# Patient Record
Sex: FEMALE | Race: WHITE | NOT HISPANIC OR LATINO
[De-identification: names, ages, dates, MRNs, and addresses within clinical notes are randomized per-mention and may not be internally consistent; named-entity substitution may affect disease eponyms.]

---

## 2017-02-15 ENCOUNTER — RESULT REVIEW (OUTPATIENT)
Age: 62
End: 2017-02-15

## 2017-02-28 ENCOUNTER — APPOINTMENT (OUTPATIENT)
Dept: ORTHOPEDIC SURGERY | Facility: CLINIC | Age: 62
End: 2017-02-28

## 2017-03-05 DIAGNOSIS — Z86.79 PERSONAL HISTORY OF OTHER DISEASES OF THE CIRCULATORY SYSTEM: ICD-10-CM

## 2017-03-05 DIAGNOSIS — I25.10 ATHEROSCLEROTIC HEART DISEASE OF NATIVE CORONARY ARTERY W/OUT ANGINA PECTORIS: ICD-10-CM

## 2017-03-08 ENCOUNTER — APPOINTMENT (OUTPATIENT)
Dept: PULMONOLOGY | Facility: CLINIC | Age: 62
End: 2017-03-08

## 2017-03-08 VITALS — WEIGHT: 236 LBS | BODY MASS INDEX: 43.17 KG/M2

## 2017-03-08 VITALS
DIASTOLIC BLOOD PRESSURE: 78 MMHG | RESPIRATION RATE: 16 BRPM | HEART RATE: 86 BPM | OXYGEN SATURATION: 97 % | SYSTOLIC BLOOD PRESSURE: 112 MMHG

## 2017-03-09 ENCOUNTER — APPOINTMENT (OUTPATIENT)
Dept: BARIATRICS/WEIGHT MGMT | Facility: CLINIC | Age: 62
End: 2017-03-09

## 2017-03-09 VITALS — WEIGHT: 236.8 LBS | BODY MASS INDEX: 43.31 KG/M2

## 2017-03-21 ENCOUNTER — CLINICAL ADVICE (OUTPATIENT)
Age: 62
End: 2017-03-21

## 2017-03-21 ENCOUNTER — APPOINTMENT (OUTPATIENT)
Dept: BARIATRICS/WEIGHT MGMT | Facility: CLINIC | Age: 62
End: 2017-03-21

## 2017-04-03 ENCOUNTER — APPOINTMENT (OUTPATIENT)
Dept: ORTHOPEDIC SURGERY | Facility: CLINIC | Age: 62
End: 2017-04-03

## 2017-04-04 ENCOUNTER — OUTPATIENT (OUTPATIENT)
Dept: OUTPATIENT SERVICES | Facility: HOSPITAL | Age: 62
LOS: 1 days | End: 2017-04-04
Payer: MEDICARE

## 2017-04-04 DIAGNOSIS — K43.9 VENTRAL HERNIA WITHOUT OBSTRUCTION OR GANGRENE: Chronic | ICD-10-CM

## 2017-04-04 DIAGNOSIS — G47.33 OBSTRUCTIVE SLEEP APNEA (ADULT) (PEDIATRIC): ICD-10-CM

## 2017-04-04 PROCEDURE — 95810 POLYSOM 6/> YRS 4/> PARAM: CPT | Mod: 26

## 2017-04-04 PROCEDURE — 95810 POLYSOM 6/> YRS 4/> PARAM: CPT

## 2017-04-10 ENCOUNTER — OUTPATIENT (OUTPATIENT)
Dept: OUTPATIENT SERVICES | Facility: HOSPITAL | Age: 62
LOS: 1 days | End: 2017-04-10
Payer: MEDICARE

## 2017-04-10 VITALS
HEART RATE: 80 BPM | TEMPERATURE: 99 F | DIASTOLIC BLOOD PRESSURE: 66 MMHG | RESPIRATION RATE: 16 BRPM | SYSTOLIC BLOOD PRESSURE: 128 MMHG | HEIGHT: 63 IN | WEIGHT: 240.08 LBS

## 2017-04-10 DIAGNOSIS — K43.9 VENTRAL HERNIA WITHOUT OBSTRUCTION OR GANGRENE: Chronic | ICD-10-CM

## 2017-04-10 DIAGNOSIS — Z01.818 ENCOUNTER FOR OTHER PREPROCEDURAL EXAMINATION: ICD-10-CM

## 2017-04-10 DIAGNOSIS — Z90.81 ACQUIRED ABSENCE OF SPLEEN: Chronic | ICD-10-CM

## 2017-04-10 DIAGNOSIS — I10 ESSENTIAL (PRIMARY) HYPERTENSION: ICD-10-CM

## 2017-04-10 DIAGNOSIS — Z87.09 PERSONAL HISTORY OF OTHER DISEASES OF THE RESPIRATORY SYSTEM: Chronic | ICD-10-CM

## 2017-04-10 DIAGNOSIS — N84.0 POLYP OF CORPUS UTERI: ICD-10-CM

## 2017-04-10 DIAGNOSIS — Z96.659 PRESENCE OF UNSPECIFIED ARTIFICIAL KNEE JOINT: Chronic | ICD-10-CM

## 2017-04-10 DIAGNOSIS — I48.91 UNSPECIFIED ATRIAL FIBRILLATION: ICD-10-CM

## 2017-04-10 LAB
ANION GAP SERPL CALC-SCNC: 13 MMOL/L — SIGNIFICANT CHANGE UP (ref 5–17)
ANISOCYTOSIS BLD QL: SLIGHT — SIGNIFICANT CHANGE UP
APTT BLD: 33.3 SEC — SIGNIFICANT CHANGE UP (ref 27.5–37.4)
BASOPHILS # BLD AUTO: 0 K/UL — SIGNIFICANT CHANGE UP (ref 0–0.2)
BASOPHILS NFR BLD AUTO: 1 % — SIGNIFICANT CHANGE UP (ref 0–2)
BUN SERPL-MCNC: 16 MG/DL — SIGNIFICANT CHANGE UP (ref 8–20)
BURR CELLS BLD QL SMEAR: PRESENT — SIGNIFICANT CHANGE UP
CALCIUM SERPL-MCNC: 10.1 MG/DL — SIGNIFICANT CHANGE UP (ref 8.6–10.2)
CHLORIDE SERPL-SCNC: 102 MMOL/L — SIGNIFICANT CHANGE UP (ref 98–107)
CO2 SERPL-SCNC: 26 MMOL/L — SIGNIFICANT CHANGE UP (ref 22–29)
CREAT SERPL-MCNC: 0.51 MG/DL — SIGNIFICANT CHANGE UP (ref 0.5–1.3)
EOSINOPHIL # BLD AUTO: 0.2 K/UL — SIGNIFICANT CHANGE UP (ref 0–0.5)
EOSINOPHIL NFR BLD AUTO: 0 % — SIGNIFICANT CHANGE UP (ref 0–6)
GLUCOSE SERPL-MCNC: 117 MG/DL — HIGH (ref 70–115)
HCT VFR BLD CALC: 41.4 % — SIGNIFICANT CHANGE UP (ref 37–47)
HGB BLD-MCNC: 13.5 G/DL — SIGNIFICANT CHANGE UP (ref 12–16)
HYPERCHROMIA BLD QL AUTO: SLIGHT — SIGNIFICANT CHANGE UP
INR BLD: 1.21 RATIO — HIGH (ref 0.88–1.16)
LYMPHOCYTES # BLD AUTO: 25 % — SIGNIFICANT CHANGE UP (ref 20–55)
LYMPHOCYTES # BLD AUTO: 3.2 K/UL — SIGNIFICANT CHANGE UP (ref 1–4.8)
MACROCYTES BLD QL: SLIGHT — SIGNIFICANT CHANGE UP
MCHC RBC-ENTMCNC: 28 PG — SIGNIFICANT CHANGE UP (ref 27–31)
MCHC RBC-ENTMCNC: 32.6 G/DL — SIGNIFICANT CHANGE UP (ref 32–36)
MCV RBC AUTO: 85.7 FL — SIGNIFICANT CHANGE UP (ref 81–99)
MICROCYTES BLD QL: SLIGHT — SIGNIFICANT CHANGE UP
MONOCYTES # BLD AUTO: 1.4 K/UL — HIGH (ref 0–0.8)
MONOCYTES NFR BLD AUTO: 10 % — SIGNIFICANT CHANGE UP (ref 3–10)
NEUTROPHILS # BLD AUTO: 8.6 K/UL — HIGH (ref 1.8–8)
NEUTROPHILS NFR BLD AUTO: 63 % — SIGNIFICANT CHANGE UP (ref 37–73)
PLAT MORPH BLD: NORMAL — SIGNIFICANT CHANGE UP
PLATELET # BLD AUTO: 441 K/UL — HIGH (ref 150–400)
POIKILOCYTOSIS BLD QL AUTO: SLIGHT — SIGNIFICANT CHANGE UP
POTASSIUM SERPL-MCNC: 4 MMOL/L — SIGNIFICANT CHANGE UP (ref 3.5–5.3)
POTASSIUM SERPL-SCNC: 4 MMOL/L — SIGNIFICANT CHANGE UP (ref 3.5–5.3)
PROTHROM AB SERPL-ACNC: 13.4 SEC — HIGH (ref 9.8–12.7)
RBC # BLD: 4.83 M/UL — SIGNIFICANT CHANGE UP (ref 4.4–5.2)
RBC # FLD: 15.4 % — SIGNIFICANT CHANGE UP (ref 11–15.6)
RBC BLD AUTO: ABNORMAL
SODIUM SERPL-SCNC: 141 MMOL/L — SIGNIFICANT CHANGE UP (ref 135–145)
SPHEROCYTES BLD QL SMEAR: SLIGHT — SIGNIFICANT CHANGE UP
TARGETS BLD QL SMEAR: SLIGHT — SIGNIFICANT CHANGE UP
VARIANT LYMPHS # BLD: 1 % — SIGNIFICANT CHANGE UP (ref 0–6)
WBC # BLD: 13.8 K/UL — HIGH (ref 4.8–10.8)
WBC # FLD AUTO: 13.8 K/UL — HIGH (ref 4.8–10.8)

## 2017-04-10 PROCEDURE — 86803 HEPATITIS C AB TEST: CPT

## 2017-04-10 PROCEDURE — 85730 THROMBOPLASTIN TIME PARTIAL: CPT

## 2017-04-10 PROCEDURE — 85610 PROTHROMBIN TIME: CPT

## 2017-04-10 PROCEDURE — G0463: CPT

## 2017-04-10 PROCEDURE — 80048 BASIC METABOLIC PNL TOTAL CA: CPT

## 2017-04-10 PROCEDURE — 85027 COMPLETE CBC AUTOMATED: CPT

## 2017-04-10 NOTE — H&P PST ADULT - ENDOCRINE
"2/20/17 Patient calls complaining of \"wet cough and runny nose\" for 2-3 days. Denies fever/body aches/headache/dyspnea/wheezing. No chronic lung disease.   He is concerned with possibility for passing this on to his wife. She is ill with leukemia and he is primary caregiver.   States whenever gets a cold, it always goes into his chest and needs antibiotics. Requesting antibiotics now to prevent spreading to his wife.   Discussed if viral, which is most likely at this point in his illness, antibiotics will not prevent contagiousness. Discussed general hygiene precautions to take to prevent spread of his illness. Patient agrees, but is very concerned about his wife's health and would like antibiotics and asks rn to check with Dr. Cornelius. Did talk to Dr. Cornelius and he reiterated above but did agree to give zpack. Patient informed and rx sent to pharmacy.  Lisa Condon RN    "
negative

## 2017-04-10 NOTE — H&P PST ADULT - PMH
Atrial fibrillation    Chronic bronchitis    COPD (chronic obstructive pulmonary disease)    GERD (gastroesophageal reflux disease)    Hypertension    Mitral valve prolapse    Osteoarthritis    Sleep apnea

## 2017-04-11 ENCOUNTER — APPOINTMENT (OUTPATIENT)
Dept: BARIATRICS | Facility: CLINIC | Age: 62
End: 2017-04-11

## 2017-04-11 VITALS
SYSTOLIC BLOOD PRESSURE: 110 MMHG | DIASTOLIC BLOOD PRESSURE: 90 MMHG | HEIGHT: 62 IN | BODY MASS INDEX: 43.82 KG/M2 | WEIGHT: 238.1 LBS

## 2017-04-11 DIAGNOSIS — N84.0 POLYP OF CORPUS UTERI: ICD-10-CM

## 2017-04-11 DIAGNOSIS — Z01.818 ENCOUNTER FOR OTHER PREPROCEDURAL EXAMINATION: ICD-10-CM

## 2017-04-12 LAB
HCV AB S/CO SERPL IA: 0.1 S/CO — SIGNIFICANT CHANGE UP
HCV AB SERPL-IMP: SIGNIFICANT CHANGE UP

## 2017-04-23 ENCOUNTER — RESULT REVIEW (OUTPATIENT)
Age: 62
End: 2017-04-23

## 2017-04-24 ENCOUNTER — APPOINTMENT (OUTPATIENT)
Dept: BARIATRICS/WEIGHT MGMT | Facility: CLINIC | Age: 62
End: 2017-04-24

## 2017-04-24 ENCOUNTER — OUTPATIENT (OUTPATIENT)
Dept: OUTPATIENT SERVICES | Facility: HOSPITAL | Age: 62
LOS: 1 days | End: 2017-04-24
Payer: MEDICARE

## 2017-04-24 VITALS
DIASTOLIC BLOOD PRESSURE: 81 MMHG | HEART RATE: 84 BPM | WEIGHT: 240.08 LBS | OXYGEN SATURATION: 99 % | RESPIRATION RATE: 16 BRPM | TEMPERATURE: 97 F | HEIGHT: 63 IN | SYSTOLIC BLOOD PRESSURE: 148 MMHG

## 2017-04-24 VITALS
HEART RATE: 94 BPM | SYSTOLIC BLOOD PRESSURE: 136 MMHG | RESPIRATION RATE: 12 BRPM | DIASTOLIC BLOOD PRESSURE: 84 MMHG | TEMPERATURE: 97 F | OXYGEN SATURATION: 97 %

## 2017-04-24 DIAGNOSIS — Z96.659 PRESENCE OF UNSPECIFIED ARTIFICIAL KNEE JOINT: Chronic | ICD-10-CM

## 2017-04-24 DIAGNOSIS — K43.9 VENTRAL HERNIA WITHOUT OBSTRUCTION OR GANGRENE: Chronic | ICD-10-CM

## 2017-04-24 DIAGNOSIS — Z87.09 PERSONAL HISTORY OF OTHER DISEASES OF THE RESPIRATORY SYSTEM: Chronic | ICD-10-CM

## 2017-04-24 DIAGNOSIS — N84.0 POLYP OF CORPUS UTERI: ICD-10-CM

## 2017-04-24 DIAGNOSIS — Z90.81 ACQUIRED ABSENCE OF SPLEEN: Chronic | ICD-10-CM

## 2017-04-24 LAB
INR BLD: 1.07 RATIO — SIGNIFICANT CHANGE UP (ref 0.88–1.16)
PROTHROM AB SERPL-ACNC: 11.8 SEC — SIGNIFICANT CHANGE UP (ref 9.8–12.7)

## 2017-04-24 PROCEDURE — 58120 DILATION AND CURETTAGE: CPT

## 2017-04-24 PROCEDURE — 85610 PROTHROMBIN TIME: CPT

## 2017-04-24 PROCEDURE — 88305 TISSUE EXAM BY PATHOLOGIST: CPT

## 2017-04-24 PROCEDURE — 88305 TISSUE EXAM BY PATHOLOGIST: CPT | Mod: 26

## 2017-04-24 RX ORDER — ONDANSETRON 8 MG/1
4 TABLET, FILM COATED ORAL ONCE
Qty: 0 | Refills: 0 | Status: DISCONTINUED | OUTPATIENT
Start: 2017-04-24 | End: 2017-04-24

## 2017-04-24 RX ORDER — SODIUM CHLORIDE 9 MG/ML
3 INJECTION INTRAMUSCULAR; INTRAVENOUS; SUBCUTANEOUS ONCE
Qty: 0 | Refills: 0 | Status: COMPLETED | OUTPATIENT
Start: 2017-04-24 | End: 2017-04-24

## 2017-04-24 RX ORDER — SODIUM CHLORIDE 9 MG/ML
1000 INJECTION, SOLUTION INTRAVENOUS
Qty: 0 | Refills: 0 | Status: DISCONTINUED | OUTPATIENT
Start: 2017-04-24 | End: 2017-04-24

## 2017-04-24 RX ORDER — FENTANYL CITRATE 50 UG/ML
50 INJECTION INTRAVENOUS
Qty: 0 | Refills: 0 | Status: DISCONTINUED | OUTPATIENT
Start: 2017-04-24 | End: 2017-04-24

## 2017-04-24 RX ADMIN — FENTANYL CITRATE 50 MICROGRAM(S): 50 INJECTION INTRAVENOUS at 13:55

## 2017-04-24 RX ADMIN — FENTANYL CITRATE 50 MICROGRAM(S): 50 INJECTION INTRAVENOUS at 13:20

## 2017-04-24 RX ADMIN — FENTANYL CITRATE 50 MICROGRAM(S): 50 INJECTION INTRAVENOUS at 14:50

## 2017-04-24 RX ADMIN — FENTANYL CITRATE 50 MICROGRAM(S): 50 INJECTION INTRAVENOUS at 14:10

## 2017-04-24 RX ADMIN — SODIUM CHLORIDE 3 MILLILITER(S): 9 INJECTION INTRAMUSCULAR; INTRAVENOUS; SUBCUTANEOUS at 13:19

## 2017-04-24 RX ADMIN — FENTANYL CITRATE 50 MICROGRAM(S): 50 INJECTION INTRAVENOUS at 13:35

## 2017-04-24 NOTE — BRIEF OPERATIVE NOTE - PRE-OP DX
Obesity, unspecified obesity severity, unspecified obesity type  04/24/2017    Active  Any Collado  PMB (postmenopausal bleeding)  04/24/2017    Active  Any Collado

## 2017-04-24 NOTE — ASU DISCHARGE PLAN (ADULT/PEDIATRIC). - MEDICATION SUMMARY - MEDICATIONS TO TAKE
I will START or STAY ON the medications listed below when I get home from the hospital:    lisinopril 20 mg oral tablet  -- 1 tab(s) by mouth once a day  -- Indication: For as per MD    warfarin 5 mg oral tablet  -- 1 tab(s) by mouth once a day  -- Indication: For as per MD    Neurontin  --  by mouth once a day, As Needed  -- Indication: For as per MD    atorvastatin  --  by mouth once a day (at bedtime)  -- Indication: For as per MD    metoprolol succinate 200 mg oral tablet, extended release  -- 1 tab(s) by mouth once a day  -- Indication: For as per MD    Protonix 40 mg oral delayed release tablet  -- 1 tab(s) by mouth once a day  -- Indication: For as per MD

## 2017-04-26 LAB — SURGICAL PATHOLOGY FINAL REPORT - CH: SIGNIFICANT CHANGE UP

## 2017-04-27 ENCOUNTER — APPOINTMENT (OUTPATIENT)
Dept: PULMONOLOGY | Facility: CLINIC | Age: 62
End: 2017-04-27

## 2017-04-27 VITALS
SYSTOLIC BLOOD PRESSURE: 120 MMHG | DIASTOLIC BLOOD PRESSURE: 82 MMHG | WEIGHT: 242 LBS | HEART RATE: 80 BPM | BODY MASS INDEX: 44.26 KG/M2 | OXYGEN SATURATION: 95 %

## 2017-04-27 RX ORDER — ATORVASTATIN CALCIUM 80 MG/1
TABLET, FILM COATED ORAL
Refills: 0 | Status: DISCONTINUED | COMMUNITY
End: 2017-04-27

## 2017-04-27 RX ORDER — PANTOPRAZOLE 40 MG/1
40 TABLET, DELAYED RELEASE ORAL
Refills: 0 | Status: ACTIVE | COMMUNITY

## 2017-05-01 ENCOUNTER — TRANSCRIPTION ENCOUNTER (OUTPATIENT)
Age: 62
End: 2017-05-01

## 2017-05-04 ENCOUNTER — APPOINTMENT (OUTPATIENT)
Dept: BARIATRICS/WEIGHT MGMT | Facility: CLINIC | Age: 62
End: 2017-05-04

## 2017-05-04 ENCOUNTER — APPOINTMENT (OUTPATIENT)
Dept: BARIATRICS | Facility: CLINIC | Age: 62
End: 2017-05-04

## 2017-05-04 VITALS
BODY MASS INDEX: 44.53 KG/M2 | DIASTOLIC BLOOD PRESSURE: 76 MMHG | WEIGHT: 242 LBS | SYSTOLIC BLOOD PRESSURE: 112 MMHG | HEIGHT: 62 IN

## 2017-05-17 ENCOUNTER — OTHER (OUTPATIENT)
Age: 62
End: 2017-05-17

## 2017-05-22 ENCOUNTER — APPOINTMENT (OUTPATIENT)
Dept: BARIATRICS/WEIGHT MGMT | Facility: CLINIC | Age: 62
End: 2017-05-22

## 2017-05-22 VITALS — BODY MASS INDEX: 45.28 KG/M2 | WEIGHT: 247.58 LBS

## 2017-05-22 DIAGNOSIS — Z87.891 PERSONAL HISTORY OF NICOTINE DEPENDENCE: ICD-10-CM

## 2017-05-24 ENCOUNTER — APPOINTMENT (OUTPATIENT)
Dept: PULMONOLOGY | Facility: CLINIC | Age: 62
End: 2017-05-24

## 2017-06-30 ENCOUNTER — APPOINTMENT (OUTPATIENT)
Dept: BARIATRICS/WEIGHT MGMT | Facility: CLINIC | Age: 62
End: 2017-06-30

## 2017-06-30 VITALS — HEIGHT: 62 IN | WEIGHT: 245 LBS | BODY MASS INDEX: 45.08 KG/M2

## 2017-07-05 ENCOUNTER — APPOINTMENT (OUTPATIENT)
Dept: PULMONOLOGY | Facility: CLINIC | Age: 62
End: 2017-07-05

## 2017-07-05 VITALS — HEART RATE: 63 BPM | DIASTOLIC BLOOD PRESSURE: 70 MMHG | OXYGEN SATURATION: 96 % | SYSTOLIC BLOOD PRESSURE: 120 MMHG

## 2017-07-05 DIAGNOSIS — J44.9 CHRONIC OBSTRUCTIVE PULMONARY DISEASE, UNSPECIFIED: ICD-10-CM

## 2017-07-05 DIAGNOSIS — M79.7 FIBROMYALGIA: ICD-10-CM

## 2017-07-05 DIAGNOSIS — Z01.818 ENCOUNTER FOR OTHER PREPROCEDURAL EXAMINATION: ICD-10-CM

## 2017-07-05 RX ORDER — AMOXICILLIN 250 MG/1
250 CAPSULE ORAL
Qty: 30 | Refills: 0 | Status: DISCONTINUED | COMMUNITY
Start: 2017-04-05 | End: 2017-07-05

## 2017-07-05 RX ORDER — TRAMADOL HYDROCHLORIDE 50 MG/1
50 TABLET, COATED ORAL
Qty: 30 | Refills: 0 | Status: DISCONTINUED | COMMUNITY
Start: 2017-06-05 | End: 2017-07-05

## 2017-07-05 RX ORDER — DICLOFENAC SODIUM 10 MG/G
1 GEL TOPICAL
Qty: 100 | Refills: 0 | Status: DISCONTINUED | COMMUNITY
Start: 2017-02-02 | End: 2017-07-05

## 2017-07-05 RX ORDER — LORAZEPAM 0.5 MG/1
0.5 TABLET ORAL
Qty: 60 | Refills: 0 | Status: DISCONTINUED | COMMUNITY
Start: 2017-06-26 | End: 2017-07-05

## 2017-07-05 RX ORDER — AMOXICILLIN 500 MG/1
500 CAPSULE ORAL
Qty: 21 | Refills: 0 | Status: DISCONTINUED | COMMUNITY
Start: 2017-06-06 | End: 2017-07-05

## 2017-07-05 RX ORDER — GABAPENTIN 300 MG/1
300 CAPSULE ORAL
Qty: 270 | Refills: 0 | Status: DISCONTINUED | COMMUNITY
Start: 2017-06-26 | End: 2017-07-05

## 2017-07-05 RX ORDER — NYSTATIN AND TRIAMCINOLONE ACETONIDE 100000; 1 MG/G; MG/G
100000-0.1 CREAM TOPICAL
Qty: 30 | Refills: 0 | Status: DISCONTINUED | COMMUNITY
Start: 2017-03-24 | End: 2017-07-05

## 2017-07-05 RX ORDER — METOPROLOL SUCCINATE 200 MG/1
200 TABLET, EXTENDED RELEASE ORAL
Qty: 90 | Refills: 0 | Status: ACTIVE | COMMUNITY
Start: 2017-04-06

## 2017-07-05 RX ORDER — ACETAMINOPHEN AND CODEINE 300; 30 MG/1; MG/1
300-30 TABLET ORAL
Qty: 16 | Refills: 0 | Status: DISCONTINUED | COMMUNITY
Start: 2017-06-06 | End: 2017-07-05

## 2017-07-21 ENCOUNTER — APPOINTMENT (OUTPATIENT)
Dept: ORTHOPEDIC SURGERY | Facility: CLINIC | Age: 62
End: 2017-07-21

## 2017-07-21 VITALS — HEIGHT: 62 IN | BODY MASS INDEX: 45.08 KG/M2 | WEIGHT: 245 LBS

## 2017-07-21 DIAGNOSIS — M17.11 UNILATERAL PRIMARY OSTEOARTHRITIS, RIGHT KNEE: ICD-10-CM

## 2017-07-21 DIAGNOSIS — M17.12 UNILATERAL PRIMARY OSTEOARTHRITIS, LEFT KNEE: ICD-10-CM

## 2017-08-08 ENCOUNTER — APPOINTMENT (OUTPATIENT)
Dept: BARIATRICS/WEIGHT MGMT | Facility: CLINIC | Age: 62
End: 2017-08-08
Payer: MEDICARE

## 2017-08-08 ENCOUNTER — APPOINTMENT (OUTPATIENT)
Dept: BARIATRICS | Facility: CLINIC | Age: 62
End: 2017-08-08
Payer: MEDICARE

## 2017-08-08 VITALS
SYSTOLIC BLOOD PRESSURE: 110 MMHG | HEIGHT: 62 IN | WEIGHT: 236.89 LBS | DIASTOLIC BLOOD PRESSURE: 70 MMHG | BODY MASS INDEX: 43.59 KG/M2

## 2017-08-08 VITALS — HEIGHT: 62 IN | WEIGHT: 236 LBS | BODY MASS INDEX: 43.43 KG/M2

## 2017-08-08 DIAGNOSIS — E66.01 MORBID (SEVERE) OBESITY DUE TO EXCESS CALORIES: ICD-10-CM

## 2017-08-08 DIAGNOSIS — Z98.84 BARIATRIC SURGERY STATUS: ICD-10-CM

## 2017-08-08 PROCEDURE — 97803 MED NUTRITION INDIV SUBSEQ: CPT

## 2017-08-08 PROCEDURE — 99214 OFFICE O/P EST MOD 30 MIN: CPT

## 2017-08-14 ENCOUNTER — APPOINTMENT (OUTPATIENT)
Dept: PHYSICAL MEDICINE AND REHAB | Facility: CLINIC | Age: 62
End: 2017-08-14

## 2017-09-27 ENCOUNTER — APPOINTMENT (OUTPATIENT)
Dept: BARIATRICS/WEIGHT MGMT | Facility: CLINIC | Age: 62
End: 2017-09-27

## 2017-10-05 ENCOUNTER — APPOINTMENT (OUTPATIENT)
Dept: PHYSICAL MEDICINE AND REHAB | Facility: CLINIC | Age: 62
End: 2017-10-05
Payer: MEDICARE

## 2017-10-05 VITALS
DIASTOLIC BLOOD PRESSURE: 79 MMHG | HEART RATE: 76 BPM | WEIGHT: 225 LBS | BODY MASS INDEX: 41.41 KG/M2 | HEIGHT: 62 IN | SYSTOLIC BLOOD PRESSURE: 107 MMHG

## 2017-10-05 DIAGNOSIS — M25.569 PAIN IN UNSPECIFIED KNEE: ICD-10-CM

## 2017-10-05 PROCEDURE — 99204 OFFICE O/P NEW MOD 45 MIN: CPT

## 2017-10-10 ENCOUNTER — APPOINTMENT (OUTPATIENT)
Dept: PULMONOLOGY | Facility: CLINIC | Age: 62
End: 2017-10-10

## 2017-11-01 ENCOUNTER — OUTPATIENT (OUTPATIENT)
Dept: OUTPATIENT SERVICES | Facility: HOSPITAL | Age: 62
LOS: 1 days | End: 2017-11-01
Payer: MEDICARE

## 2017-11-01 DIAGNOSIS — Z51.89 ENCOUNTER FOR OTHER SPECIFIED AFTERCARE: ICD-10-CM

## 2017-11-01 DIAGNOSIS — M25.569 PAIN IN UNSPECIFIED KNEE: ICD-10-CM

## 2017-11-01 DIAGNOSIS — Z90.81 ACQUIRED ABSENCE OF SPLEEN: Chronic | ICD-10-CM

## 2017-11-01 DIAGNOSIS — Z96.659 PRESENCE OF UNSPECIFIED ARTIFICIAL KNEE JOINT: Chronic | ICD-10-CM

## 2017-11-01 DIAGNOSIS — Z87.09 PERSONAL HISTORY OF OTHER DISEASES OF THE RESPIRATORY SYSTEM: Chronic | ICD-10-CM

## 2017-11-01 DIAGNOSIS — K43.9 VENTRAL HERNIA WITHOUT OBSTRUCTION OR GANGRENE: Chronic | ICD-10-CM

## 2017-11-13 DIAGNOSIS — M25.561 PAIN IN RIGHT KNEE: ICD-10-CM

## 2017-11-13 DIAGNOSIS — M25.562 PAIN IN LEFT KNEE: ICD-10-CM

## 2017-12-08 PROCEDURE — G8979: CPT | Mod: CL

## 2017-12-08 PROCEDURE — 97010 HOT OR COLD PACKS THERAPY: CPT

## 2017-12-08 PROCEDURE — 97116 GAIT TRAINING THERAPY: CPT

## 2017-12-08 PROCEDURE — 97110 THERAPEUTIC EXERCISES: CPT

## 2017-12-08 PROCEDURE — G8978: CPT | Mod: CL

## 2017-12-08 PROCEDURE — 97163 PT EVAL HIGH COMPLEX 45 MIN: CPT

## 2018-01-01 ENCOUNTER — OUTPATIENT (OUTPATIENT)
Dept: OUTPATIENT SERVICES | Facility: HOSPITAL | Age: 63
LOS: 1 days | End: 2018-01-01
Payer: MEDICAID

## 2018-01-01 DIAGNOSIS — K43.9 VENTRAL HERNIA WITHOUT OBSTRUCTION OR GANGRENE: Chronic | ICD-10-CM

## 2018-01-01 DIAGNOSIS — Z90.81 ACQUIRED ABSENCE OF SPLEEN: Chronic | ICD-10-CM

## 2018-01-01 DIAGNOSIS — Z96.659 PRESENCE OF UNSPECIFIED ARTIFICIAL KNEE JOINT: Chronic | ICD-10-CM

## 2018-01-01 DIAGNOSIS — Z87.09 PERSONAL HISTORY OF OTHER DISEASES OF THE RESPIRATORY SYSTEM: Chronic | ICD-10-CM

## 2018-01-01 PROCEDURE — G9001: CPT

## 2018-01-19 ENCOUNTER — EMERGENCY (EMERGENCY)
Facility: HOSPITAL | Age: 63
LOS: 1 days | Discharge: HOME | End: 2018-01-19
Attending: EMERGENCY MEDICINE | Admitting: EMERGENCY MEDICINE
Payer: MEDICARE

## 2018-01-19 VITALS
DIASTOLIC BLOOD PRESSURE: 80 MMHG | HEART RATE: 88 BPM | RESPIRATION RATE: 20 BRPM | SYSTOLIC BLOOD PRESSURE: 122 MMHG | OXYGEN SATURATION: 96 % | TEMPERATURE: 98 F

## 2018-01-19 VITALS
WEIGHT: 220.02 LBS | OXYGEN SATURATION: 94 % | RESPIRATION RATE: 18 BRPM | SYSTOLIC BLOOD PRESSURE: 120 MMHG | TEMPERATURE: 100 F | HEIGHT: 63 IN | HEART RATE: 99 BPM | DIASTOLIC BLOOD PRESSURE: 86 MMHG

## 2018-01-19 DIAGNOSIS — Z96.659 PRESENCE OF UNSPECIFIED ARTIFICIAL KNEE JOINT: Chronic | ICD-10-CM

## 2018-01-19 DIAGNOSIS — K43.9 VENTRAL HERNIA WITHOUT OBSTRUCTION OR GANGRENE: Chronic | ICD-10-CM

## 2018-01-19 DIAGNOSIS — Z90.81 ACQUIRED ABSENCE OF SPLEEN: Chronic | ICD-10-CM

## 2018-01-19 DIAGNOSIS — Z87.09 PERSONAL HISTORY OF OTHER DISEASES OF THE RESPIRATORY SYSTEM: Chronic | ICD-10-CM

## 2018-01-19 LAB
ALBUMIN SERPL ELPH-MCNC: 3.9 G/DL — SIGNIFICANT CHANGE UP (ref 3.3–5.2)
ALP SERPL-CCNC: 122 U/L — HIGH (ref 40–120)
ALT FLD-CCNC: 26 U/L — SIGNIFICANT CHANGE UP
ANION GAP SERPL CALC-SCNC: 17 MMOL/L — SIGNIFICANT CHANGE UP (ref 5–17)
AST SERPL-CCNC: 24 U/L — SIGNIFICANT CHANGE UP
BASOPHILS # BLD AUTO: 0 K/UL — SIGNIFICANT CHANGE UP (ref 0–0.2)
BASOPHILS NFR BLD AUTO: 0.3 % — SIGNIFICANT CHANGE UP (ref 0–2)
BILIRUB SERPL-MCNC: 0.2 MG/DL — LOW (ref 0.4–2)
BUN SERPL-MCNC: 10 MG/DL — SIGNIFICANT CHANGE UP (ref 8–20)
CALCIUM SERPL-MCNC: 9.2 MG/DL — SIGNIFICANT CHANGE UP (ref 8.6–10.2)
CHLORIDE SERPL-SCNC: 99 MMOL/L — SIGNIFICANT CHANGE UP (ref 98–107)
CO2 SERPL-SCNC: 25 MMOL/L — SIGNIFICANT CHANGE UP (ref 22–29)
CREAT SERPL-MCNC: 0.59 MG/DL — SIGNIFICANT CHANGE UP (ref 0.5–1.3)
EOSINOPHIL # BLD AUTO: 0.1 K/UL — SIGNIFICANT CHANGE UP (ref 0–0.5)
EOSINOPHIL NFR BLD AUTO: 1.2 % — SIGNIFICANT CHANGE UP (ref 0–6)
GLUCOSE SERPL-MCNC: 98 MG/DL — SIGNIFICANT CHANGE UP (ref 70–115)
HCT VFR BLD CALC: 39.4 % — SIGNIFICANT CHANGE UP (ref 37–47)
HGB BLD-MCNC: 12.9 G/DL — SIGNIFICANT CHANGE UP (ref 12–16)
INR BLD: 3.18 RATIO — HIGH (ref 0.88–1.16)
LYMPHOCYTES # BLD AUTO: 1.5 K/UL — SIGNIFICANT CHANGE UP (ref 1–4.8)
LYMPHOCYTES # BLD AUTO: 17 % — LOW (ref 20–55)
MCHC RBC-ENTMCNC: 28.3 PG — SIGNIFICANT CHANGE UP (ref 27–31)
MCHC RBC-ENTMCNC: 32.7 G/DL — SIGNIFICANT CHANGE UP (ref 32–36)
MCV RBC AUTO: 86.4 FL — SIGNIFICANT CHANGE UP (ref 81–99)
MONOCYTES # BLD AUTO: 1.4 K/UL — HIGH (ref 0–0.8)
MONOCYTES NFR BLD AUTO: 16.6 % — HIGH (ref 3–10)
NEUTROPHILS # BLD AUTO: 5.6 K/UL — SIGNIFICANT CHANGE UP (ref 1.8–8)
NEUTROPHILS NFR BLD AUTO: 64.7 % — SIGNIFICANT CHANGE UP (ref 37–73)
PLATELET # BLD AUTO: 388 K/UL — SIGNIFICANT CHANGE UP (ref 150–400)
POTASSIUM SERPL-MCNC: 4.3 MMOL/L — SIGNIFICANT CHANGE UP (ref 3.5–5.3)
POTASSIUM SERPL-SCNC: 4.3 MMOL/L — SIGNIFICANT CHANGE UP (ref 3.5–5.3)
PROT SERPL-MCNC: 7.4 G/DL — SIGNIFICANT CHANGE UP (ref 6.6–8.7)
PROTHROM AB SERPL-ACNC: 35.8 SEC — HIGH (ref 9.8–12.7)
RBC # BLD: 4.56 M/UL — SIGNIFICANT CHANGE UP (ref 4.4–5.2)
RBC # FLD: 15 % — SIGNIFICANT CHANGE UP (ref 11–15.6)
SODIUM SERPL-SCNC: 141 MMOL/L — SIGNIFICANT CHANGE UP (ref 135–145)
TROPONIN T SERPL-MCNC: <0.01 NG/ML — SIGNIFICANT CHANGE UP (ref 0–0.06)
WBC # BLD: 8.6 K/UL — SIGNIFICANT CHANGE UP (ref 4.8–10.8)
WBC # FLD AUTO: 8.6 K/UL — SIGNIFICANT CHANGE UP (ref 4.8–10.8)

## 2018-01-19 PROCEDURE — 83605 ASSAY OF LACTIC ACID: CPT

## 2018-01-19 PROCEDURE — 36415 COLL VENOUS BLD VENIPUNCTURE: CPT

## 2018-01-19 PROCEDURE — 84484 ASSAY OF TROPONIN QUANT: CPT

## 2018-01-19 PROCEDURE — 96375 TX/PRO/DX INJ NEW DRUG ADDON: CPT

## 2018-01-19 PROCEDURE — 71046 X-RAY EXAM CHEST 2 VIEWS: CPT

## 2018-01-19 PROCEDURE — 99284 EMERGENCY DEPT VISIT MOD MDM: CPT

## 2018-01-19 PROCEDURE — 99285 EMERGENCY DEPT VISIT HI MDM: CPT

## 2018-01-19 PROCEDURE — 96374 THER/PROPH/DIAG INJ IV PUSH: CPT

## 2018-01-19 PROCEDURE — 71046 X-RAY EXAM CHEST 2 VIEWS: CPT | Mod: 26

## 2018-01-19 PROCEDURE — 85027 COMPLETE CBC AUTOMATED: CPT

## 2018-01-19 PROCEDURE — 99284 EMERGENCY DEPT VISIT MOD MDM: CPT | Mod: 25

## 2018-01-19 PROCEDURE — 94640 AIRWAY INHALATION TREATMENT: CPT

## 2018-01-19 PROCEDURE — 85610 PROTHROMBIN TIME: CPT

## 2018-01-19 PROCEDURE — 80053 COMPREHEN METABOLIC PANEL: CPT

## 2018-01-19 RX ORDER — IPRATROPIUM/ALBUTEROL SULFATE 18-103MCG
3 AEROSOL WITH ADAPTER (GRAM) INHALATION ONCE
Qty: 0 | Refills: 0 | Status: COMPLETED | OUTPATIENT
Start: 2018-01-19 | End: 2018-01-19

## 2018-01-19 RX ORDER — AZITHROMYCIN 500 MG/1
500 TABLET, FILM COATED ORAL ONCE
Qty: 0 | Refills: 0 | Status: COMPLETED | OUTPATIENT
Start: 2018-01-19 | End: 2018-01-19

## 2018-01-19 RX ORDER — ALBUTEROL 90 UG/1
3 AEROSOL, METERED ORAL
Qty: 360 | Refills: 0 | OUTPATIENT
Start: 2018-01-19 | End: 2018-02-17

## 2018-01-19 RX ORDER — AZITHROMYCIN 500 MG/1
1 TABLET, FILM COATED ORAL
Qty: 1 | Refills: 0 | OUTPATIENT
Start: 2018-01-19

## 2018-01-19 RX ORDER — IPRATROPIUM/ALBUTEROL SULFATE 18-103MCG
3 AEROSOL WITH ADAPTER (GRAM) INHALATION ONCE
Qty: 0 | Refills: 0 | Status: DISCONTINUED | OUTPATIENT
Start: 2018-01-19 | End: 2018-01-23

## 2018-01-19 RX ADMIN — Medication 3 MILLILITER(S): at 08:15

## 2018-01-19 RX ADMIN — Medication 125 MILLIGRAM(S): at 08:15

## 2018-01-19 RX ADMIN — Medication 3 MILLILITER(S): at 09:18

## 2018-01-19 RX ADMIN — AZITHROMYCIN 255 MILLIGRAM(S): 500 TABLET, FILM COATED ORAL at 08:15

## 2018-01-19 NOTE — ED PROVIDER NOTE - PROGRESS NOTE DETAILS
Benefits and adverse effects of tamiflu discussed with patient.  Patient expressed understanding and declined  Tamiflu citing adverse effects. Pt feeling much better and asking to go home. Seek immediate medical care for any new/worsening signs or symptoms. Use nebulizer every 4- 6 hours for cough/ trouble breathing. Take steroids daily for the next 5 days and azithromycin dose pack as directed. if you notice any worsening trouble breathing/ fevers/chills, or other concerning symptoms, return to ER immediately.  I reviewed patient's results with patient and allowed the opportunity for questions.  I provided the patient with anticipatory guidance, advised followup with PCP, and gave return precautions. Patient reported that they were feeling well for discharge and expressed understanding of instructions.  Patient is well for discharge.

## 2018-01-19 NOTE — ED PROVIDER NOTE - OBJECTIVE STATEMENT
62F with h/o HTN, CAD, Afib on coumadin, COPD (no home O2), presents with 1 day of cough (slightly productive of clear sputum) and SOB.  PT also c/o feverishness, rhinitis, body aches. Pt denies any recent hospitalizations, travel.  Reports she was around her sister several days ago who had the flu. Pt states she had inhalers but does not take them.

## 2018-01-19 NOTE — ED PROVIDER NOTE - MEDICAL DECISION MAKING DETAILS
Pt with COPD presents with 1 day of nonproductive cough/ SOB.  IN NAD>  Plan to treat symptomatically, check CXR to eval for pneumonia, reeval.

## 2018-01-19 NOTE — ED ADULT TRIAGE NOTE - CHIEF COMPLAINT QUOTE
patient arrives to ED awake and oriented times 3, complains of a cough and SOB today, patient denies any fevers or chills, arrives from home for eval

## 2018-01-22 DIAGNOSIS — R69 ILLNESS, UNSPECIFIED: ICD-10-CM

## 2018-05-01 ENCOUNTER — OUTPATIENT (OUTPATIENT)
Dept: OUTPATIENT SERVICES | Facility: HOSPITAL | Age: 63
LOS: 1 days | End: 2018-05-01
Payer: MEDICAID

## 2018-05-01 DIAGNOSIS — K43.9 VENTRAL HERNIA WITHOUT OBSTRUCTION OR GANGRENE: Chronic | ICD-10-CM

## 2018-05-01 DIAGNOSIS — Z90.81 ACQUIRED ABSENCE OF SPLEEN: Chronic | ICD-10-CM

## 2018-05-01 DIAGNOSIS — Z96.659 PRESENCE OF UNSPECIFIED ARTIFICIAL KNEE JOINT: Chronic | ICD-10-CM

## 2018-05-01 DIAGNOSIS — Z87.09 PERSONAL HISTORY OF OTHER DISEASES OF THE RESPIRATORY SYSTEM: Chronic | ICD-10-CM

## 2018-05-01 PROCEDURE — G9001: CPT

## 2018-05-03 DIAGNOSIS — R69 ILLNESS, UNSPECIFIED: ICD-10-CM

## 2018-05-17 ENCOUNTER — INPATIENT (INPATIENT)
Facility: HOSPITAL | Age: 63
LOS: 2 days | Discharge: HOME | DRG: 64 | End: 2018-05-20
Attending: HOSPITALIST | Admitting: HOSPITALIST
Payer: MEDICARE

## 2018-05-17 VITALS
SYSTOLIC BLOOD PRESSURE: 137 MMHG | OXYGEN SATURATION: 99 % | DIASTOLIC BLOOD PRESSURE: 100 MMHG | TEMPERATURE: 98 F | HEART RATE: 67 BPM | RESPIRATION RATE: 16 BRPM

## 2018-05-17 DIAGNOSIS — Z87.09 PERSONAL HISTORY OF OTHER DISEASES OF THE RESPIRATORY SYSTEM: Chronic | ICD-10-CM

## 2018-05-17 DIAGNOSIS — I63.9 CEREBRAL INFARCTION, UNSPECIFIED: ICD-10-CM

## 2018-05-17 DIAGNOSIS — Z90.81 ACQUIRED ABSENCE OF SPLEEN: Chronic | ICD-10-CM

## 2018-05-17 DIAGNOSIS — Z96.659 PRESENCE OF UNSPECIFIED ARTIFICIAL KNEE JOINT: Chronic | ICD-10-CM

## 2018-05-17 DIAGNOSIS — K43.9 VENTRAL HERNIA WITHOUT OBSTRUCTION OR GANGRENE: Chronic | ICD-10-CM

## 2018-05-17 LAB
ALBUMIN SERPL ELPH-MCNC: 3.9 G/DL — SIGNIFICANT CHANGE UP (ref 3.3–5.2)
ALP SERPL-CCNC: 109 U/L — SIGNIFICANT CHANGE UP (ref 40–120)
ALT FLD-CCNC: 15 U/L — SIGNIFICANT CHANGE UP
ANION GAP SERPL CALC-SCNC: 16 MMOL/L — SIGNIFICANT CHANGE UP (ref 5–17)
APTT BLD: 31.5 SEC — SIGNIFICANT CHANGE UP (ref 27.5–37.4)
AST SERPL-CCNC: 22 U/L — SIGNIFICANT CHANGE UP
BASOPHILS # BLD AUTO: 0 K/UL — SIGNIFICANT CHANGE UP (ref 0–0.2)
BASOPHILS NFR BLD AUTO: 0.2 % — SIGNIFICANT CHANGE UP (ref 0–2)
BILIRUB SERPL-MCNC: 0.3 MG/DL — LOW (ref 0.4–2)
BUN SERPL-MCNC: 11 MG/DL — SIGNIFICANT CHANGE UP (ref 8–20)
CALCIUM SERPL-MCNC: 10.5 MG/DL — HIGH (ref 8.6–10.2)
CHLORIDE SERPL-SCNC: 97 MMOL/L — LOW (ref 98–107)
CO2 SERPL-SCNC: 26 MMOL/L — SIGNIFICANT CHANGE UP (ref 22–29)
CREAT SERPL-MCNC: 0.59 MG/DL — SIGNIFICANT CHANGE UP (ref 0.5–1.3)
EOSINOPHIL # BLD AUTO: 0.1 K/UL — SIGNIFICANT CHANGE UP (ref 0–0.5)
EOSINOPHIL NFR BLD AUTO: 1.1 % — SIGNIFICANT CHANGE UP (ref 0–6)
GLUCOSE SERPL-MCNC: 102 MG/DL — SIGNIFICANT CHANGE UP (ref 70–115)
HCT VFR BLD CALC: 41.6 % — SIGNIFICANT CHANGE UP (ref 37–47)
HGB BLD-MCNC: 13.3 G/DL — SIGNIFICANT CHANGE UP (ref 12–16)
INR BLD: 1.06 RATIO — SIGNIFICANT CHANGE UP (ref 0.88–1.16)
LYMPHOCYTES # BLD AUTO: 26 % — SIGNIFICANT CHANGE UP (ref 20–55)
LYMPHOCYTES # BLD AUTO: 3.4 K/UL — SIGNIFICANT CHANGE UP (ref 1–4.8)
MCHC RBC-ENTMCNC: 29.1 PG — SIGNIFICANT CHANGE UP (ref 27–31)
MCHC RBC-ENTMCNC: 32 G/DL — SIGNIFICANT CHANGE UP (ref 32–36)
MCV RBC AUTO: 91 FL — SIGNIFICANT CHANGE UP (ref 81–99)
MONOCYTES # BLD AUTO: 1.4 K/UL — HIGH (ref 0–0.8)
MONOCYTES NFR BLD AUTO: 10.5 % — HIGH (ref 3–10)
NEUTROPHILS # BLD AUTO: 8 K/UL — SIGNIFICANT CHANGE UP (ref 1.8–8)
NEUTROPHILS NFR BLD AUTO: 61.9 % — SIGNIFICANT CHANGE UP (ref 37–73)
PLATELET # BLD AUTO: 540 K/UL — HIGH (ref 150–400)
POTASSIUM SERPL-MCNC: 4.1 MMOL/L — SIGNIFICANT CHANGE UP (ref 3.5–5.3)
POTASSIUM SERPL-SCNC: 4.1 MMOL/L — SIGNIFICANT CHANGE UP (ref 3.5–5.3)
PROT SERPL-MCNC: 7.8 G/DL — SIGNIFICANT CHANGE UP (ref 6.6–8.7)
PROTHROM AB SERPL-ACNC: 11.7 SEC — SIGNIFICANT CHANGE UP (ref 9.8–12.7)
RBC # BLD: 4.57 M/UL — SIGNIFICANT CHANGE UP (ref 4.4–5.2)
RBC # FLD: 15.3 % — SIGNIFICANT CHANGE UP (ref 11–15.6)
SODIUM SERPL-SCNC: 139 MMOL/L — SIGNIFICANT CHANGE UP (ref 135–145)
WBC # BLD: 13 K/UL — HIGH (ref 4.8–10.8)
WBC # FLD AUTO: 13 K/UL — HIGH (ref 4.8–10.8)

## 2018-05-17 PROCEDURE — 70547 MR ANGIOGRAPHY NECK W/O DYE: CPT | Mod: 26

## 2018-05-17 PROCEDURE — 70450 CT HEAD/BRAIN W/O DYE: CPT | Mod: 26

## 2018-05-17 PROCEDURE — 99223 1ST HOSP IP/OBS HIGH 75: CPT | Mod: AI

## 2018-05-17 PROCEDURE — 70544 MR ANGIOGRAPHY HEAD W/O DYE: CPT | Mod: 26,59

## 2018-05-17 PROCEDURE — 99285 EMERGENCY DEPT VISIT HI MDM: CPT

## 2018-05-17 PROCEDURE — 93010 ELECTROCARDIOGRAM REPORT: CPT

## 2018-05-17 PROCEDURE — 70551 MRI BRAIN STEM W/O DYE: CPT | Mod: 26

## 2018-05-17 RX ORDER — ACETAMINOPHEN 500 MG
650 TABLET ORAL EVERY 6 HOURS
Qty: 0 | Refills: 0 | Status: DISCONTINUED | OUTPATIENT
Start: 2018-05-17 | End: 2018-05-20

## 2018-05-17 RX ORDER — METOPROLOL TARTRATE 50 MG
1 TABLET ORAL
Qty: 0 | Refills: 0 | COMMUNITY

## 2018-05-17 RX ORDER — PANTOPRAZOLE SODIUM 20 MG/1
40 TABLET, DELAYED RELEASE ORAL
Qty: 0 | Refills: 0 | Status: DISCONTINUED | OUTPATIENT
Start: 2018-05-17 | End: 2018-05-20

## 2018-05-17 RX ORDER — PANTOPRAZOLE SODIUM 20 MG/1
1 TABLET, DELAYED RELEASE ORAL
Qty: 0 | Refills: 0 | COMMUNITY

## 2018-05-17 RX ORDER — ASPIRIN/CALCIUM CARB/MAGNESIUM 324 MG
325 TABLET ORAL DAILY
Qty: 0 | Refills: 0 | Status: DISCONTINUED | OUTPATIENT
Start: 2018-05-17 | End: 2018-05-17

## 2018-05-17 RX ORDER — ATORVASTATIN CALCIUM 80 MG/1
40 TABLET, FILM COATED ORAL AT BEDTIME
Qty: 0 | Refills: 0 | Status: DISCONTINUED | OUTPATIENT
Start: 2018-05-17 | End: 2018-05-20

## 2018-05-17 RX ORDER — GABAPENTIN 400 MG/1
0 CAPSULE ORAL
Qty: 0 | Refills: 0 | COMMUNITY

## 2018-05-17 RX ORDER — ATORVASTATIN CALCIUM 80 MG/1
0 TABLET, FILM COATED ORAL
Qty: 0 | Refills: 0 | COMMUNITY

## 2018-05-17 RX ORDER — SODIUM CHLORIDE 9 MG/ML
1000 INJECTION INTRAMUSCULAR; INTRAVENOUS; SUBCUTANEOUS
Qty: 0 | Refills: 0 | Status: DISCONTINUED | OUTPATIENT
Start: 2018-05-17 | End: 2018-05-18

## 2018-05-17 RX ADMIN — ATORVASTATIN CALCIUM 40 MILLIGRAM(S): 80 TABLET, FILM COATED ORAL at 23:48

## 2018-05-17 RX ADMIN — SODIUM CHLORIDE 50 MILLILITER(S): 9 INJECTION INTRAMUSCULAR; INTRAVENOUS; SUBCUTANEOUS at 23:48

## 2018-05-17 RX ADMIN — Medication 325 MILLIGRAM(S): at 18:08

## 2018-05-17 NOTE — ED ADULT NURSE NOTE - CHPI ED SYMPTOMS NEG
no vomiting/no change in level of consciousness/no loss of consciousness/no nausea/no numbness/no blurred vision/no dizziness/no confusion/no fever/no weakness

## 2018-05-17 NOTE — ED PROVIDER NOTE - CONDUCTED A DETAILED DISCUSSION WITH PATIENT AND/OR GUARDIAN REGARDING, MDM
radiology results/return to ED if symptoms worsen, persist or questions arise/lab results/need to admit/need for outpatient follow-up

## 2018-05-17 NOTE — H&P ADULT - NSHPPHYSICALEXAM_GEN_ALL_CORE
PHYSICAL EXAM:    Vital Signs Last 24 Hrs  T(C): 36.7 (17 May 2018 15:56), Max: 36.7 (17 May 2018 15:56)  T(F): 98.1 (17 May 2018 15:56), Max: 98.1 (17 May 2018 15:56)  HR: 67 (17 May 2018 15:56) (67 - 67)  BP: 137/100 (17 May 2018 15:56) (137/100 - 137/100)  BP(mean): --  RR: 16 (17 May 2018 15:56) (16 - 16)  SpO2: 99% (17 May 2018 15:56) (99% - 99%)    GENERAL: Pt lying comfortably, NAD.  ENMT: PERRL, +EOMI.  NECK: soft, Supple, No JVD,   CHEST/LUNG: Clear to auscultation bilaterally; No wheezing.  HEART: S1S2+, Regular rate and rhythm; No murmurs.  ABDOMEN: Soft, Nontender, Nondistended; Bowel sounds present.  MUSCULOSKELETAL: Normal range of motion.  SKIN: No rashes or lesions.  NEURO: AAOX3, power 5/5, sensation intact, + dysarthria, speech diff  PSYCH: normal mood. PHYSICAL EXAM:    Vital Signs Last 24 Hrs  T(C): 36.7 (17 May 2018 15:56), Max: 36.7 (17 May 2018 15:56)  T(F): 98.1 (17 May 2018 15:56), Max: 98.1 (17 May 2018 15:56)  HR: 67 (17 May 2018 15:56) (67 - 67)  BP: 137/100 (17 May 2018 15:56) (137/100 - 137/100)  BP(mean): --  RR: 16 (17 May 2018 15:56) (16 - 16)  SpO2: 99% (17 May 2018 15:56) (99% - 99%)    GENERAL: Pt lying comfortably, NAD.  ENMT: PERRL, +EOMI.  NECK: soft, Supple, No JVD,   CHEST/LUNG: Clear to auscultation bilaterally; No wheezing.  HEART: Irregular, No murmurs.  ABDOMEN: Soft, Nontender, Nondistended; Bowel sounds present.  MUSCULOSKELETAL: Normal range of motion.  SKIN: No rashes or lesions.  NEURO: AAOX3, power 5/5, sensation intact, + dysarthria, speech diff  PSYCH: normal mood.

## 2018-05-17 NOTE — ED ADULT NURSE NOTE - OBJECTIVE STATEMENT
pt AOX4 c/o expressive aphasia, pt stated symptoms started" long time now" pt denies nay pain, dizziness, headache. pt AOX4 c/o expressive aphasia, pt stated symptoms started" long time now" pt denies any pain, dizziness, headache, disorientation, pt feel a couple of days ago states she hit her head but denies any LOC, or disorientation, before or after the fall, pt was having a CT scan today at [facility] and was brought to ED for symptoms.

## 2018-05-17 NOTE — ED PROVIDER NOTE - CARE PLAN
Principal Discharge DX:	Cerebrovascular accident (CVA), unspecified mechanism  Secondary Diagnosis:	Hypertension, unspecified type  Secondary Diagnosis:	Atrial fibrillation, unspecified type

## 2018-05-17 NOTE — H&P ADULT - NSHPLABSRESULTS_GEN_ALL_CORE
LABS:                        13.3   13.0  )-----------( 540      ( 17 May 2018 16:44 )             41.6     05-17    139  |  97<L>  |  11.0  ----------------------------<  102  4.1   |  26.0  |  0.59    Ca    10.5<H>      17 May 2018 16:44    TPro  7.8  /  Alb  3.9  /  TBili  0.3<L>  /  DBili  x   /  AST  22  /  ALT  15  /  AlkPhos  109  05-17    PT/INR - ( 17 May 2018 16:44 )   PT: 11.7 sec;   INR: 1.06 ratio         PTT - ( 17 May 2018 16:44 )  PTT:31.5 sec    LIVER FUNCTIONS - ( 17 May 2018 16:44 )  Alb: 3.9 g/dL / Pro: 7.8 g/dL / ALK PHOS: 109 U/L / ALT: 15 U/L / AST: 22 U/L / GGT: x

## 2018-05-17 NOTE — H&P ADULT - HISTORY OF PRESENT ILLNESS
Pt is 64 y/o female with PMHx of HTN, HLD, Afib on coumadin (Ran out coumadin 2 months ago), GERD, MVP,  Osteoarthritis suspected CHAYA, was sent to ED by PMD after having outpatient MRI which showed subacute CVA. Pt states she had fall 4 weeks ago since then started feeling dizziness and later developed some difficulty in finding words /speech difficulty. She was seen by PMD which ordered MRI brain which was done today and showed subacute CVA. During my evaluation Pt with difficulty finding words but speech clear, she denied any other complaints, no focal weakness, sensory disturbance or LOC. No fever, chills, nausea, vomiting, headache, chest pain, SOB, palpitation.

## 2018-05-17 NOTE — ED ADULT TRIAGE NOTE - CHIEF COMPLAINT QUOTE
patient comes to ed from doctors office for eval of expressive aphasia. patient reportedly had fall 4 weeks ago and has been having dizziness/difficulty finding words. so sent for mri and found to have possible subacute infarct and blood. dr galvan to bedside 1552; priority ct initiated. unsure if patient is taking warfarin.

## 2018-05-17 NOTE — H&P ADULT - ASSESSMENT
Pt had outside MRI which showed     Acute /Subacute CVA:  - Admit to stroke unit.  - Follow stroke protocol with neuro check, Aspiration /fall precaution  - ASA /Lipitor  - Obtain MRI head, MRA head /neck, TTE.  - Neurology consulted. Pt is 62 y/o female with PMHx of HTN, HLD, Afib on coumadin (Ran out coumadin 2 months ago), GERD, MVP,  Osteoarthritis suspected CHAYA, was having some speech difficulty for last 2-3 weeks, had outpatient MRI which showed subacute CVA and she was sent to ED. In ED /100-->179/85, CTH shows acute /subacute frontal infarcts.        Acute /Subacute CVA:  - Admit to stroke unit.  - Follow stroke protocol with neuro check, Aspiration /fall precaution  - ASA /Lipitor  - Obtain MRI head, MRA head /neck, TTE.  - Neurology consulted. Pt is 64 y/o female with PMHx of HTN, HLD, Afib on coumadin (Ran out coumadin 2 months ago), GERD, MVP,  Osteoarthritis suspected CHAYA, was having some speech difficulty for last 2-3 weeks, had outpatient MRI which showed subacute CVA and she was sent to ED. In ED /100-->179/85, CTH shows acute /subacute frontal infarcts, was code stroke in ED. Admitted to stroke unit for Subacute /Acute CVA      Acute /Subacute CVA:  - Admit to stroke unit.  - Follow stroke protocol with neuro check, Aspiration /fall precaution.  - Lipitor  - Obtain MRI head, MRA head /neck, TTE.  - Neurology consulted from ED.   - OT /PT /PM&R / Speech /swallow consult.    >H/o AFib- Ran out coumadin 2 months ago. HR controlled. On metoprolol at home, resume from tomorrow, permissive BP control for 24 hours.  >H/o HTN- Permissive BP control for 24 hours, resume from tomorrow.   >H/o HLD- Resume statins Pt is 62 y/o female with PMHx of HTN, HLD, Afib on coumadin (Ran out coumadin 2 months ago), GERD, MVP,  OA, suspected CHAYA, was having some speech difficulty for last 2-3 weeks, had outpatient MRI which showed subacute CVA and minimal subcortical blood and she was sent to ED. In ED /100-->179/85, CTH shows acute /subacute frontal infarcts, was code stroke in ED. Admitted to stroke unit for Subacute /Acute CVA      Acute /Subacute CVA:  - outside MRI reports minimal amount of subcortical hemorrhage and reports say compatible with subacute infarcts??.  CT head done here shows no bleeding. I spoke to on call radiologist here and CT images reviewed with him- per radiologist there is no bleed on CT head- recommended repeat MRI here. I also called outpatient MRI at [facility] and spoke with Dr Goodwin who read o/p MRI- he states it was mostly infarct but there was additional very small area of high signal intensity which may be bleed but less likely per him- he further states if CT head here shows no bleed then its less likely bleed and it was definitely infarcts.    - Admit to stroke unit. Follow stroke protocol with neuro check, Aspiration /fall precaution.  - Lipitor for now, s/p ASA dose in ED, will hold for now until PT get MRI head and neurology eval.    - Obtain MRI head, MRA head /neck, TTE.  - Neurology consulted from ED.   - OT /PT /PM&R / Speech /swallow consult.    >H/o AFib- Ran out coumadin 2 months ago. HR controlled. On metoprolol at home, resume from tomorrow, permissive BP control for 24 hours. AC resumption to be decided pending MRI to look for bleed given above discrepancies in reports.   >H/o HTN- Permissive BP control for 24 hours, resume from tomorrow.   >H/o HLD- Resume statins Pt is 62 y/o female with PMHx of HTN, HLD, Afib on coumadin (Ran out coumadin 2 months ago), GERD, MVP,  OA, suspected CHAYA, was having some speech difficulty for last 2-3 weeks, had outpatient MRI which showed subacute CVA and minimal subcortical blood and she was sent to ED. In ED /100-->179/85, CTH shows acute /subacute frontal infarcts, was code stroke in ED. Admitted to stroke unit for Subacute /Acute CVA      Acute /Subacute CVA:  - outside MRI reports minimal amount of subcortical hemorrhage and reports say compatible with subacute infarcts??.  CT head done here shows no bleeding. I spoke to on call radiologist here and CT images reviewed with him- per radiologist there is no bleed on CT head- recommended repeat MRI here. I also called outpatient MRI at [facility] and spoke with Dr Goodwin who read o/p MRI- he states it was mostly infarct but there was additional very small area of high signal intensity which may be bleed but less likely per him- he further states if CT head here shows no bleed then its less likely bleed and it was definitely infarcts.    - Admit to stroke unit. Follow stroke protocol with neuro check, Aspiration /fall precaution.  - Lipitor for now, s/p ASA dose in ED, will hold for now until PT get MRI head and neurology eval.    - Obtain MRI head, MRA head /neck, TTE.  - Neurology consulted from ED.   - OT /PT /PM&R / Speech /swallow consult.    >H/o AFib- Ran out coumadin 2 months ago. HR controlled. On metoprolol at home, resume from tomorrow, permissive BP control for 24 hours. AC resumption to be decided pending above.    >H/o HTN- Permissive BP control for 24 hours, resume from tomorrow.   >H/o HLD- Resume statins

## 2018-05-17 NOTE — ED PROVIDER NOTE - OBJECTIVE STATEMENT
THIS IS A 64 Y/O F PT WITH A PMHX OF DIFCULTY SPEAKING. FELL 4 WEEKS AGO.   MRI DIAGNOSED WITH A FRONTAL L FRONTAL PARIETAL REGION RESTRICTED SUB-CORTICAL ARACHNOID HEMORRHAGE. NON COMPLIANT WITH ANTIHYPERTENSIVE AND BLOOD THINNING MEDICATION. THIS IS A 64 Y/O F PT WITH A PMHX OF AFIB, BRONCHITIS, COPD, GERD, MITRAL VALVE PROLAPSE, OSTEOARTHRITIS, SLEEP APNEA, PNX AND SPLENECTOMY BIBA TO THE ED C/O DIFFICULTY SPEAKING. PT FELL 4 WEEKS AGO. WENT TO PMD WHO ORDERED AND MRI. MRI DIAGNOSED WITH A FRONTAL L FRONTAL PARIETAL REGION RESTRICTED SUB-CORTICAL ARACHNOID HEMORRHAGE. NON COMPLIANT WITH ANTIHYPERTENSIVE AND BLOOD THINNING MEDICATION. UNABLE TO OBTAIN FULL HX AND Ros DUE TO DIFFICULTY SPEAKING. DENIES FEVER, CHILLS, CHEST PAIN, N/V/C OR ANY OTHER COMPLAINTS.

## 2018-05-18 ENCOUNTER — TRANSCRIPTION ENCOUNTER (OUTPATIENT)
Age: 63
End: 2018-05-18

## 2018-05-18 LAB
ANION GAP SERPL CALC-SCNC: 11 MMOL/L — SIGNIFICANT CHANGE UP (ref 5–17)
APPEARANCE UR: CLEAR — SIGNIFICANT CHANGE UP
BACTERIA # UR AUTO: ABNORMAL
BILIRUB UR-MCNC: NEGATIVE — SIGNIFICANT CHANGE UP
BUN SERPL-MCNC: 7 MG/DL — LOW (ref 8–20)
CALCIUM SERPL-MCNC: 9.6 MG/DL — SIGNIFICANT CHANGE UP (ref 8.6–10.2)
CHLORIDE SERPL-SCNC: 99 MMOL/L — SIGNIFICANT CHANGE UP (ref 98–107)
CHOLEST SERPL-MCNC: 204 MG/DL — HIGH (ref 110–199)
CO2 SERPL-SCNC: 30 MMOL/L — HIGH (ref 22–29)
COLOR SPEC: YELLOW — SIGNIFICANT CHANGE UP
CREAT SERPL-MCNC: 0.58 MG/DL — SIGNIFICANT CHANGE UP (ref 0.5–1.3)
DIFF PNL FLD: ABNORMAL
EPI CELLS # UR: ABNORMAL
GLUCOSE SERPL-MCNC: 102 MG/DL — SIGNIFICANT CHANGE UP (ref 70–115)
GLUCOSE UR QL: NEGATIVE MG/DL — SIGNIFICANT CHANGE UP
HBA1C BLD-MCNC: 5.1 % — SIGNIFICANT CHANGE UP (ref 4–5.6)
HCT VFR BLD CALC: 39.6 % — SIGNIFICANT CHANGE UP (ref 37–47)
HDLC SERPL-MCNC: 41 MG/DL — LOW
HGB BLD-MCNC: 12.3 G/DL — SIGNIFICANT CHANGE UP (ref 12–16)
KETONES UR-MCNC: NEGATIVE — SIGNIFICANT CHANGE UP
LEUKOCYTE ESTERASE UR-ACNC: ABNORMAL
LIPID PNL WITH DIRECT LDL SERPL: 141 MG/DL — SIGNIFICANT CHANGE UP
MCHC RBC-ENTMCNC: 28.4 PG — SIGNIFICANT CHANGE UP (ref 27–31)
MCHC RBC-ENTMCNC: 31.1 G/DL — LOW (ref 32–36)
MCV RBC AUTO: 91.5 FL — SIGNIFICANT CHANGE UP (ref 81–99)
NITRITE UR-MCNC: NEGATIVE — SIGNIFICANT CHANGE UP
PH UR: 7 — SIGNIFICANT CHANGE UP (ref 5–8)
PLATELET # BLD AUTO: 520 K/UL — HIGH (ref 150–400)
POTASSIUM SERPL-MCNC: 4.4 MMOL/L — SIGNIFICANT CHANGE UP (ref 3.5–5.3)
POTASSIUM SERPL-SCNC: 4.4 MMOL/L — SIGNIFICANT CHANGE UP (ref 3.5–5.3)
PROT UR-MCNC: NEGATIVE MG/DL — SIGNIFICANT CHANGE UP
RBC # BLD: 4.33 M/UL — LOW (ref 4.4–5.2)
RBC # FLD: 15.4 % — SIGNIFICANT CHANGE UP (ref 11–15.6)
RBC CASTS # UR COMP ASSIST: ABNORMAL /HPF (ref 0–4)
SODIUM SERPL-SCNC: 140 MMOL/L — SIGNIFICANT CHANGE UP (ref 135–145)
SP GR SPEC: 1 — LOW (ref 1.01–1.02)
TOTAL CHOLESTEROL/HDL RATIO MEASUREMENT: 5 RATIO — SIGNIFICANT CHANGE UP (ref 3.3–7.1)
TRIGL SERPL-MCNC: 112 MG/DL — SIGNIFICANT CHANGE UP (ref 10–200)
UROBILINOGEN FLD QL: NEGATIVE MG/DL — SIGNIFICANT CHANGE UP
WBC # BLD: 9.2 K/UL — SIGNIFICANT CHANGE UP (ref 4.8–10.8)
WBC # FLD AUTO: 9.2 K/UL — SIGNIFICANT CHANGE UP (ref 4.8–10.8)
WBC UR QL: SIGNIFICANT CHANGE UP

## 2018-05-18 PROCEDURE — 99233 SBSQ HOSP IP/OBS HIGH 50: CPT

## 2018-05-18 PROCEDURE — 99223 1ST HOSP IP/OBS HIGH 75: CPT

## 2018-05-18 PROCEDURE — 99222 1ST HOSP IP/OBS MODERATE 55: CPT

## 2018-05-18 RX ORDER — RIVAROXABAN 15 MG-20MG
1 KIT ORAL
Qty: 30 | Refills: 0 | OUTPATIENT
Start: 2018-05-18 | End: 2018-06-16

## 2018-05-18 RX ORDER — METOPROLOL TARTRATE 50 MG
25 TABLET ORAL EVERY 6 HOURS
Qty: 0 | Refills: 0 | Status: DISCONTINUED | OUTPATIENT
Start: 2018-05-18 | End: 2018-05-19

## 2018-05-18 RX ORDER — ASPIRIN/CALCIUM CARB/MAGNESIUM 324 MG
81 TABLET ORAL DAILY
Qty: 0 | Refills: 0 | Status: DISCONTINUED | OUTPATIENT
Start: 2018-05-18 | End: 2018-05-18

## 2018-05-18 RX ORDER — RIVAROXABAN 15 MG-20MG
20 KIT ORAL EVERY 24 HOURS
Qty: 0 | Refills: 0 | Status: DISCONTINUED | OUTPATIENT
Start: 2018-05-18 | End: 2018-05-20

## 2018-05-18 RX ORDER — LISINOPRIL 2.5 MG/1
5 TABLET ORAL DAILY
Qty: 0 | Refills: 0 | Status: DISCONTINUED | OUTPATIENT
Start: 2018-05-18 | End: 2018-05-20

## 2018-05-18 RX ADMIN — Medication 81 MILLIGRAM(S): at 12:00

## 2018-05-18 RX ADMIN — Medication 25 MILLIGRAM(S): at 17:50

## 2018-05-18 RX ADMIN — Medication 25 MILLIGRAM(S): at 12:00

## 2018-05-18 RX ADMIN — RIVAROXABAN 20 MILLIGRAM(S): KIT at 17:50

## 2018-05-18 RX ADMIN — SODIUM CHLORIDE 50 MILLILITER(S): 9 INJECTION INTRAMUSCULAR; INTRAVENOUS; SUBCUTANEOUS at 12:00

## 2018-05-18 RX ADMIN — ATORVASTATIN CALCIUM 40 MILLIGRAM(S): 80 TABLET, FILM COATED ORAL at 20:05

## 2018-05-18 NOTE — CONSULT NOTE ADULT - SUBJECTIVE AND OBJECTIVE BOX
[facility]                                     Neurology at [place]                                 [facility] [place], & [address]                                  [address]                                        [place], [place], [address]                                             [phone]        HPI:The patient is a 63y Female with about 4 weeks of dificulty speaking without significant limb weakness who went to her doctor because she fell recently.  They sent her for outpatient MRI brain.  She was then sent to ED b/c out-patient MRI brain showed stroke.  Currently she has no clear focal weakness, but is aphasic.  There is nothing which aggrevates or alleviates these symptoms.  She has afib, was supposed to be on a/c with coumadin, but is non-compliant with it.  Neuro eval is requested.    PAST MEDICAL & SURGICAL HISTORY:  Atrial fibrillation  GERD (gastroesophageal reflux disease)  Sleep apnea  Chronic bronchitis  Osteoarthritis  COPD (chronic obstructive pulmonary disease)  Mitral valve prolapse  Hypertension  H/O pneumothorax  S/P splenectomy  S/P knee replacement: R  Hernia, ventral      MEDICATIONS  (STANDING):  aspirin  chewable 81 milliGRAM(s) Oral daily  atorvastatin 40 milliGRAM(s) Oral at bedtime  lisinopril 5 milliGRAM(s) Oral daily  metoprolol tartrate 25 milliGRAM(s) Oral every 6 hours  pantoprazole    Tablet 40 milliGRAM(s) Oral before breakfast  sodium chloride 0.9%. 1000 milliLiter(s) (50 mL/Hr) IV Continuous <Continuous>    MEDICATIONS  (PRN):  acetaminophen   Tablet 650 milliGRAM(s) Oral every 6 hours PRN For Temp greater than 38 C (100.4 F)  acetaminophen   Tablet. 650 milliGRAM(s) Oral every 6 hours PRN Mild Pain (1 - 3)      Allergies    No Known Allergies    Intolerances  none      SOCIAL HISTORY:  no tob, alcohol or drugs    FAMILY HISTORY:  Family history unknown      ROS:  ROS: 14 point ROS negative other than what is present in HPI or below    Vital Signs Last 24 Hrs  T(C): 36.9 (18 May 2018 11:54), Max: 36.9 (18 May 2018 11:54)  T(F): 98.4 (18 May 2018 11:54), Max: 98.4 (18 May 2018 11:54)  HR: 81 (18 May 2018 07:15) (67 - 150)  BP: 129/95 (18 May 2018 07:15) (122/72 - 179/85)  BP(mean): --  RR: 22 (18 May 2018 07:15) (16 - 22)  SpO2: 95% (18 May 2018 07:15) (95% - 100%)      General: NAD    Detailed Neurologic Exam:    Mental status: The patient is awake and alert and has normal attention span.  The patient is fully oriented in 3 spheres. The patient is oriented to current events. She has expressive>receptive    Cranial nerves: . Pupils equal and react symmetrically to light. There is no visual field deficit to confrontation. Extraocular motion is full with no nystagmus. There is no ptosis. Facial sensation is intact. Facial musculature is symmetric. Palate elevates symmetrically. Shoulder shrug is normal. Tongue is midline.    Motor: There is normal bulk and tone.  There is no tremor.  Strength is 5/5 in the right arm and leg.   Strength is 5/5 in the left arm and leg.    Sensation: Intact to light touch and pin in 4 extremities    Reflexes: 1+ throughout and plantar responses are flexor on left, right toe is equivocal.    Cerebellar: There is no dysmetria on finger to nose testing.    Gait : deferred    LABS:                         12.3   9.2   )-----------( 520      ( 18 May 2018 08:56 )             39.6       05-18    140  |  99  |  7.0<L>  ----------------------------<  102  4.4   |  30.0<H>  |  0.58    Ca    9.6      18 May 2018 08:56    TPro  7.8  /  Alb  3.9  /  TBili  0.3<L>  /  DBili  x   /  AST  22  /  ALT  15  /  AlkPhos  109  05-17      PT/INR - ( 17 May 2018 16:44 )   PT: 11.7 sec;   INR: 1.06 ratio         PTT - ( 17 May 2018 16:44 )  PTT:31.5 sec  Lipid Profile (05.18.18 @ 08:56)    Total Cholesterol/HDL Ratio Measurement: 5.0 Ratio    Cholesterol, Serum: 204 mg/dL    Triglycerides, Serum: 112 mg/dL    HDL Cholesterol, Serum: 41 mg/dL    Direct LDL: 141: LDL Cholesterol --- Interpretive Comment (for adults 18 and over)  Optimal LDL Level may vary based on clinical situation  Below 70                  Ideal for people at very high risk of heart  disease  Below 100                Ideal for people at risk of heart disease  100 - 129                   [place]  130 - 159                   Borderline high  160 - 189                   High  190 and Above          Very high mg/dL        RADIOLOGY & ADDITIONAL STUDIES (independently reviewed unless otherwise noted):  MRI brain shows faint diffusion in left MCA territory,on the left with 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

## 2018-05-18 NOTE — SWALLOW BEDSIDE ASSESSMENT ADULT - SWALLOW EVAL: RECOMMENDED FEEDING/EATING TECHNIQUES
oral hygiene/maintain upright posture during/after eating for 30 mins/crush medication (when feasible)/position upright (90 degrees)/small sips/bites

## 2018-05-18 NOTE — DISCHARGE NOTE ADULT - MEDICATION SUMMARY - MEDICATIONS TO CHANGE
I will SWITCH the dose or number of times a day I take the medications listed below when I get home from the hospital:    lisinopril 20 mg oral tablet  -- 1 tab(s) by mouth once a day    atorvastatin 10 mg oral tablet  -- 1 tab(s) by mouth once a day

## 2018-05-18 NOTE — PHYSICAL THERAPY INITIAL EVALUATION ADULT - ACTIVE RANGE OF MOTION EXAMINATION, REHAB EVAL
bilateral  lower extremity Active ROM was WFL (within functional limits)/bilateral upper extremity Active ROM was WFL (within functional limits)/Leo LE 3-/5

## 2018-05-18 NOTE — PROGRESS NOTE ADULT - SUBJECTIVE AND OBJECTIVE BOX
Admitted with chief complaint of Patient is a 63y old  Female who presents with a chief complaint of Acute CVA (18 May 2018 04:28)    Patient seen and examined at bedside, No acute overnight events.  Patient states that she is doing well.  Tolerated meals without any issues but does not like the puree diet.  Speech improving today. Will have speech and swallow reevaluate.  No other complaints.    REVIEW OF SYSTEMS  Denies any chest pain, SOB, weakness, headache, dizziness, constipation, abdominal pain, NVD, fever.    VS: Vital Signs Last 24 Hrs  T(C): 36.9 (18 May 2018 11:54), Max: 36.9 (18 May 2018 11:54)  T(F): 98.4 (18 May 2018 11:54), Max: 98.4 (18 May 2018 11:54)  HR: 81 (18 May 2018 07:15) (67 - 150)  BP: 129/95 (18 May 2018 07:15) (122/72 - 179/85)  BP(mean): --  RR: 22 (18 May 2018 07:15) (16 - 22)  SpO2: 95% (18 May 2018 07:15) (95% - 100%)    PHYSICAL EXAM:    GENERAL: Pt lying comfortably, NAD.  HEENT: NC/AT, PERRL, +EOMI.  NECK: soft, Supple, No JVD,   CHEST/LUNG: Clear to auscultation bilaterally; No wheezing.  HEART: Irregular, No murmurs.  ABDOMEN: Soft, Nontender, Nondistended; Bowel sounds present.  MUSCULOSKELETAL: Normal range of motion.  SKIN: No rashes or lesions.  NEURO: AAOX3, power 5/5, sensation intact, + dysarthrria, speech improving  PSYCH: normal mood.    Labs:                        12.3   9.2   )-----------( 520      ( 18 May 2018 08:56 )             39.6   05-18    140  |  99  |  7.0<L>  ----------------------------<  102  4.4   |  30.0<H>  |  0.58    Ca    9.6      18 May 2018 08:56    TPro  7.8  /  Alb  3.9  /  TBili  0.3<L>  /  DBili  x   /  AST  22  /  ALT  15  /  AlkPhos  109  05-17      Medications:  MEDICATIONS  (STANDING):  aspirin  chewable 81 milliGRAM(s) Oral daily  atorvastatin 40 milliGRAM(s) Oral at bedtime  lisinopril 5 milliGRAM(s) Oral daily  metoprolol tartrate 25 milliGRAM(s) Oral every 6 hours  pantoprazole    Tablet 40 milliGRAM(s) Oral before breakfast  sodium chloride 0.9%. 1000 milliLiter(s) (50 mL/Hr) IV Continuous <Continuous>    MEDICATIONS  (PRN):  acetaminophen   Tablet 650 milliGRAM(s) Oral every 6 hours PRN For Temp greater than 38 C (100.4 F)  acetaminophen   Tablet. 650 milliGRAM(s) Oral every 6 hours PRN Mild Pain (1 - 3) Admitted with chief complaint of Patient is a 63y old  Female who presents with a chief complaint of Acute CVA (18 May 2018 04:28)    Patient seen and examined at bedside, No acute overnight events.  Patient states that she is doing well.  Tolerated meals without any issues but does not like the puree diet. Speech improving today. Will have speech and swallow to evaluate.  No other complaints.    REVIEW OF SYSTEMS  Denies any chest pain, SOB, weakness, headache, dizziness, constipation, abdominal pain, NVD, fever.    VS: Vital Signs Last 24 Hrs  T(C): 36.9 (18 May 2018 11:54), Max: 36.9 (18 May 2018 11:54)  T(F): 98.4 (18 May 2018 11:54), Max: 98.4 (18 May 2018 11:54)  HR: 81 (18 May 2018 07:15) (67 - 150)  BP: 129/95 (18 May 2018 07:15) (122/72 - 179/85)  BP(mean): --  RR: 22 (18 May 2018 07:15) (16 - 22)  SpO2: 95% (18 May 2018 07:15) (95% - 100%)    PHYSICAL EXAM:    GENERAL: Pt lying comfortably, NAD.  HEENT: NC/AT, PERRL, +EOMI.  NECK: soft, Supple, No JVD,   CHEST/LUNG: Clear to auscultation bilaterally; No wheezing.  HEART: Irregular, No murmurs.  ABDOMEN: Soft, Nontender, Nondistended; Bowel sounds present.  MUSCULOSKELETAL: Normal range of motion.  SKIN: No rashes or lesions.  NEURO: AAOX3, power 5/5, sensation intact, + dysarthrria, speech improving  PSYCH: normal mood.    Labs:                        12.3   9.2   )-----------( 520      ( 18 May 2018 08:56 )             39.6   05-18    140  |  99  |  7.0<L>  ----------------------------<  102  4.4   |  30.0<H>  |  0.58    Ca    9.6      18 May 2018 08:56    TPro  7.8  /  Alb  3.9  /  TBili  0.3<L>  /  DBili  x   /  AST  22  /  ALT  15  /  AlkPhos  109  05-17      Medications:  MEDICATIONS  (STANDING):  aspirin  chewable 81 milliGRAM(s) Oral daily  atorvastatin 40 milliGRAM(s) Oral at bedtime  lisinopril 5 milliGRAM(s) Oral daily  metoprolol tartrate 25 milliGRAM(s) Oral every 6 hours  pantoprazole    Tablet 40 milliGRAM(s) Oral before breakfast  sodium chloride 0.9%. 1000 milliLiter(s) (50 mL/Hr) IV Continuous <Continuous>    MEDICATIONS  (PRN):  acetaminophen   Tablet 650 milliGRAM(s) Oral every 6 hours PRN For Temp greater than 38 C (100.4 F)  acetaminophen   Tablet. 650 milliGRAM(s) Oral every 6 hours PRN Mild Pain (1 - 3)
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
## 2018-05-18 NOTE — DISCHARGE NOTE ADULT - HOSPITAL COURSE
Pt is 62 y/o female with PMHx of HTN, HLD, Afib on coumadin (Ran out coumadin 2 months ago), GERD, MVP, OA, suspected CHAYA, medication non-complaints, was having some speech difficulty for last 3-4 weeks, had outpatient MRI which showed subacute CVA and minimal subcortical blood and she was sent to ED. In ED /100-->179/85, CTH shows acute /subacute frontal infarcts, was code stroke in ED. Admitted to stroke unit for Subacute /Acute CVA. Here MRI head with subacute CVA, no hemorrhage, MRI with IV contrast ruled out any mass, MRA brain /Neck with no significant stenosis, TTE with normal EF. Evaluated by cardiology /neurology and recommendation appreciated. AC resumed with Xarelto per cardiology (Pt was on coumadin at home- switched here). Deemed stable for discharge from cardiology /neurology POV. PT initially recommended acute rehab- followed up and Pt did well and PT recommended home with home PT /home care- Cm set up home services for PT and speech therapy. Pt medically stable for discharge with outpatient f/u and counseled for medication adherence.     PHYSICAL EXAM:    Vital Signs Last 24 Hrs  T(C): 36.9 (20 May 2018 08:06), Max: 36.9 (20 May 2018 08:06)  T(F): 98.5 (20 May 2018 08:06), Max: 98.5 (20 May 2018 08:06)  HR: 64 (20 May 2018 08:06) (64 - 91)  BP: 122/66 (20 May 2018 08:31) (99/77 - 132/70)  BP(mean): --  RR: 17 (20 May 2018 08:06) (12 - 17)  SpO2: 97% (20 May 2018 08:06) (95% - 97%)    GENERAL: Pt lying comfortably, NAD.  HEENT: NC/AT, PERRL, +EOMI.  NECK: soft, Supple, No JVD,   CHEST/LUNG: Clear to auscultation bilaterally; No wheezing.  HEART: Irregular, No murmurs.  ABDOMEN: Soft, Nontender, Nondistended; Bowel sounds present.  MUSCULOSKELETAL: Normal range of motion.  SKIN: No rashes or lesions.  NEURO: AAOX3, power 5/5, sensation intact, speech improving  PSYCH: normal mood.    Total time spent on discharge 35 minutes.

## 2018-05-18 NOTE — CONSULT NOTE ADULT - ASSESSMENT
The patient is a 63y Female who is followed by neurology because of subacute CVA    Likely embolic left MCA infarct  - likely due to afib and no anticoagulation  - may not need asa if no other induications  - restart A/C  - speech therapy    Atrial fibrillation  - resume anticoagulation  - continue metoprolol    HTN  - maintain normotension  - continue metoprolol/lisinopril    HLD  -  on admission,   - goal LDL<70  - continue lipitor 40 mg daily    will follow with you    Wilmar Salinas MD PhD   [id number]

## 2018-05-18 NOTE — PROGRESS NOTE ADULT - ASSESSMENT
Pt is 62 y/o female with PMHx of HTN, HLD, Afib on coumadin (Ran out coumadin 2 months ago), GERD, MVP,  OA, suspected CHAYA, was having some speech difficulty for last 2-3 weeks, had outpatient MRI which showed subacute CVA and minimal subcortical blood and she was sent to ED. In ED /100-->179/85, CTH shows acute /subacute frontal infarcts, was code stroke in ED. Admitted to stroke unit for Subacute /Acute CVA.  Patient symptoms improved slightly today.  Feels well and willing to try NOAC for her afib.        Acute /Subacute CVA:  - outside MRI reports minimal amount of subcortical hemorrhage and reports say compatible with subacute infarcts??.  CT head done here shows no bleeding. I spoke to on call radiologist here and CT images reviewed with him- per radiologist there is no bleed on CT head- recommended repeat MRI here. I also called outpatient MRI at [facility] and spoke with Dr Goodwin who read o/p MRI- he states it was mostly infarct but there was additional very small area of high signal intensity which may be bleed but less likely per him- he further states if CT head here shows no bleed then its less likely bleed and it was definitely infarcts.    - Stroke protocol with neuro check, Aspiration /fall precaution. Neurology on Board  - Lipitor 40mg QD  - ASA 81mg QD  - ECHO 60-65% EF  - HBa1c 5.1, lipid panel noted.  - CT head subacute left frontal infarct.  MRA head/neck negative. MRI Head showed no hemorrhage, subacute infarct left frontal/parietal lobes.  Vasogenic edema noted, will obtain further workup with post gadolinium MRI.   - OT /PT /PM&R / Speech /swallow consult.    >H/o AFib- Ran out coumadin 2 months ago. Will check insurance coverage for Xarelto. Metoprolol 25mg Q6  >H/o HTN- Lisinopril 5mg QD and Metoprolol 25mg Q6  >H/o HLD- Lipitor 40mg QD  >DVT ppx- VCD boots Pt is 64 y/o female with PMHx of HTN, HLD, Afib on coumadin (Ran out coumadin 2 months ago), GERD, MVP,  OA, suspected CHAYA, was having some speech difficulty for last 2-3 weeks, had outpatient MRI which showed subacute CVA and minimal subcortical blood and she was sent to ED. In ED /100-->179/85, CTH shows acute /subacute frontal infarcts, was code stroke in ED. Admitted to stroke unit for Subacute /Acute CVA.  Here MRI head with subacute CVA, no hemorrhage, MRA brain /Neck with no significant stenosis, TTE with normal EF. Evaluated by cardiology /neurology and recommendation appreciated.         Acute /Subacute CVA:  - Likely embolic from underlying Afib with coumadin non-compliant.  - MRI head ruled out hemorrhage. Images /TTE as above. Vasogenic edema noted, neuro recommended MRI with gadolinium.   - C/w Stroke protocol with neuro check, Aspiration /fall precaution.   - C/w Lipitor 40mg QD, resume AC per neurology- Pt willing to try NOACs. No Need of ASA per neurology.   - HBa1c 5.1, .  - PM&R eval noted, pending PT eval/ Speech /swallow consult.    >H/o AFib- Ran out coumadin 2 months ago. Resume AC per neuro, cardiology recommended switch coumadin to NOACs- pt ok with Xarelto- covered by insurance and started, c/w Metoprolol 25mg Q6 (home dose 200 mg QD)  >H/o HTN- Lisinopril 5mg QD and Metoprolol 25mg Q6  >H/o HLD- Lipitor 40mg QD  >DVT ppx- VCD boots

## 2018-05-18 NOTE — PHYSICAL THERAPY INITIAL EVALUATION ADULT - IMPAIRMENTS FOUND, PT EVAL
neuromotor development and sensory integration/gait, locomotion, and balance/muscle strength/aerobic capacity/endurance

## 2018-05-18 NOTE — PROGRESS NOTE ADULT - ATTENDING COMMENTS
I have seen and examined the patient with resident and agree with the above assessment and plan. I discussed the case with Dr Salinas neurology-  resume and no need of ASA- CVA likely embolic from Afib. MRI also reported some vasogenic edema- images reviewed by neurologist and recommended MRI with gadolinium.

## 2018-05-18 NOTE — PHYSICAL THERAPY INITIAL EVALUATION ADULT - PLANNED THERAPY INTERVENTIONS, PT EVAL
balance training/stretching/bed mobility training/transfer training/neuromuscular re-education/ROM/strengthening/gait training

## 2018-05-18 NOTE — DISCHARGE NOTE ADULT - CARE PLAN
Principal Discharge DX:	Cerebrovascular accident (CVA), unspecified mechanism  Goal:	To maintain functionality  Assessment and plan of treatment:	Take medicine as reconciled. Follow up with Neurology /Cardiology in 1-2 week. Home PT / speech therapy set up.  Secondary Diagnosis:	Chronic atrial fibrillation  Assessment and plan of treatment:	Take medicine as reconciled, your home coumadin switched to Xarelto, take as reconciled. Its important you take medicine every day. Follow up with cardiology.  Secondary Diagnosis:	Hypertension, unspecified type  Assessment and plan of treatment:	Bp stable, C/w your home medicine and follow up with your cardiology. Lisinopril 5 mg and Metoprolol 200 mg.  Secondary Diagnosis:	Pure hypercholesterolemia  Assessment and plan of treatment:	Resume home medicine.
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
## 2018-05-18 NOTE — DISCHARGE NOTE ADULT - PATIENT PORTAL LINK FT
You can access the [facility] Patient Portal, offered by [facility], by registering with the following website: [email or web address]

## 2018-05-18 NOTE — PHYSICAL THERAPY INITIAL EVALUATION ADULT - ADDITIONAL COMMENTS
as per patient lives alone in one floor apartment, no steps to enter, owns RW,  motorize W/C, (+) fall last month, ambulates short distances only with use of RW, does not have family members or friends toa ssist pt. upon D/C home

## 2018-05-18 NOTE — DISCHARGE NOTE ADULT - CARE PROVIDER_API CALL
Shirley Lam), Cardiovascular Disease; Internal Medicine  [address]  [address]  [place] [address]  Phone: [phone]  Fax: [phone]    Wilmar Salinas (MD; PhD), Neurology; Vascular Neurology  [address]  [address]  [place] [address]  Phone: [phone]  Fax: [phone]    PCP,   Phone: (   )    -  Fax: (   )    -

## 2018-05-18 NOTE — DISCHARGE NOTE ADULT - MEDICATION SUMMARY - MEDICATIONS TO TAKE
I will START or STAY ON the medications listed below when I get home from the hospital:    aspirin 81 mg oral tablet, chewable  -- 1 tab(s) by mouth once a day  -- Indication: For CVA    Prinivil 5 mg oral tablet  -- 1 tab(s) by mouth once a day  -- Indication: For Hypertension, unspecified type    Xarelto 20 mg oral tablet  -- 1 tab(s) by mouth once a day   -- Check with your doctor before becoming pregnant.  It is very important that you take or use this exactly as directed.  Do not skip doses or discontinue unless directed by your doctor.  Obtain medical advice before taking any non-prescription drugs as some may affect the action of this medication.  Take with food.    -- Indication: For Afib    Lipitor 40 mg oral tablet  -- 1 tab(s) by mouth once a day (at bedtime)  -- Indication: For CVA    metoprolol succinate 200 mg oral tablet, extended release  -- 1 tab(s) by mouth once a day  -- Indication: For Hypertension, unspecified type    Protonix 40 mg oral delayed release tablet  -- 1 tab(s) by mouth once a day  -- Indication: For GERd

## 2018-05-18 NOTE — DISCHARGE NOTE ADULT - PLAN OF CARE
To maintain functionality Take medicine as reconciled. Follow up with Neurology /Cardiology in 1-2 week. Home PT / speech therapy set up. Take medicine as reconciled, your home coumadin switched to Xarelto, take as reconciled. Its important you take medicine every day. Follow up with cardiology. Bp stable, C/w your home medicine and follow up with your cardiology. Lisinopril 5 mg and Metoprolol 200 mg. Resume home medicine.

## 2018-05-18 NOTE — PHYSICAL THERAPY INITIAL EVALUATION ADULT - CRITERIA FOR SKILLED THERAPEUTIC INTERVENTIONS
predicted duration of therapy intervention/anticipated equipment needs at discharge/risk reduction/prevention/therapy frequency/anticipated discharge recommendation/functional limitations in following categories/impairments found/rehab potential

## 2018-05-19 LAB
ANION GAP SERPL CALC-SCNC: 14 MMOL/L — SIGNIFICANT CHANGE UP (ref 5–17)
BUN SERPL-MCNC: 10 MG/DL — SIGNIFICANT CHANGE UP (ref 8–20)
CALCIUM SERPL-MCNC: 9.7 MG/DL — SIGNIFICANT CHANGE UP (ref 8.6–10.2)
CHLORIDE SERPL-SCNC: 103 MMOL/L — SIGNIFICANT CHANGE UP (ref 98–107)
CO2 SERPL-SCNC: 27 MMOL/L — SIGNIFICANT CHANGE UP (ref 22–29)
CREAT SERPL-MCNC: 0.54 MG/DL — SIGNIFICANT CHANGE UP (ref 0.5–1.3)
GLUCOSE SERPL-MCNC: 98 MG/DL — SIGNIFICANT CHANGE UP (ref 70–115)
HCT VFR BLD CALC: 41.2 % — SIGNIFICANT CHANGE UP (ref 37–47)
HGB BLD-MCNC: 13 G/DL — SIGNIFICANT CHANGE UP (ref 12–16)
MCHC RBC-ENTMCNC: 28.8 PG — SIGNIFICANT CHANGE UP (ref 27–31)
MCHC RBC-ENTMCNC: 31.6 G/DL — LOW (ref 32–36)
MCV RBC AUTO: 91.4 FL — SIGNIFICANT CHANGE UP (ref 81–99)
PLATELET # BLD AUTO: 521 K/UL — HIGH (ref 150–400)
POTASSIUM SERPL-MCNC: 4.6 MMOL/L — SIGNIFICANT CHANGE UP (ref 3.5–5.3)
POTASSIUM SERPL-SCNC: 4.6 MMOL/L — SIGNIFICANT CHANGE UP (ref 3.5–5.3)
RBC # BLD: 4.51 M/UL — SIGNIFICANT CHANGE UP (ref 4.4–5.2)
RBC # FLD: 15.3 % — SIGNIFICANT CHANGE UP (ref 11–15.6)
SODIUM SERPL-SCNC: 144 MMOL/L — SIGNIFICANT CHANGE UP (ref 135–145)
WBC # BLD: 10.3 K/UL — SIGNIFICANT CHANGE UP (ref 4.8–10.8)
WBC # FLD AUTO: 10.3 K/UL — SIGNIFICANT CHANGE UP (ref 4.8–10.8)

## 2018-05-19 PROCEDURE — 70552 MRI BRAIN STEM W/DYE: CPT | Mod: 26

## 2018-05-19 PROCEDURE — 99232 SBSQ HOSP IP/OBS MODERATE 35: CPT

## 2018-05-19 RX ORDER — METOPROLOL TARTRATE 50 MG
37.5 TABLET ORAL EVERY 6 HOURS
Qty: 0 | Refills: 0 | Status: DISCONTINUED | OUTPATIENT
Start: 2018-05-19 | End: 2018-05-20

## 2018-05-19 RX ORDER — ASPIRIN/CALCIUM CARB/MAGNESIUM 324 MG
81 TABLET ORAL DAILY
Qty: 0 | Refills: 0 | Status: DISCONTINUED | OUTPATIENT
Start: 2018-05-19 | End: 2018-05-20

## 2018-05-19 RX ADMIN — Medication 37.5 MILLIGRAM(S): at 13:43

## 2018-05-19 RX ADMIN — ATORVASTATIN CALCIUM 40 MILLIGRAM(S): 80 TABLET, FILM COATED ORAL at 21:56

## 2018-05-19 RX ADMIN — Medication 25 MILLIGRAM(S): at 05:32

## 2018-05-19 RX ADMIN — Medication 25 MILLIGRAM(S): at 00:26

## 2018-05-19 RX ADMIN — PANTOPRAZOLE SODIUM 40 MILLIGRAM(S): 20 TABLET, DELAYED RELEASE ORAL at 05:32

## 2018-05-19 RX ADMIN — RIVAROXABAN 20 MILLIGRAM(S): KIT at 17:27

## 2018-05-19 RX ADMIN — Medication 81 MILLIGRAM(S): at 13:43

## 2018-05-19 NOTE — PROGRESS NOTE ADULT - SUBJECTIVE AND OBJECTIVE BOX
Admitted with chief complaint of Patient is a 63y old  Female who presents with a chief complaint of Acute CVA (18 May 2018 04:28)    Patient seen and examined at bedside, No acute overnight events.  Patient without any complaints.  Eating, voiding, stooling without difficulty.  No fevers noted.  Patient is satisfied with her new diet order.    REVIEW OF SYSTEMS  Denies any chest pain, SOB, weakness, headache, dizziness, constipation, abdominal pain, NVD, fever.    VS: Vital Signs Last 24 Hrs  T(C): 36.1 (19 May 2018 08:00), Max: 36.6 (18 May 2018 16:00)  T(F): 97 (19 May 2018 08:00), Max: 97.8 (18 May 2018 16:00)  HR: 78 (19 May 2018 07:35) (76 - 98)  BP: 120/84 (19 May 2018 07:35) (103/67 - 127/85)  BP(mean): 102 (19 May 2018 05:30) (75 - 102)  RR: 12 (19 May 2018 07:35) (12 - 16)  SpO2: 96% (19 May 2018 07:35) (95% - 98%)    Physical Exam: PHYSICAL EXAM:    GENERAL: Pt lying comfortably, NAD.  HEENT: NC/AT, PERRL, +EOMI.  NECK: soft, Supple, No JVD,   CHEST/LUNG: Clear to auscultation bilaterally; No wheezing.  HEART: Irregular, No murmurs.  ABDOMEN: Soft, Nontender, Nondistended; Bowel sounds present.  MUSCULOSKELETAL: Normal range of motion.  SKIN: No rashes or lesions.  NEURO: AAOX3, power 5/5, sensation intact, speech improving  PSYCH: normal mood.      Labs:                        13.0   10.3  )-----------( 521      ( 19 May 2018 08:33 )             41.2   05-19    144  |  103  |  10.0  ----------------------------<  98  4.6   |  27.0  |  0.54    Ca    9.7      19 May 2018 08:33    TPro  7.8  /  Alb  3.9  /  TBili  0.3<L>  /  DBili  x   /  AST  22  /  ALT  15  /  AlkPhos  109  05-17      Medications:  MEDICATIONS  (STANDING):  aspirin  chewable 81 milliGRAM(s) Oral daily  atorvastatin 40 milliGRAM(s) Oral at bedtime  lisinopril 5 milliGRAM(s) Oral daily  metoprolol tartrate 37.5 milliGRAM(s) Oral every 6 hours  pantoprazole    Tablet 40 milliGRAM(s) Oral before breakfast  rivaroxaban 20 milliGRAM(s) Oral every 24 hours    MEDICATIONS  (PRN):  acetaminophen   Tablet 650 milliGRAM(s) Oral every 6 hours PRN For Temp greater than 38 C (100.4 F)  acetaminophen   Tablet. 650 milliGRAM(s) Oral every 6 hours PRN Mild Pain (1 - 3) Admitted with chief complaint of Patient is a 63y old  Female who presents with a chief complaint of Acute CVA (18 May 2018 04:28)    Patient seen and examined at bedside, No acute overnight events.  Patient without any complaints. Eating, voiding, stooling without difficulty.  No fevers noted.  Patient is satisfied with her new diet order.    REVIEW OF SYSTEMS  Denies any chest pain, SOB, weakness, headache, dizziness, constipation, abdominal pain, NVD, fever.    VS: Vital Signs Last 24 Hrs  T(C): 36.1 (19 May 2018 08:00), Max: 36.6 (18 May 2018 16:00)  T(F): 97 (19 May 2018 08:00), Max: 97.8 (18 May 2018 16:00)  HR: 78 (19 May 2018 07:35) (76 - 98)  BP: 120/84 (19 May 2018 07:35) (103/67 - 127/85)  BP(mean): 102 (19 May 2018 05:30) (75 - 102)  RR: 12 (19 May 2018 07:35) (12 - 16)  SpO2: 96% (19 May 2018 07:35) (95% - 98%)    Physical Exam: PHYSICAL EXAM:    GENERAL: Pt lying comfortably, NAD.  HEENT: NC/AT, PERRL, +EOMI.  NECK: soft, Supple, No JVD,   CHEST/LUNG: Clear to auscultation bilaterally; No wheezing.  HEART: Irregular, No murmurs.  ABDOMEN: Soft, Nontender, Nondistended; Bowel sounds present.  MUSCULOSKELETAL: Normal range of motion.  SKIN: No rashes or lesions.  NEURO: AAOX3, power 5/5, sensation intact, speech improving  PSYCH: normal mood.      Labs:                        13.0   10.3  )-----------( 521      ( 19 May 2018 08:33 )             41.2   05-19    144  |  103  |  10.0  ----------------------------<  98  4.6   |  27.0  |  0.54    Ca    9.7      19 May 2018 08:33    TPro  7.8  /  Alb  3.9  /  TBili  0.3<L>  /  DBili  x   /  AST  22  /  ALT  15  /  AlkPhos  109  05-17      Medications:  MEDICATIONS  (STANDING):  aspirin  chewable 81 milliGRAM(s) Oral daily  atorvastatin 40 milliGRAM(s) Oral at bedtime  lisinopril 5 milliGRAM(s) Oral daily  metoprolol tartrate 37.5 milliGRAM(s) Oral every 6 hours  pantoprazole    Tablet 40 milliGRAM(s) Oral before breakfast  rivaroxaban 20 milliGRAM(s) Oral every 24 hours    MEDICATIONS  (PRN):  acetaminophen   Tablet 650 milliGRAM(s) Oral every 6 hours PRN For Temp greater than 38 C (100.4 F)  acetaminophen   Tablet. 650 milliGRAM(s) Oral every 6 hours PRN Mild Pain (1 - 3)

## 2018-05-19 NOTE — SWALLOW BEDSIDE ASSESSMENT ADULT - ORAL PHASE
Delayed oral transit time/absent bolus ma nipulation and posterior propulsion; bolus suctioned via yankaur by this clinician

## 2018-05-19 NOTE — PROGRESS NOTE ADULT - ASSESSMENT
The patient is a 63y Female who is followed by neurology because of subacute CVA    Likely embolic left MCA infarct  - likely due to afib and no anticoagulation  - may not need asa if no other induications  - on xarelto  - speech therapy    Atrial fibrillation  - continue xarelto  - continue metoprolol    HTN  - maintain normotension  - continue metoprolol/lisinopril    HLD  -  on admission,   - goal LDL<70  - continue lipitor 40 mg daily    Patient is pending disposition  will follow with you    Wilmar Salinas MD PhD   [id number]

## 2018-05-19 NOTE — PROGRESS NOTE ADULT - ATTENDING COMMENTS
I have seen and examined the patient with resident and agree with the above assessment and plan. PM7R and PT note reviewed- PT recommended acute rehab, needs PM7R f/u on Monday.

## 2018-05-19 NOTE — SWALLOW BEDSIDE ASSESSMENT ADULT - ASR SWALLOW ASPIRATION MONITOR
pneumonia/oral hygiene/gurgly voice/change of breathing pattern/position upright (90Y)/fever/throat clearing/cough

## 2018-05-19 NOTE — SWALLOW BEDSIDE ASSESSMENT ADULT - SLP GENERAL OBSERVATIONS
awake, alert, Ox2, +dysarthric (as per pt +dysarthric speech for months)
Pt received awake in bed, nonverbal, nodded head 1X when asked if her name is "dmitri," inconsistent command following, suspect aphasia given preliminary CT results, mildly wet sounding upper airway sounds at baseline

## 2018-05-19 NOTE — SWALLOW BEDSIDE ASSESSMENT ADULT - SWALLOW EVAL: DIAGNOSIS
Oral and pharyngeal stage of swallow judged to be WFL
Severe oral dysphagia. Unable to assess pharyngeal stage 2* no swallow triggered

## 2018-05-19 NOTE — SWALLOW BEDSIDE ASSESSMENT ADULT - SLP PERTINENT HISTORY OF CURRENT PROBLEM
As per cardiology notes: Subacute CVA due to non compliance with coumadin dosing. Pt also reports non compliance with blood work for INR.
Pt admitted with AMS, right sided weakness, found to have large left MCA hyperdense sign on CT. Pt is s/p tPA at 9PM last night with no significant effect

## 2018-05-19 NOTE — PROGRESS NOTE ADULT - SUBJECTIVE AND OBJECTIVE BOX
[facility]                                     [facility] [place]                                 Brad Lincoln, & Hu                                  [address]                                        [place], [place], [address]                                             [phone]        CC: 4 weeks of language difficulties  HPI:The patient is a 63y Female with about 4 weeks of dificulty speaking without significant limb weakness who went to her doctor because she fell recently.  They sent her for outpatient MRI brain.  She was then sent to ED b/c out-patient MRI brain showed stroke.  Currently she has no clear focal weakness, but is aphasic.  There is nothing which aggrevates or alleviates these symptoms.  She has afib, was supposed to be on a/c with coumadin, but is non-compliant with it.  Neuro eval is requested.    Interval history: still mild expressive aphasia, MRI with contrast completed    ROS:  Neuro: aphasia, otherwise no complaints    MEDICATIONS  (STANDING):  aspirin  chewable 81 milliGRAM(s) Oral daily  atorvastatin 40 milliGRAM(s) Oral at bedtime  lisinopril 5 milliGRAM(s) Oral daily  metoprolol tartrate 37.5 milliGRAM(s) Oral every 6 hours  pantoprazole    Tablet 40 milliGRAM(s) Oral before breakfast  rivaroxaban 20 milliGRAM(s) Oral every 24 hours    MEDICATIONS  (PRN):  acetaminophen   Tablet 650 milliGRAM(s) Oral every 6 hours PRN For Temp greater than 38 C (100.4 F)  acetaminophen   Tablet. 650 milliGRAM(s) Oral every 6 hours PRN Mild Pain (1 - 3)    Vital Signs Last 24 Hrs  T(C): 36.1 (19 May 2018 08:00), Max: 36.6 (18 May 2018 16:00)  T(F): 97 (19 May 2018 08:00), Max: 97.8 (18 May 2018 16:00)  HR: 78 (19 May 2018 07:35) (76 - 98)  BP: 120/84 (19 May 2018 07:35) (103/67 - 127/85)  BP(mean): 102 (19 May 2018 05:30) (75 - 102)  RR: 12 (19 May 2018 07:35) (12 - 16)  SpO2: 96% (19 May 2018 07:35) (95% - 98%)    Detailed Neurologic Exam:    Mental status: The patient is awake and alert and has normal attention span.  The patient is fully oriented in 3 spheres. The patient is oriented to current events. She has expressive>receptive aphasia    Cranial nerves: . Pupils equal and react symmetrically to light. There is no visual field deficit to confrontation. Extraocular motion is full with no nystagmus. There is no ptosis. Facial sensation is intact. Facial musculature is symmetric. Palate elevates symmetrically. Shoulder shrug is normal. Tongue is midline.    Motor: There is normal bulk and tone.  There is no tremor.  Strength is 5/5 in the right arm and leg.   Strength is 5/5 in the left arm and leg.    Sensation: Intact to light touch and pin in 4 extremities    Reflexes: 1+ throughout and plantar responses are flexor on left, right toe is equivocal.    Cerebellar: There is no dysmetria on finger to nose testing.    Gait : deferred    LABS:                                    13.0   10.3  )-----------( 521      ( 19 May 2018 08:33 )             41.2     05-19    144  |  103  |  10.0  ----------------------------<  98  4.6   |  27.0  |  0.54    Ca    9.7      19 May 2018 08:33    TPro  7.8  /  Alb  3.9  /  TBili  0.3<L>  /  DBili  x   /  AST  22  /  ALT  15  /  AlkPhos  109  05-17    LIVER FUNCTIONS - ( 17 May 2018 16:44 )  Alb: 3.9 g/dL / Pro: 7.8 g/dL / ALK PHOS: 109 U/L / ALT: 15 U/L / AST: 22 U/L / GGT: x           PT/INR - ( 17 May 2018 16:44 )   PT: 11.7 sec;   INR: 1.06 ratio         PTT - ( 17 May 2018 16:44 )  PTT:31.5 sec    Lipid Profile (05.18.18 @ 08:56)    Total Cholesterol/HDL Ratio Measurement: 5.0 Ratio    Cholesterol, Serum: 204 mg/dL    Triglycerides, Serum: 112 mg/dL    HDL Cholesterol, Serum: 41 mg/dL    Direct LDL: 141: LDL Cholesterol --- Interpretive Comment (for adults 18 and over)  Optimal LDL Level may vary based on clinical situation  Below 70                  Ideal for people at very high risk of heart  disease  Below 100                Ideal for people at risk of heart disease  100 - 129                   [place]  130 - 159                   Borderline high  160 - 189                   High  190 and Above          Very high mg/dL        RADIOLOGY & ADDITIONAL STUDIES (independently reviewed unless otherwise noted):  Recent neurological studies:  MRI brain with contrast:  There is enhancement of gyri in left MCA territory, no mass or bleed.  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  Low suspicion for encephalitis.    Previous neurological studies:  MRI brain shows faint diffusion in left MCA territory, on the left with FLAIR positivity in the cortex  (laminar necrosis), no acute heme no mass  MRA head stenosis in left M1, no aneurysm or avm  MRA neck- no sig stenoses    TTE Echo Complete w/Doppler (05.17.18 @ 19:53)  Summary:   1. Left ventricular ejection fraction, by visual estimation, is 60 to   65%.   2. Normal global left ventricular systolic function.   3. Normal left ventricular internal cavity size.   4. The mitral in-flow pattern reveals no discernable A-wave, therefore   no comment on diastolic function can be made.   5. Normal right ventricular size and function.   6. The right atrium is normal in size.   7. There is no evidence of pericardial effusion.   8. Mild mitral annular calcification.   9. Thickening and calcification of the anterior and posterior mitral   valve leaflets.  10. Mild-moderate tricuspid regurgitation.  11. Structurally normal tricuspid valve, with normal leaflet excursion.  12. Mild aortic regurgitation.  13. Structurally normal pulmonic valve, with normal leaflet excursion.  14. The main pulmonary artery is normal in size.  15. The pulmonary veins appear normal.

## 2018-05-19 NOTE — PROGRESS NOTE ADULT - ASSESSMENT
Pt is 64 y/o female with PMHx of HTN, HLD, Afib on coumadin (Ran out coumadin 2 months ago), GERD, MVP,  OA, suspected CHAYA, was having some speech difficulty for last 2-3 weeks, had outpatient MRI which showed subacute CVA and minimal subcortical blood and she was sent to ED. In ED /100-->179/85, CTH shows acute /subacute frontal infarcts, was code stroke in ED. Admitted to stroke unit for Subacute /Acute CVA.  Here MRI head with subacute CVA, no hemorrhage, MRA brain /Neck with no significant stenosis, TTE with normal EF. Patient went for MRI with IV contrast today and tolerated procedure well.  Evaluated by cardiology /neurology and recommendation appreciated.   Patient medically optimized for discharge however PT recommending acute rehab.  Acute Rehab not available on weekends. Will get PT reevaluation today for possible discharge home.  If still requiring replacement, will downgrade.    Acute /Subacute CVA:  - Likely embolic from underlying Afib with coumadin non-compliant.  - MRI head ruled out hemorrhage. Images /TTE as above. Vasogenic edema noted. MRI with IV showed Gyriform enhancement involves the left frontal lobe and left insular ribbon. Differential diagnosis includes subacute infarct and  encephalitis. No underlying mass visualized.  - Completed stroke up work.  - C/w Lipitor 40mg QD, resume AC per neurology-Patient started on xarelto and tolerating well.  ASA 81mg added per cardiology for primary prevention.  - HBa1c 5.1, .  - PM&R eval noted, pending PT eval    >H/o AFib- Ran out coumadin 2 months ago. Resume AC per neuro, cardiology recommended switch coumadin to NOACs- pt ok with Xarelto- covered by insurance and started, Metoprolol 37.5mg Q6 (home dose 200 mg QD)  >H/o HTN- Lisinopril 5mg QD and Metoprolol 37.5mg Q6  >H/o HLD- Lipitor 40mg QD  >DVT ppx- VCD boots and xarelto Pt is 62 y/o female with PMHx of HTN, HLD, Afib on coumadin (Ran out coumadin 2 months ago), GERD, MVP, OA, suspected CHAYA, was having some speech difficulty for last 2-3 weeks, had outpatient MRI which showed subacute CVA and minimal subcortical blood and she was sent to ED. In ED /100-->179/85, CTH shows acute /subacute frontal infarcts, was code stroke in ED. Admitted to stroke unit for Subacute /Acute CVA. Here MRI head with subacute CVA, no hemorrhage, MRI with OV contrast ruled out any mass, MRA brain /Neck with no significant stenosis, TTE with normal EF. Evaluated by cardiology /neurology and recommendation appreciated. AC resumed with xarelto per cardiology (Pt was on couamdin at home- switched here). Patient medically optimized for discharge however PT recommending acute rehab.  Acute Rehab not available on weekends. Will get PT re-evaluation today and PM7R f/u on Monday.    Acute /Subacute CVA:  - Likely embolic from underlying Afib with coumadin non-compliant.  - MRI head / MRA head /neck, TTE as above.   - C/w Lipitor 40mg QD, resume AC per neurology- Home coumadin switched to Xarelto and tolerating well.  ASA 81mg added per cardiology for primary prevention.  - HBa1c 5.1, .  - PM&R eval noted, PT recommended acute rehab. Need PM&R f/u on Monday.    >H/o AFib- Ran out coumadin 2 months ago. Resume AC per neuro, cardiology recommended switch coumadin to NOACs- pt ok with Xarelto- covered by insurance and started, Metoprolol 37.5mg Q6 (home dose 200 mg QD)  >H/o HTN- Lisinopril 5mg QD and Metoprolol 37.5mg Q6  >H/o HLD- Lipitor 40mg QD  >DVT ppx- VCD boots and Xarelto

## 2018-05-19 NOTE — PROGRESS NOTE ADULT - SUBJECTIVE AND OBJECTIVE BOX
[facility], [facility]                                          [address], [place] [address]                                               PHONE: [phone]    FAX: [phone] [address], [address], [place] [address]                                       PHONE: [phone]    FAX: [phone]  *******************************************************************************    Overnight events/Subjective Assessment: No acute complaints.  No CP, SOB, palp    INTERPRETATION OF TELEMETRY (personally reviewed): AF , up to 120s briefly    No Known Allergies    MEDICATIONS  (STANDING):  atorvastatin 40 milliGRAM(s) Oral at bedtime  lisinopril 5 milliGRAM(s) Oral daily  metoprolol tartrate 25 milliGRAM(s) Oral every 6 hours  pantoprazole    Tablet 40 milliGRAM(s) Oral before breakfast  rivaroxaban 20 milliGRAM(s) Oral every 24 hours    MEDICATIONS  (PRN):  acetaminophen   Tablet 650 milliGRAM(s) Oral every 6 hours PRN For Temp greater than 38 C (100.4 F)  acetaminophen   Tablet. 650 milliGRAM(s) Oral every 6 hours PRN Mild Pain (1 - 3)      Vital Signs Last 24 Hrs  T(C): 36.1 (19 May 2018 08:00), Max: 36.9 (18 May 2018 11:54)  T(F): 97 (19 May 2018 08:00), Max: 98.4 (18 May 2018 11:54)  HR: 78 (19 May 2018 07:35) (76 - 98)  BP: 120/84 (19 May 2018 07:35) (103/67 - 151/83)  BP(mean): 102 (19 May 2018 05:30) (75 - 102)  RR: 12 (19 May 2018 07:35) (12 - 18)  SpO2: 96% (19 May 2018 07:35) (95% - 98%)    I&O's Detail    18 May 2018 07:01  -  19 May 2018 07:00  --------------------------------------------------------  IN:    Oral Fluid: 960 mL  Total IN: 960 mL    OUT:    Voided: 625 mL  Total OUT: 625 mL    Total NET: 335 mL        I&O's Summary    18 May 2018 07:01  -  19 May 2018 07:00  --------------------------------------------------------  IN: 960 mL / OUT: 625 mL / NET: 335 mL            PHYSICAL EXAM:  General: Appears well developed, well nourished, no acute distress  HEENT: Head: normocephalic, atraumatic  Eyes: Pupils equal and reactive  Neck: Supple, no carotid bruit, no JVD, no HJR  CARDIOVASCULAR: Normal S1 and S2, no murmur, rub, or gallop  LUNGS: Clear to auscultation bilaterally, no rales, rhonchi or wheeze  ABDOMEN: Soft, nontender, non-distended, positive bowel sounds, no mass or bruit  EXTREMITIES: No edema, distal pulses WNL  SKIN: Warm and dry with normal turgor  NEURO: Alert & oriented x 3, grossly intact, slight aphasia  PSYCH: normal mood and affect        LABS:                        13.0   10.3  )-----------( 521      ( 19 May 2018 08:33 )             41.2     05-19    144  |  103  |  10.0  ----------------------------<  98  4.6   |  27.0  |  0.54    Ca    9.7      19 May 2018 08:33    TPro  7.8  /  Alb  3.9  /  TBili  0.3<L>  /  DBili  x   /  AST  22  /  ALT  15  /  AlkPhos  109  05-17        PT/INR - ( 17 May 2018 16:44 )   PT: 11.7 sec;   INR: 1.06 ratio         PTT - ( 17 May 2018 16:44 )  PTT:31.5 sec  serum  Lipids:   Hemoglobin A1C, Whole Blood: 5.1 % (05-17 @ 16:44)        RADIOLOGY & ADDITIONAL STUDIES:  MRA neck: < from: MR Angio Neck No Cont (05.17.18 @ 21:51) >  IMPRESSION:         Limited study. No high-grade stenosis suspected. Consider postcontrast MR   images. Preliminary report provided by NNAMDI LOGAN M.D.;[facility] RADIOLOGIST     < end of copied text >    MRA head: < from: MR Angio Head No Cont (05.17.18 @ 21:51) >  IMPRESSION:           Mild focal stenosis in proximal M1 segment of left MCA seen on series 2   image 111. No evidence of occlusion. Preliminary report provided by NNAMDI LOGAN M.D.;[facility] RADIOLOGIST     < end of copied text >    NCHCT: < from: CT Head No Cont (05.17.18 @ 16:04) >  IMPRESSION:      Subacute/acute left frontal lobe moderate size infarct.   MRI is recommended for further evaluation. Findings discussed with Dr. Tinoco.      ECHO:< from: TTE Echo Complete w/Doppler (05.17.18 @ 19:53) >  PHYSICIAN INTERPRETATION:  Left Ventricle: The left ventricular internal cavity size is normal. Left   ventricular wall thickness is normal.  Global LV systolic function was normal. Left ventricular ejection   fraction, by visual estimation, is 60 to 65%. Normal segmental left   ventricular systolic function. The mitral in-flow pattern reveals no   discernable A-wave, therefore no comment on diastolic function can be   made.  Right Ventricle: Normal right ventricular size and function. TV S' 0.1   m/s.  Left Atrium: Mildly enlarged left atrium.  Right Atrium: The right atrium is normal in size.  Pericardium: There is no evidence of pericardial effusion.  Mitral Valve: Thickening and calcification of the anterior and posterior   mitral valve leaflets. There is mild mitral annular calcification. Trace   mitral valve regurgitation is seen.  Tricuspid Valve: Structurally normal tricuspid valve, with normal leaflet   excursion. Mild-moderate tricuspid regurgitation is visualized.  Aortic Valve: The aortic valve is trileaflet. Mild aortic valve   regurgitation is seen.  Pulmonic Valve: Structurally normal pulmonic valve, with normal leaflet   excursion. Trace pulmonic valve regurgitation.  Aorta: The aortic root and ascending aorta are structurally normal, with   no evidence of dilitation.  Pulmonary Artery: The main pulmonary artery is normal in size.  Venous: The pulmonary veins appear normal. The inferior vena cava was   normal sized, with respiratory size variation greater than 50%.       Summary:   1. Left ventricular ejection fraction, by visual estimation, is 60 to   65%.   2. Normal global left ventricular systolic function.   3. Normal left ventricular internal cavity size.   4. The mitral in-flow pattern reveals no discernable A-wave, therefore   no comment on diastolic function can be made.   5. Normal right ventricular size and function.   6. The right atrium is normal in size.   7. There is no evidence of pericardial effusion.   8. Mild mitral annular calcification.   9. Thickening and calcification of the anterior and posterior mitral   valve leaflets.  10. Mild-moderate tricuspid regurgitation.  11. Structurally normal tricuspid valve, with normal leaflet excursion.  12. Mild aortic regurgitation.  13. Structurally normal pulmonic valve, with normal leaflet excursion.  14. The main pulmonary artery is normal in size.  15. The pulmonary veins appear normal.    < end of copied text >      ASSESSMENT AND PLAN:  MALU ARENAS is a 63y Female with past medical history significant for HTN, MVP, HL, COPD, chronic AF a/w subacute CVA.  She ran out of her coumadin 2 months ago and also reports poor follow up with INRs.      Holter 1/8/18  AF avg HR 84. QL=428  Echo 1/10/18 EF 50-55%, borderline LVH. LAE, Tr MR/TR  Carotid 1/10/18 16-49% right ICA and 1-15% left ICA stenosis  Persantine stress 1/8/18 no ischemia  EF=62%    - Coumadin is not the ideal AC agent for her given poor compliance with medication and INR checks.  Xarelto has been started.  The risks/benefits/alternatives have been discussed with the patient.  She is aware of the increased risk of bleeding while on AC and also of the current lack of reversal agent for Xarelto.  She agrees to proceed.  - Chronic persistent AF. Rate is controlled.  Pt was on metoprolol ER 200mg as outpt. BP's noted.  Increase to Lopressor 37.5mg q6h  - atorvastatin 40mg daily  - lisinopril 5mg daily  - ASA discontinued.  Would restart, but will defer to neuro  - MRA completed.  Awaiting results  - Echo 5/17/18 noted above.  EF 60-65%, mild AI, mild-mod TR  - Importance of medication compliance dw pt      Clifford Clemente MD

## 2018-05-20 VITALS
RESPIRATION RATE: 18 BRPM | DIASTOLIC BLOOD PRESSURE: 78 MMHG | HEART RATE: 81 BPM | TEMPERATURE: 98 F | OXYGEN SATURATION: 94 % | SYSTOLIC BLOOD PRESSURE: 118 MMHG

## 2018-05-20 PROCEDURE — 81001 URINALYSIS AUTO W/SCOPE: CPT

## 2018-05-20 PROCEDURE — 80053 COMPREHEN METABOLIC PANEL: CPT

## 2018-05-20 PROCEDURE — 83036 HEMOGLOBIN GLYCOSYLATED A1C: CPT

## 2018-05-20 PROCEDURE — 85027 COMPLETE CBC AUTOMATED: CPT

## 2018-05-20 PROCEDURE — 92610 EVALUATE SWALLOWING FUNCTION: CPT

## 2018-05-20 PROCEDURE — 97530 THERAPEUTIC ACTIVITIES: CPT

## 2018-05-20 PROCEDURE — 85730 THROMBOPLASTIN TIME PARTIAL: CPT

## 2018-05-20 PROCEDURE — 36415 COLL VENOUS BLD VENIPUNCTURE: CPT

## 2018-05-20 PROCEDURE — 70450 CT HEAD/BRAIN W/O DYE: CPT

## 2018-05-20 PROCEDURE — 99239 HOSP IP/OBS DSCHRG MGMT >30: CPT

## 2018-05-20 PROCEDURE — 93306 TTE W/DOPPLER COMPLETE: CPT

## 2018-05-20 PROCEDURE — 93005 ELECTROCARDIOGRAM TRACING: CPT

## 2018-05-20 PROCEDURE — 80061 LIPID PANEL: CPT

## 2018-05-20 PROCEDURE — 99285 EMERGENCY DEPT VISIT HI MDM: CPT

## 2018-05-20 PROCEDURE — 70551 MRI BRAIN STEM W/O DYE: CPT

## 2018-05-20 PROCEDURE — 70544 MR ANGIOGRAPHY HEAD W/O DYE: CPT

## 2018-05-20 PROCEDURE — 80048 BASIC METABOLIC PNL TOTAL CA: CPT

## 2018-05-20 PROCEDURE — 85610 PROTHROMBIN TIME: CPT

## 2018-05-20 PROCEDURE — 70552 MRI BRAIN STEM W/DYE: CPT

## 2018-05-20 PROCEDURE — 97116 GAIT TRAINING THERAPY: CPT

## 2018-05-20 PROCEDURE — 70547 MR ANGIOGRAPHY NECK W/O DYE: CPT

## 2018-05-20 PROCEDURE — 97163 PT EVAL HIGH COMPLEX 45 MIN: CPT

## 2018-05-20 RX ORDER — ATORVASTATIN CALCIUM 80 MG/1
1 TABLET, FILM COATED ORAL
Qty: 0 | Refills: 0 | COMMUNITY

## 2018-05-20 RX ORDER — ATORVASTATIN CALCIUM 80 MG/1
1 TABLET, FILM COATED ORAL
Qty: 30 | Refills: 0 | OUTPATIENT
Start: 2018-05-20 | End: 2018-06-18

## 2018-05-20 RX ORDER — ASPIRIN/CALCIUM CARB/MAGNESIUM 324 MG
1 TABLET ORAL
Qty: 30 | Refills: 0 | OUTPATIENT
Start: 2018-05-20 | End: 2018-06-18

## 2018-05-20 RX ORDER — LISINOPRIL 2.5 MG/1
1 TABLET ORAL
Qty: 30 | Refills: 0 | OUTPATIENT
Start: 2018-05-20 | End: 2018-06-18

## 2018-05-20 RX ORDER — RIVAROXABAN 15 MG-20MG
1 KIT ORAL
Qty: 30 | Refills: 0 | OUTPATIENT
Start: 2018-05-20 | End: 2018-06-18

## 2018-05-20 RX ORDER — WARFARIN SODIUM 2.5 MG/1
1 TABLET ORAL
Qty: 0 | Refills: 0 | COMMUNITY

## 2018-05-20 RX ADMIN — PANTOPRAZOLE SODIUM 40 MILLIGRAM(S): 20 TABLET, DELAYED RELEASE ORAL at 05:18

## 2018-05-20 RX ADMIN — Medication 37.5 MILLIGRAM(S): at 00:39

## 2018-05-20 RX ADMIN — LISINOPRIL 5 MILLIGRAM(S): 2.5 TABLET ORAL at 05:17

## 2018-05-20 RX ADMIN — Medication 81 MILLIGRAM(S): at 12:08

## 2018-05-20 RX ADMIN — Medication 37.5 MILLIGRAM(S): at 05:17

## 2018-05-20 RX ADMIN — Medication 37.5 MILLIGRAM(S): at 12:08

## 2018-05-20 RX ADMIN — RIVAROXABAN 20 MILLIGRAM(S): KIT at 18:02

## 2018-05-20 RX ADMIN — Medication 37.5 MILLIGRAM(S): at 18:02

## 2018-05-20 NOTE — PROGRESS NOTE ADULT - SUBJECTIVE AND OBJECTIVE BOX
[facility], [facility]                                          [address], [address], [place] [address]                                               PHONE: [phone]    FAX: [phone] [address], [address], [place] [address]                                       PHONE: [phone]    FAX: [phone]  *******************************************************************************    Overnight events/Subjective Assessment: No CP, palpitations, SOB    No Known Allergies    MEDICATIONS  (STANDING):  aspirin  chewable 81 milliGRAM(s) Oral daily  atorvastatin 40 milliGRAM(s) Oral at bedtime  lisinopril 5 milliGRAM(s) Oral daily  metoprolol tartrate 37.5 milliGRAM(s) Oral every 6 hours  pantoprazole    Tablet 40 milliGRAM(s) Oral before breakfast  rivaroxaban 20 milliGRAM(s) Oral every 24 hours    MEDICATIONS  (PRN):  acetaminophen   Tablet 650 milliGRAM(s) Oral every 6 hours PRN For Temp greater than 38 C (100.4 F)  acetaminophen   Tablet. 650 milliGRAM(s) Oral every 6 hours PRN Mild Pain (1 - 3)      Vital Signs Last 24 Hrs  T(C): 36.9 (20 May 2018 08:06), Max: 36.9 (20 May 2018 08:06)  T(F): 98.5 (20 May 2018 08:06), Max: 98.5 (20 May 2018 08:06)  HR: 64 (20 May 2018 08:06) (64 - 91)  BP: 122/66 (20 May 2018 08:31) (99/77 - 132/70)  BP(mean): --  RR: 17 (20 May 2018 08:06) (12 - 17)  SpO2: 97% (20 May 2018 08:06) (95% - 97%)    I&O's Detail    19 May 2018 07:01  -  20 May 2018 07:00  --------------------------------------------------------  IN:    Oral Fluid: 600 mL  Total IN: 600 mL    OUT:    Voided: 1 mL  Total OUT: 1 mL    Total NET: 599 mL        I&O's Summary    19 May 2018 07:01  -  20 May 2018 07:00  --------------------------------------------------------  IN: 600 mL / OUT: 1 mL / NET: 599 mL            PHYSICAL EXAM:  General: Appears well developed, well nourished, no acute distress  HEENT: Head: normocephalic, atraumatic  Eyes: Pupils equal and reactive  Neck: Supple, no carotid bruit, no JVD, no HJR  CARDIOVASCULAR: Irreg irregNormal S1 and S2, no murmur, rub, or gallop  LUNGS: Clear to auscultation bilaterally, no rales, rhonchi or wheeze  ABDOMEN: Soft, nontender, non-distended, positive bowel sounds, no mass or bruit  EXTREMITIES: No edema, distal pulses WNL  SKIN: Warm and dry with normal turgor  NEURO: Alert & oriented x 3, grossly intact, slight aphasia?  PSYCH: normal mood and affect          LABS:                        13.0   10.3  )-----------( 521      ( 19 May 2018 08:33 )             41.2     05-19    144  |  103  |  10.0  ----------------------------<  98  4.6   |  27.0  |  0.54    Ca    9.7      19 May 2018 08:33            serum  Lipids:   Hemoglobin A1C, Whole Blood: 5.1 % (05-17 @ 16:44)        RADIOLOGY & ADDITIONAL STUDIES:  MRA neck: < from: MR Angio Neck No Cont (05.17.18 @ 21:51) >  IMPRESSION:         Limited study. No high-grade stenosis suspected. Consider postcontrast MR   images. Preliminary report provided by NNAMDI LOGAN M.D.;OS[facility]DO[facility] RADIOLOGIST     < end of copied text >    MRA head: < from: MR Angio Head No Cont (05.17.18 @ 21:51) >  IMPRESSION:           Mild focal stenosis in proximal M1 segment of left MCA seen on series 2   image 111. No evidence of occlusion. Preliminary report provided by NNAMDI LOGAN M.D.;VRAD RADIOLOGIST     < end of copied text >    NCHCT: < from: CT Head No Cont (05.17.18 @ 16:04) >  IMPRESSION:      Subacute/acute left frontal lobe moderate size infarct.   MRI is recommended for further evaluation. Findings discussed with Dr. Tinoco.      ECHO:< from: TTE Echo Complete w/Doppler (05.17.18 @ 19:53) >  PHYSICIAN INTERPRETATION:  Left Ventricle: The left ventricular internal cavity size is normal. Left   ventricular wall thickness is normal.  Global LV systolic function was normal. Left ventricular ejection   fraction, by visual estimation, is 60 to 65%. Normal segmental left   ventricular systolic function. The mitral in-flow pattern reveals no   discernable A-wave, therefore no comment on diastolic function can be   made.  Right Ventricle: Normal right ventricular size and function. TV S' 0.1   m/s.  Left Atrium: Mildly enlarged left atrium.  Right Atrium: The right atrium is normal in size.  Pericardium: There is no evidence of pericardial effusion.  Mitral Valve: Thickening and calcification of the anterior and posterior   mitral valve leaflets. There is mild mitral annular calcification. Trace   mitral valve regurgitation is seen.  Tricuspid Valve: Structurally normal tricuspid valve, with normal leaflet   excursion. Mild-moderate tricuspid regurgitation is visualized.  Aortic Valve: The aortic valve is trileaflet. Mild aortic valve   regurgitation is seen.  Pulmonic Valve: Structurally normal pulmonic valve, with normal leaflet   excursion. Trace pulmonic valve regurgitation.  Aorta: The aortic root and ascending aorta are structurally normal, with   no evidence of dilitation.  Pulmonary Artery: The main pulmonary artery is normal in size.  Venous: The pulmonary veins appear normal. The inferior vena cava was   normal sized, with respiratory size variation greater than 50%.       Summary:   1. Left ventricular ejection fraction, by visual estimation, is 60 to   65%.   2. Normal global left ventricular systolic function.   3. Normal left ventricular internal cavity size.   4. The mitral in-flow pattern reveals no discernable A-wave, therefore   no comment on diastolic function can be made.   5. Normal right ventricular size and function.   6. The right atrium is normal in size.   7. There is no evidence of pericardial effusion.   8. Mild mitral annular calcification.   9. Thickening and calcification of the anterior and posterior mitral   valve leaflets.  10. Mild-moderate tricuspid regurgitation.  11. Structurally normal tricuspid valve, with normal leaflet excursion.  12. Mild aortic regurgitation.  13. Structurally normal pulmonic valve, with normal leaflet excursion.  14. The main pulmonary artery is normal in size.  15. The pulmonary veins appear normal.      ASSESSMENT AND PLAN:  MALU ARENAS is a 63y Female with past medical history significant for HTN, MVP, HL, COPD, chronic AF a/w subacute CVA.  She ran out of her coumadin 2 months ago and also reports poor follow up with INRs.      Holter 1/8/18  AF avg HR 84. NZ=589  Echo 1/10/18 EF 50-55%, borderline LVH. LAE, Tr MR/TR  Carotid 1/10/18 16-49% right ICA and 1-15% left ICA stenosis  Persantine stress 1/8/18 no ischemia  EF=62%    - Coumadin is not the ideal AC agent for her given poor compliance with medication and INR checks.  Xarelto has been started.  The risks/benefits/alternatives have been discussed with the patient.  She is aware of the increased risk of bleeding while on AC and also of the current lack of reversal agent for Xarelto.  She agrees to proceed.  - Chronic persistent AF. Rate is controlled.  Pt was on metoprolol ER 200mg as outpt. BP's noted.  Would resume home dose at DC  - atorvastatin 40mg daily  - lisinopril 5mg daily  - ASA 81mg daily reastarted.  She does have mild-mod right ICA stenosis and mild LICA stenosis by carotid duplex in 1/2018.  - Echo 5/17/18 noted above.  EF 60-65%, mild AI, mild-mod TR  - Importance of medication compliance dw pt  - Anticipate DC today.  She will need follow up in our office for general cardiology care and also for EP arrhythmia management. She will call for appointments.      Clifford Clemente MD

## 2018-06-05 ENCOUNTER — APPOINTMENT (OUTPATIENT)
Dept: NEUROLOGY | Facility: CLINIC | Age: 63
End: 2018-06-05
Payer: MEDICARE

## 2018-06-05 VITALS
HEART RATE: 71 BPM | WEIGHT: 225 LBS | SYSTOLIC BLOOD PRESSURE: 128 MMHG | DIASTOLIC BLOOD PRESSURE: 70 MMHG | BODY MASS INDEX: 41.41 KG/M2 | HEIGHT: 62 IN

## 2018-06-05 DIAGNOSIS — I63.512 CEREBRAL INFARCTION DUE TO UNSPECIFIED OCCLUSION OR STENOSIS OF LEFT MIDDLE CEREBRAL ARTERY: ICD-10-CM

## 2018-06-05 PROCEDURE — 99213 OFFICE O/P EST LOW 20 MIN: CPT

## 2018-06-18 ENCOUNTER — APPOINTMENT (OUTPATIENT)
Dept: PULMONOLOGY | Facility: CLINIC | Age: 63
End: 2018-06-18
Payer: MEDICARE

## 2018-06-18 VITALS
BODY MASS INDEX: 40.24 KG/M2 | WEIGHT: 220 LBS | HEART RATE: 54 BPM | OXYGEN SATURATION: 94 % | SYSTOLIC BLOOD PRESSURE: 112 MMHG | DIASTOLIC BLOOD PRESSURE: 72 MMHG

## 2018-06-18 DIAGNOSIS — K21.0 GASTRO-ESOPHAGEAL REFLUX DISEASE WITH ESOPHAGITIS: ICD-10-CM

## 2018-06-18 DIAGNOSIS — G47.33 OBSTRUCTIVE SLEEP APNEA (ADULT) (PEDIATRIC): ICD-10-CM

## 2018-06-18 PROCEDURE — 99214 OFFICE O/P EST MOD 30 MIN: CPT

## 2018-06-18 RX ORDER — WARFARIN 5 MG/1
5 TABLET ORAL
Refills: 0 | Status: DISCONTINUED | COMMUNITY
End: 2018-06-18

## 2018-06-18 RX ORDER — RIVAROXABAN 20 MG/1
20 TABLET, FILM COATED ORAL
Refills: 0 | Status: ACTIVE | COMMUNITY

## 2018-08-17 PROBLEM — I48.91 UNSPECIFIED ATRIAL FIBRILLATION: Chronic | Status: ACTIVE | Noted: 2017-04-10

## 2018-08-17 PROBLEM — K21.9 GASTRO-ESOPHAGEAL REFLUX DISEASE WITHOUT ESOPHAGITIS: Chronic | Status: ACTIVE | Noted: 2017-04-10

## 2018-08-17 PROBLEM — G47.30 SLEEP APNEA, UNSPECIFIED: Chronic | Status: ACTIVE | Noted: 2017-04-10

## 2018-08-22 ENCOUNTER — APPOINTMENT (OUTPATIENT)
Dept: ORTHOPEDIC SURGERY | Facility: CLINIC | Age: 63
End: 2018-08-22

## 2018-09-07 ENCOUNTER — APPOINTMENT (OUTPATIENT)
Dept: ORTHOPEDIC SURGERY | Facility: CLINIC | Age: 63
End: 2018-09-07

## 2018-09-11 ENCOUNTER — APPOINTMENT (OUTPATIENT)
Dept: NEUROLOGY | Facility: CLINIC | Age: 63
End: 2018-09-11

## 2018-11-16 ENCOUNTER — APPOINTMENT (OUTPATIENT)
Dept: ORTHOPEDIC SURGERY | Facility: CLINIC | Age: 63
End: 2018-11-16
Payer: MEDICARE

## 2018-11-16 VITALS
SYSTOLIC BLOOD PRESSURE: 116 MMHG | WEIGHT: 220 LBS | BODY MASS INDEX: 40.48 KG/M2 | DIASTOLIC BLOOD PRESSURE: 71 MMHG | HEIGHT: 62 IN | HEART RATE: 123 BPM

## 2018-11-16 PROCEDURE — 73562 X-RAY EXAM OF KNEE 3: CPT | Mod: 50

## 2018-11-16 PROCEDURE — 99214 OFFICE O/P EST MOD 30 MIN: CPT | Mod: 25

## 2018-11-16 PROCEDURE — 20610 DRAIN/INJ JOINT/BURSA W/O US: CPT | Mod: 50

## 2018-11-27 ENCOUNTER — OUTPATIENT (OUTPATIENT)
Dept: OUTPATIENT SERVICES | Facility: HOSPITAL | Age: 63
LOS: 1 days | End: 2018-11-27
Payer: MEDICARE

## 2018-11-27 DIAGNOSIS — Z51.89 ENCOUNTER FOR OTHER SPECIFIED AFTERCARE: ICD-10-CM

## 2018-11-27 DIAGNOSIS — Z90.81 ACQUIRED ABSENCE OF SPLEEN: Chronic | ICD-10-CM

## 2018-11-27 DIAGNOSIS — Z87.09 PERSONAL HISTORY OF OTHER DISEASES OF THE RESPIRATORY SYSTEM: Chronic | ICD-10-CM

## 2018-11-27 DIAGNOSIS — Z96.659 PRESENCE OF UNSPECIFIED ARTIFICIAL KNEE JOINT: Chronic | ICD-10-CM

## 2018-11-27 DIAGNOSIS — K43.9 VENTRAL HERNIA WITHOUT OBSTRUCTION OR GANGRENE: Chronic | ICD-10-CM

## 2018-11-28 DIAGNOSIS — M17.12 UNILATERAL PRIMARY OSTEOARTHRITIS, LEFT KNEE: ICD-10-CM

## 2018-11-28 DIAGNOSIS — M17.11 UNILATERAL PRIMARY OSTEOARTHRITIS, RIGHT KNEE: ICD-10-CM

## 2018-12-03 DIAGNOSIS — I69.320 APHASIA FOLLOWING CEREBRAL INFARCTION: ICD-10-CM

## 2018-12-03 DIAGNOSIS — I69.322 DYSARTHRIA FOLLOWING CEREBRAL INFARCTION: ICD-10-CM

## 2018-12-03 DIAGNOSIS — I63.9 CEREBRAL INFARCTION, UNSPECIFIED: ICD-10-CM

## 2019-01-15 PROCEDURE — G8979: CPT | Mod: CL

## 2019-01-15 PROCEDURE — 97112 NEUROMUSCULAR REEDUCATION: CPT

## 2019-01-15 PROCEDURE — G9163: CPT | Mod: CI

## 2019-01-15 PROCEDURE — 97163 PT EVAL HIGH COMPLEX 45 MIN: CPT

## 2019-01-15 PROCEDURE — G8978: CPT | Mod: CM

## 2019-01-15 PROCEDURE — G9162: CPT | Mod: CJ

## 2019-01-15 PROCEDURE — 97116 GAIT TRAINING THERAPY: CPT

## 2019-01-15 PROCEDURE — 97110 THERAPEUTIC EXERCISES: CPT

## 2019-01-15 PROCEDURE — 92507 TX SP LANG VOICE COMM INDIV: CPT

## 2019-01-15 PROCEDURE — 92523 SPEECH SOUND LANG COMPREHEN: CPT

## 2019-04-12 ENCOUNTER — APPOINTMENT (OUTPATIENT)
Dept: ORTHOPEDIC SURGERY | Facility: CLINIC | Age: 64
End: 2019-04-12
Payer: MEDICARE

## 2019-04-12 VITALS — BODY MASS INDEX: 40.48 KG/M2 | WEIGHT: 220 LBS | HEIGHT: 62 IN

## 2019-04-12 PROCEDURE — 20610 DRAIN/INJ JOINT/BURSA W/O US: CPT | Mod: 50

## 2019-04-12 PROCEDURE — 99213 OFFICE O/P EST LOW 20 MIN: CPT | Mod: 25

## 2019-04-12 NOTE — REVIEW OF SYSTEMS
[Joint Pain] : joint pain [Joint Stiffness] : joint stiffness [Joint Swelling] : joint swelling [Negative] : Endocrine [[id number]] : b/l knee

## 2019-04-12 NOTE — PROCEDURE
[de-identified] : I injected the patient's bilateral knees with cortisone today.\par \par I discussed at length with the patient the planned steroid and lidocaine injection. The risks, benefits, convalescence and alternatives were reviewed. The possible side effects discussed included but were not limited to: pain, swelling, heat, bleeding, and redness. Symptoms are generally mild but if they are extensive then contact the office. Giving pain relievers by mouth such as NSAIDs or Tylenol can generally treat the reactions to steroid and lidocaine. Rare cases of infection have been noted. Rash, hives and itching may occur post injection. If you have muscle pain or cramps, flushing and or swelling of the face, rapid heart beat, nausea, dizziness, fever, chills, headache, difficulty breathing, swelling in the arms or legs, or have a prickly feeling of your skin, contact a health care provider immediately. Following this discussion, the knee was prepped with Alcohol and under sterile condition the 80 mg Depo-Medrol and 6 cc Lidocaine injection was performed with a 20 gauge needle through a superolateral injection site. The needle was introduced into the joint, aspiration was performed to ensure intra-articular placement and the medication was injected. Upon withdrawal of the needle the site was cleaned with alcohol and a band aid applied. The patient tolerated the injection well and there were no adverse effects. Post injection instructions included no strenuous activity for 24 hours, cryotherapy and if there are any adverse effects to contact the office.

## 2019-04-12 NOTE — DISCUSSION/SUMMARY
[de-identified] : 63 year old female presents with severe bilateral bone on bone lateral compartment with retained hardware in her right tibia. I encouraged the patient to continue ambulating with a cane or walker as much as she can. The patient elected for bilateral cortisone injections, which she received and tolerated well. The patient is not a candidate for total knee replacements at this time as she is nonambulatory. I think pursuing bariatric surgery and PT before a knee replacement will lower the risk for postop infection. I prescribed physical therapy for the patient. The patient will follow up in 3 months for re-evaluation and possible repeat cortisone injections.\par \par A knee replacement means resurfacing of all 3 surfaces of the patella, the femur, and tibia with metal and plastic parts. The prosthetic parts are usually cemented into position and well outpatient range of motion from full extension to about 120° of flexion. The postoperative motion, however, is determined by multiple factors, the most important of which is preoperative motion. In general, the better motion preoperatively, the better the motion postoperatively. The operation, pending medical clearance, gently requires hospitalization of 3-4 days for one knee, 5-7 days for bilateral knee replacements. In general, we prefer to perform the procedure under spinal anesthesia with femoral nerve block and occasional single shot sciatic nerve block. We may ask a patient to give 2 units of blood for bilateral total knee arthroplasties, for one knee we institute a normogram which may include administration of preoperative Procrit. The operative procedure takes probably 1-2 hours. The operation requires a straight incision anywhere from 5-7 inches down from the knee. Postoperatively, the patient is positioned in a continuous passive motion machine within the first 24 hours and is walking the day after surgery. The first couple days are very painful and the pain medication will alleviate, but not eliminate the pain. The patient must really push hard to get range of motion. Our goal for having a person go home as that the range of motion is approximately 0-90° of flexion and that they can walk with a walker or cane. A walking aid is to be dispensed once the patient is secure enough. In general there, there is no cast or brace required with routine knee replacement. In the long term, we do not encourage our patients to run for the sake of running, although pending their preoperative status, we often allow patient to play doubles tennis or comparative activities. We also allowed them to do gentle intermediate downhill skiing if they are truly an expert skier. Biking is encouraged as well swimming. The followup periods are usually 3 weeks, 6 weeks, 3 months, and yearly intervals. Potential complications with total knee replacement included anesthetic complications and death, infection around 1%, nerve damage, by which means peroneal nerve palsy, footdrop or flapping foot with ambulation. This is particularly more apt to occur in the patient with a valgus or knock-knee deformity. The incidence can be quite high in this particular patient population. There will be areas of skin numbness, but this is not an untoward effect nor do we consider it a complication. Other potential complications include dislocation of the patella component, usually less than 2%; loosening of the tibial or femoral component is much more infrequent. Most often this occurs with infection or long-term use. Patient of extreme risk including markedly overweight patient's may be more prone to prosthetic wear. Major blood vessel damage is also extremely rare. Directly because of the anatomic proximity of the popliteal artery this could be lacerated with subsequent repair required. Be it unlikely, disruption of the popliteal artery could theoretically result in amputation. Similarly, infection could theoretically result in amputation if one were to grow out of an organism that cannot be controlled with antibiotics. General medical complications include phlebitis, for which we would prophylactically anticoagulate patients, but could still occur, and fatal pulmonary embolus which has been reported. Cardiovascular problems, such as heart attack or ischemia are always a concern with such hemodynamic changes in the blood vascular system. Other General complications are rare, but anything medicine could theoretically happen. I think the patient understands the risk benefit ratio of total knee replacement and will think about whether there like to pursue with an operation or nonoperative treatment program. \par

## 2019-04-12 NOTE — PHYSICAL EXAM
[de-identified] : General: obese, but well-appearing and not in acute distress. \par Gait: antalgic. \par GENERAL APPEARANCE: Well nourished and hydrated, pleasant, alert, and oriented x 3. Appears their stated age. \par HEENT: Normocephalic, extraocular eye motion intact. Nasal septum midline. Oral cavity clear. External auditory canal clear. \par RESPIRATORY: Breath sounds clear and audible in all lobes. No wheezing, No accessory muscle use.\par CARDIOVASCULAR: No apparent abnormalities. No lower leg edema. No varicosities. Pedal pulses are palpable.\par NEUROLOGIC: Sensation is normal, no muscle weakness in the upper or lower extremities.\par DERMATOLOGIC: No apparent skin lesions, moist, warm, no rash.\par SPINE: Cervical spine appears normal and moves freely; thoracic spine appears normal and moves freely; lumbosacral spine appears normal and moves freely, normal, nontender.\par MUSCULOSKELETAL: Hands, wrists, and elbows are normal and move freely, shoulders are normal and move freely.\par \par Left knee examination demonstrates no prior incisions, pain and crepitus with gentle range of motion but range of motion is preserved. \par \par Right knee examination demonstrates a midline incision, pain in the medial lateral joint line, range of motion shows near full extension and flexion to 95°. \par 5/5 motor strength in bilateral lower extremities. Sensory: Intact in bilateral lower extremities. DTRs: Biceps, brachioradialis, triceps, patellar, ankle and plantar 2+ and symmetric bilaterally. Pulses: dorsalis pedis, posterior tibial, femoral, popliteal, and radial 2+ and symmetric bilaterally. \par

## 2019-04-12 NOTE — HISTORY OF PRESENT ILLNESS
[Pain Location] : pain [6] : a current pain level of 6/10 [___ yrs] : [unfilled] year(s) ago [Worsening] : worsening [2] : a minimum pain level of 2/10 [5] : an average pain level of 5/10 [8] : a maximum pain level of 8/10 [Standing] : standing [Daily] : ~He/She~ states the symptoms seem to be occuring daily [Walking] : worsened by walking [de-identified] : 63 year old female presents with bilateral knee pain.\par She had a recent stroke\par For her right knee, she had a previous tibial plateau fracture with posttraumatic arthritis and retained lateral tibial plateau plate.\par Her left knee pain is becoming severe. She is interested in a left TKA.\par She stopped walking 6 months ago as per patient. She can only do short periods of standing and transfer. \par The patient states she had a stroke 12 months ago and is on Xarelto now.\par She uses a motorized wheel chair most of the time. She is having a hard time walking and standing. She notes constant pain in the left knee even without standing. She received bilateral knee cortisone injections in the past. She states the symptoms are stable.

## 2019-06-05 ENCOUNTER — APPOINTMENT (OUTPATIENT)
Dept: ORTHOPEDIC SURGERY | Facility: CLINIC | Age: 64
End: 2019-06-05
Payer: MEDICARE

## 2019-06-05 DIAGNOSIS — E66.01 MORBID (SEVERE) OBESITY DUE TO EXCESS CALORIES: ICD-10-CM

## 2019-06-05 DIAGNOSIS — M17.12 UNILATERAL PRIMARY OSTEOARTHRITIS, LEFT KNEE: ICD-10-CM

## 2019-06-05 DIAGNOSIS — M17.11 UNILATERAL PRIMARY OSTEOARTHRITIS, RIGHT KNEE: ICD-10-CM

## 2019-06-05 PROCEDURE — 99214 OFFICE O/P EST MOD 30 MIN: CPT | Mod: 25

## 2019-06-05 PROCEDURE — 20610 DRAIN/INJ JOINT/BURSA W/O US: CPT | Mod: 50

## 2019-06-05 NOTE — DISCUSSION/SUMMARY
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 The patient is not a candidate for total knee replacements at this time as she is nonambulatory. I think pursuing bariatric surgery and PT before a knee replacement will lower the risk for postop infection. She elected to receive a cortisone injection in her bilateral knees which she tolerated well. The patient will follow up in 3 months for re-evaluation and possible repeat cortisone injections.\par \par A knee replacement means resurfacing of all 3 surfaces of the patella, the femur, and tibia with metal and plastic parts. The prosthetic parts are usually cemented into position and well outpatient range of motion from full extension to about 120° of flexion. The postoperative motion, however, is determined by multiple factors, the most important of which is preoperative motion. In general, the better motion preoperatively, the better the motion postoperatively. The operation, pending medical clearance, gently requires hospitalization of 3-4 days for one knee, 5-7 days for bilateral knee replacements. In general, we prefer to perform the procedure under spinal anesthesia with femoral nerve block and occasional single shot sciatic nerve block. We may ask a patient to give 2 units of blood for bilateral total knee arthroplasties, for one knee we institute a normogram which may include administration of preoperative Procrit. The operative procedure takes probably 1-2 hours. The operation requires a straight incision anywhere from 5-7 inches down from the knee. Postoperatively, the patient is positioned in a continuous passive motion machine within the first 24 hours and is walking the day after surgery. The first couple days are very painful and the pain medication will alleviate, but not eliminate the pain. The patient must really push hard to get range of motion. Our goal for having a person go home as that the range of motion is approximately 0-90° of flexion and that they can walk with a walker or cane. A walking aid is to be dispensed once the patient is secure enough. In general there, there is no cast or brace required with routine knee replacement. In the long term, we do not encourage our patients to run for the sake of running, although pending their preoperative status, we often allow patient to play doubles tennis or comparative activities. We also allowed them to do gentle intermediate downhill skiing if they are truly an expert skier. Biking is encouraged as well swimming. The followup periods are usually 3 weeks, 6 weeks, 3 months, and yearly intervals. Potential complications with total knee replacement included anesthetic complications and death, infection around 1%, nerve damage, by which means peroneal nerve palsy, footdrop or flapping foot with ambulation. This is particularly more apt to occur in the patient with a valgus or knock-knee deformity. The incidence can be quite high in this particular patient population. There will be areas of skin numbness, but this is not an untoward effect nor do we consider it a complication. Other potential complications include dislocation of the patella component, usually less than 2%; loosening of the tibial or femoral component is much more infrequent. Most often this occurs with infection or long-term use. Patient of extreme risk including markedly overweight patient's may be more prone to prosthetic wear. Major blood vessel damage is also extremely rare. Directly because of the anatomic proximity of the popliteal artery this could be lacerated with subsequent repair required. Be it unlikely, disruption of the popliteal artery could theoretically result in amputation. Similarly, infection could theoretically result in amputation if one were to grow out of an organism that cannot be controlled with antibiotics. General medical complications include phlebitis, for which we would prophylactically anticoagulate patients, but could still occur, and fatal pulmonary embolus which has been reported. Cardiovascular problems, such as heart attack or ischemia are always a concern with such hemodynamic changes in the blood vascular system. Other General complications are rare, but anything medicine could theoretically happen. I think the patient understands the risk benefit ratio of total knee replacement and will think about whether there like to pursue with an operation or nonoperative treatment program. \par

## 2019-06-05 NOTE — PROCEDURE
[de-identified] : I injected the patient's bilateral knee today with cortisone.\par \par I discussed at length with the patient the planned steroid and lidocaine injection. The risks, benefits, convalescence and alternatives were reviewed. The possible side effects discussed included but were not limited to: pain, swelling, heat, bleeding, and redness. Symptoms are generally mild but if they are extensive then contact the office. Giving pain relievers by mouth such as NSAIDs or Tylenol can generally treat the reactions to steroid and lidocaine. Rare cases of infection have been noted. Rash, hives and itching may occur post injection. If you have muscle pain or cramps, flushing and or swelling of the face, rapid heart beat, nausea, dizziness, fever, chills, headache, difficulty breathing, swelling in the arms or legs, or have a prickly feeling of your skin, contact a health care provider immediately. Following this discussion, the knee was prepped with Alcohol and under sterile condition the 80 mg Depo-Medrol and 6 cc Lidocaine injection was performed with a 20 gauge needle through a superolateral injection site. The needle was introduced into the joint, aspiration was performed to ensure intra-articular placement and the medication was injected. Upon withdrawal of the needle the site was cleaned with alcohol and a band aid applied. The patient tolerated the injection well and there were no adverse effects. Post injection instructions included no strenuous activity for 24 hours, cryotherapy and if there are any adverse effects to contact the office. \par \par \par

## 2019-06-05 NOTE — PHYSICAL EXAM
[Antalgic] : antalgic [Wheelchair] : uses a wheelchair [Obese] : obese [Poor Appearance] : well-appearing [Acute Distress] : not in acute distress [de-identified] : GENERAL APPEARANCE: Well nourished and hydrated, pleasant, alert, and oriented x 3. Appears their stated age. \par HEENT: Normocephalic, extraocular eye motion intact. Nasal septum midline. Oral cavity clear. External auditory canal clear. \par RESPIRATORY: Breath sounds clear and audible in all lobes. No wheezing, No accessory muscle use.\par CARDIOVASCULAR: No apparent abnormalities. No lower leg edema. No varicosities. Pedal pulses are palpable.\par NEUROLOGIC: Sensation is normal, no muscle weakness in the upper or lower extremities.\par DERMATOLOGIC: No apparent skin lesions, moist, warm, no rash.\par SPINE: Cervical spine appears normal and moves freely; thoracic spine appears normal and moves freely; lumbosacral spine appears normal and moves freely, normal, nontender.\par MUSCULOSKELETAL: Hands, wrists, and elbows are normal and move freely, shoulders are normal and move freely.  [de-identified] : Left knee examination demonstrates no prior incisions, pain and crepitus with gentle range of motion but range of motion is preserved. \par Right knee examination demonstrates a midline incision, pain in the medial lateral joint line, range of motion shows near full extension and flexion to 95°.

## 2019-06-05 NOTE — HISTORY OF PRESENT ILLNESS
[Pain Location] : pain [Worsening] : worsening [___ yrs] : [unfilled] year(s) ago [6] : a current pain level of 6/10 [5] : an average pain level of 5/10 [2] : a minimum pain level of 2/10 [8] : a maximum pain level of 8/10 [Standing] : standing [Daily] : ~He/She~ states the symptoms seem to be occuring daily [Walking] : worsened by walking [de-identified] : 64 year old female presents with bilateral knee pain.  Patient had injections in April 2019 states pain has returned and is worsening as of 2 weeks ago. In the past cortisone injections provided good relief, but recent injections are helping less. Pain in left knee worse than right currently. \par For her right knee, she had a previous tibial plateau fracture with posttraumatic arthritis and retained lateral tibial plateau plate.\par The patient states she had a stroke 12 months ago and is on Xarelto now.\par She uses a motorized wheel chair most of the time. She is having a hard time walking and standing. She notes constant pain in the left knee even without standing. She is attempting to walk recently though.

## 2019-06-05 NOTE — ADDENDUM
[FreeTextEntry1] : I, Wilmar Calixto, acted solely as a scribe for Dr. Clifford Rodriguez on this date 06/05/2019.

## 2019-06-17 ENCOUNTER — APPOINTMENT (OUTPATIENT)
Dept: INTERNAL MEDICINE | Facility: CLINIC | Age: 64
End: 2019-06-17

## 2019-09-10 ENCOUNTER — APPOINTMENT (OUTPATIENT)
Dept: ORTHOPEDIC SURGERY | Facility: CLINIC | Age: 64
End: 2019-09-10

## 2020-04-22 NOTE — ASU PREOP CHECKLIST - AS BP NONINV SITE
Dr. Orozco aware pt unable to swallow medications at this time. Instructed to hold rifAXIMin at this time per md   right upper arm

## 2021-06-08 NOTE — PHYSICAL THERAPY INITIAL EVALUATION ADULT - REHAB POTENTIAL, PT EVAL
Ruthanna Ormond from [facility] called stating that if Haleigh Disla is going to need dressing changes they will need an order and her office notes faxed to [phone]. good, to achieve stated therapy goals

## 2022-02-10 ENCOUNTER — NON-APPOINTMENT (OUTPATIENT)
Age: 67
End: 2022-02-10

## 2022-02-11 ENCOUNTER — INPATIENT (INPATIENT)
Facility: HOSPITAL | Age: 67
LOS: 10 days | Discharge: HOME | DRG: 64 | End: 2022-02-22
Attending: GENERAL ACUTE CARE HOSPITAL | Admitting: INTERNAL MEDICINE
Payer: MEDICARE

## 2022-02-11 VITALS
DIASTOLIC BLOOD PRESSURE: 67 MMHG | SYSTOLIC BLOOD PRESSURE: 95 MMHG | OXYGEN SATURATION: 100 % | TEMPERATURE: 96 F | HEART RATE: 160 BPM | RESPIRATION RATE: 16 BRPM

## 2022-02-11 DIAGNOSIS — K43.9 VENTRAL HERNIA WITHOUT OBSTRUCTION OR GANGRENE: Chronic | ICD-10-CM

## 2022-02-11 DIAGNOSIS — Z90.81 ACQUIRED ABSENCE OF SPLEEN: Chronic | ICD-10-CM

## 2022-02-11 DIAGNOSIS — Z96.659 PRESENCE OF UNSPECIFIED ARTIFICIAL KNEE JOINT: Chronic | ICD-10-CM

## 2022-02-11 DIAGNOSIS — Z87.09 PERSONAL HISTORY OF OTHER DISEASES OF THE RESPIRATORY SYSTEM: Chronic | ICD-10-CM

## 2022-02-11 LAB
ACANTHOCYTES BLD QL SMEAR: SLIGHT — SIGNIFICANT CHANGE UP
ALBUMIN SERPL ELPH-MCNC: 3.3 G/DL — SIGNIFICANT CHANGE UP (ref 3.3–5.2)
ALBUMIN SERPL ELPH-MCNC: 4.1 G/DL — SIGNIFICANT CHANGE UP (ref 3.3–5.2)
ALP SERPL-CCNC: 190 U/L — HIGH (ref 40–120)
ALP SERPL-CCNC: 234 U/L — HIGH (ref 40–120)
ALT FLD-CCNC: 112 U/L — HIGH
ALT FLD-CCNC: 95 U/L — HIGH
ANION GAP SERPL CALC-SCNC: 17 MMOL/L — SIGNIFICANT CHANGE UP (ref 5–17)
ANION GAP SERPL CALC-SCNC: 23 MMOL/L — HIGH (ref 5–17)
ANISOCYTOSIS BLD QL: SLIGHT — SIGNIFICANT CHANGE UP
APPEARANCE UR: ABNORMAL
AST SERPL-CCNC: 88 U/L — HIGH
AST SERPL-CCNC: 90 U/L — HIGH
BACTERIA # UR AUTO: ABNORMAL
BASE EXCESS BLDV CALC-SCNC: 5.3 MMOL/L — HIGH (ref -2–3)
BASOPHILS # BLD AUTO: 0 K/UL — SIGNIFICANT CHANGE UP (ref 0–0.2)
BASOPHILS NFR BLD AUTO: 0 % — SIGNIFICANT CHANGE UP (ref 0–2)
BILIRUB SERPL-MCNC: 2 MG/DL — SIGNIFICANT CHANGE UP (ref 0.4–2)
BILIRUB SERPL-MCNC: 2.1 MG/DL — HIGH (ref 0.4–2)
BILIRUB UR-MCNC: ABNORMAL
BUN SERPL-MCNC: 78.8 MG/DL — HIGH (ref 8–20)
BUN SERPL-MCNC: 82.5 MG/DL — HIGH (ref 8–20)
BURR CELLS BLD QL SMEAR: PRESENT — SIGNIFICANT CHANGE UP
CA-I SERPL-SCNC: 1.24 MMOL/L — SIGNIFICANT CHANGE UP (ref 1.15–1.33)
CALCIUM SERPL-MCNC: 10.8 MG/DL — HIGH (ref 8.6–10.2)
CALCIUM SERPL-MCNC: 9.3 MG/DL — SIGNIFICANT CHANGE UP (ref 8.6–10.2)
CHLORIDE BLDV-SCNC: 115 MMOL/L — HIGH (ref 98–107)
CHLORIDE SERPL-SCNC: 109 MMOL/L — HIGH (ref 98–107)
CHLORIDE SERPL-SCNC: 113 MMOL/L — HIGH (ref 98–107)
CK SERPL-CCNC: 128 U/L — SIGNIFICANT CHANGE UP (ref 25–170)
CO2 SERPL-SCNC: 21 MMOL/L — LOW (ref 22–29)
CO2 SERPL-SCNC: 24 MMOL/L — SIGNIFICANT CHANGE UP (ref 22–29)
COLOR SPEC: YELLOW — SIGNIFICANT CHANGE UP
CREAT SERPL-MCNC: 1.2 MG/DL — SIGNIFICANT CHANGE UP (ref 0.5–1.3)
CREAT SERPL-MCNC: 1.35 MG/DL — HIGH (ref 0.5–1.3)
DIFF PNL FLD: ABNORMAL
EOSINOPHIL # BLD AUTO: 0.32 K/UL — SIGNIFICANT CHANGE UP (ref 0–0.5)
EOSINOPHIL NFR BLD AUTO: 1.7 % — SIGNIFICANT CHANGE UP (ref 0–6)
EPI CELLS # UR: SIGNIFICANT CHANGE UP
GAS PNL BLDV: 154 MMOL/L — HIGH (ref 136–145)
GAS PNL BLDV: SIGNIFICANT CHANGE UP
GAS PNL BLDV: SIGNIFICANT CHANGE UP
GIANT PLATELETS BLD QL SMEAR: PRESENT — SIGNIFICANT CHANGE UP
GLUCOSE BLDV-MCNC: 146 MG/DL — HIGH (ref 70–99)
GLUCOSE SERPL-MCNC: 127 MG/DL — HIGH (ref 70–99)
GLUCOSE SERPL-MCNC: 135 MG/DL — HIGH (ref 70–99)
GLUCOSE UR QL: NEGATIVE MG/DL — SIGNIFICANT CHANGE UP
HCO3 BLDV-SCNC: 31 MMOL/L — HIGH (ref 22–29)
HCT VFR BLD CALC: 54.2 % — HIGH (ref 34.5–45)
HCT VFR BLDA CALC: 53 % — SIGNIFICANT CHANGE UP
HGB BLD CALC-MCNC: 17.6 G/DL — HIGH (ref 11.7–16.1)
HGB BLD-MCNC: 17.2 G/DL — HIGH (ref 11.5–15.5)
HYALINE CASTS # UR AUTO: ABNORMAL /LPF
KETONES UR-MCNC: ABNORMAL
LACTATE BLDV-MCNC: 3.5 MMOL/L — HIGH (ref 0.5–2)
LACTATE BLDV-MCNC: 4.7 MMOL/L — CRITICAL HIGH (ref 0.5–2)
LEUKOCYTE ESTERASE UR-ACNC: ABNORMAL
LYMPHOCYTES # BLD AUTO: 1.47 K/UL — SIGNIFICANT CHANGE UP (ref 1–3.3)
LYMPHOCYTES # BLD AUTO: 7.9 % — LOW (ref 13–44)
MACROCYTES BLD QL: SLIGHT — SIGNIFICANT CHANGE UP
MAGNESIUM SERPL-MCNC: 3.4 MG/DL — HIGH (ref 1.8–2.6)
MANUAL SMEAR VERIFICATION: SIGNIFICANT CHANGE UP
MCHC RBC-ENTMCNC: 27.4 PG — SIGNIFICANT CHANGE UP (ref 27–34)
MCHC RBC-ENTMCNC: 31.7 GM/DL — LOW (ref 32–36)
MCV RBC AUTO: 86.3 FL — SIGNIFICANT CHANGE UP (ref 80–100)
METAMYELOCYTES # FLD: 1.8 % — HIGH (ref 0–0)
MONOCYTES # BLD AUTO: 1.64 K/UL — HIGH (ref 0–0.9)
MONOCYTES NFR BLD AUTO: 8.8 % — SIGNIFICANT CHANGE UP (ref 2–14)
NEUTROPHILS # BLD AUTO: 14.9 K/UL — HIGH (ref 1.8–7.4)
NEUTROPHILS NFR BLD AUTO: 79.8 % — HIGH (ref 43–77)
NITRITE UR-MCNC: NEGATIVE — SIGNIFICANT CHANGE UP
NT-PROBNP SERPL-SCNC: 587 PG/ML — HIGH (ref 0–300)
OVALOCYTES BLD QL SMEAR: SLIGHT — SIGNIFICANT CHANGE UP
PCO2 BLDV: 53 MMHG — HIGH (ref 39–42)
PH BLDV: 7.37 — SIGNIFICANT CHANGE UP (ref 7.32–7.43)
PH UR: 6 — SIGNIFICANT CHANGE UP (ref 5–8)
PLAT MORPH BLD: NORMAL — SIGNIFICANT CHANGE UP
PLATELET # BLD AUTO: 259 K/UL — SIGNIFICANT CHANGE UP (ref 150–400)
PO2 BLDV: 46 MMHG — HIGH (ref 25–45)
POIKILOCYTOSIS BLD QL AUTO: SLIGHT — SIGNIFICANT CHANGE UP
POLYCHROMASIA BLD QL SMEAR: SLIGHT — SIGNIFICANT CHANGE UP
POTASSIUM BLDV-SCNC: 4.5 MMOL/L — SIGNIFICANT CHANGE UP (ref 3.5–5.1)
POTASSIUM SERPL-MCNC: 4.1 MMOL/L — SIGNIFICANT CHANGE UP (ref 3.5–5.3)
POTASSIUM SERPL-MCNC: 5 MMOL/L — SIGNIFICANT CHANGE UP (ref 3.5–5.3)
POTASSIUM SERPL-SCNC: 4.1 MMOL/L — SIGNIFICANT CHANGE UP (ref 3.5–5.3)
POTASSIUM SERPL-SCNC: 5 MMOL/L — SIGNIFICANT CHANGE UP (ref 3.5–5.3)
PROT SERPL-MCNC: 7 G/DL — SIGNIFICANT CHANGE UP (ref 6.6–8.7)
PROT SERPL-MCNC: 8.9 G/DL — HIGH (ref 6.6–8.7)
PROT UR-MCNC: 30 MG/DL
RAPID RVP RESULT: SIGNIFICANT CHANGE UP
RBC # BLD: 6.28 M/UL — HIGH (ref 3.8–5.2)
RBC # FLD: 18.4 % — HIGH (ref 10.3–14.5)
RBC BLD AUTO: ABNORMAL
RBC CASTS # UR COMP ASSIST: ABNORMAL /HPF (ref 0–4)
SAO2 % BLDV: 74.5 % — SIGNIFICANT CHANGE UP
SARS-COV-2 RNA SPEC QL NAA+PROBE: SIGNIFICANT CHANGE UP
SODIUM SERPL-SCNC: 151 MMOL/L — HIGH (ref 135–145)
SODIUM SERPL-SCNC: 156 MMOL/L — HIGH (ref 135–145)
SP GR SPEC: 1.02 — SIGNIFICANT CHANGE UP (ref 1.01–1.02)
TARGETS BLD QL SMEAR: SLIGHT — SIGNIFICANT CHANGE UP
TROPONIN T SERPL-MCNC: 0.05 NG/ML — SIGNIFICANT CHANGE UP (ref 0–0.06)
UROBILINOGEN FLD QL: 4 MG/DL
WBC # BLD: 18.67 K/UL — HIGH (ref 3.8–10.5)
WBC # FLD AUTO: 18.67 K/UL — HIGH (ref 3.8–10.5)
WBC UR QL: SIGNIFICANT CHANGE UP /HPF (ref 0–5)

## 2022-02-11 PROCEDURE — 73200 CT UPPER EXTREMITY W/O DYE: CPT | Mod: 26,RT,MG

## 2022-02-11 PROCEDURE — 71260 CT THORAX DX C+: CPT | Mod: 26,MA

## 2022-02-11 PROCEDURE — 72170 X-RAY EXAM OF PELVIS: CPT | Mod: 26

## 2022-02-11 PROCEDURE — 70450 CT HEAD/BRAIN W/O DYE: CPT | Mod: 26,MA

## 2022-02-11 PROCEDURE — G1004: CPT

## 2022-02-11 PROCEDURE — 99291 CRITICAL CARE FIRST HOUR: CPT | Mod: GC

## 2022-02-11 PROCEDURE — 73701 CT LOWER EXTREMITY W/DYE: CPT | Mod: 26,RT,MA

## 2022-02-11 PROCEDURE — 72125 CT NECK SPINE W/O DYE: CPT | Mod: 26,MG,77

## 2022-02-11 PROCEDURE — 71045 X-RAY EXAM CHEST 1 VIEW: CPT | Mod: 26

## 2022-02-11 PROCEDURE — 72125 CT NECK SPINE W/O DYE: CPT | Mod: 26,MA

## 2022-02-11 PROCEDURE — 74177 CT ABD & PELVIS W/CONTRAST: CPT | Mod: 26,MA

## 2022-02-11 PROCEDURE — 73090 X-RAY EXAM OF FOREARM: CPT | Mod: 26,LT

## 2022-02-11 RX ORDER — VANCOMYCIN HCL 1 G
1000 VIAL (EA) INTRAVENOUS ONCE
Refills: 0 | Status: COMPLETED | OUTPATIENT
Start: 2022-02-11 | End: 2022-02-11

## 2022-02-11 RX ORDER — METOPROLOL TARTRATE 50 MG
5 TABLET ORAL ONCE
Refills: 0 | Status: COMPLETED | OUTPATIENT
Start: 2022-02-11 | End: 2022-02-11

## 2022-02-11 RX ORDER — SODIUM CHLORIDE 9 MG/ML
1000 INJECTION INTRAMUSCULAR; INTRAVENOUS; SUBCUTANEOUS ONCE
Refills: 0 | Status: COMPLETED | OUTPATIENT
Start: 2022-02-11 | End: 2022-02-11

## 2022-02-11 RX ORDER — FENTANYL CITRATE 50 UG/ML
50 INJECTION INTRAVENOUS ONCE
Refills: 0 | Status: DISCONTINUED | OUTPATIENT
Start: 2022-02-11 | End: 2022-02-11

## 2022-02-11 RX ORDER — DILTIAZEM HCL 120 MG
20 CAPSULE, EXT RELEASE 24 HR ORAL ONCE
Refills: 0 | Status: COMPLETED | OUTPATIENT
Start: 2022-02-11 | End: 2022-02-11

## 2022-02-11 RX ORDER — SODIUM CHLORIDE 9 MG/ML
1000 INJECTION, SOLUTION INTRAVENOUS
Refills: 0 | Status: DISCONTINUED | OUTPATIENT
Start: 2022-02-11 | End: 2022-02-11

## 2022-02-11 RX ORDER — PIPERACILLIN AND TAZOBACTAM 4; .5 G/20ML; G/20ML
3.38 INJECTION, POWDER, LYOPHILIZED, FOR SOLUTION INTRAVENOUS ONCE
Refills: 0 | Status: COMPLETED | OUTPATIENT
Start: 2022-02-11 | End: 2022-02-11

## 2022-02-11 RX ORDER — DILTIAZEM HCL 120 MG
5 CAPSULE, EXT RELEASE 24 HR ORAL
Qty: 125 | Refills: 0 | Status: DISCONTINUED | OUTPATIENT
Start: 2022-02-11 | End: 2022-02-12

## 2022-02-11 RX ORDER — SODIUM CHLORIDE 9 MG/ML
1000 INJECTION, SOLUTION INTRAVENOUS
Refills: 0 | Status: COMPLETED | OUTPATIENT
Start: 2022-02-11 | End: 2022-02-11

## 2022-02-11 RX ADMIN — PIPERACILLIN AND TAZOBACTAM 3.38 GRAM(S): 4; .5 INJECTION, POWDER, LYOPHILIZED, FOR SOLUTION INTRAVENOUS at 16:00

## 2022-02-11 RX ADMIN — Medication 250 MILLIGRAM(S): at 16:00

## 2022-02-11 RX ADMIN — Medication 1000 MILLIGRAM(S): at 17:00

## 2022-02-11 RX ADMIN — Medication 5 MG/HR: at 23:25

## 2022-02-11 RX ADMIN — Medication 20 MILLIGRAM(S): at 18:31

## 2022-02-11 RX ADMIN — Medication 20 MILLIGRAM(S): at 20:03

## 2022-02-11 RX ADMIN — Medication 5 MILLIGRAM(S): at 21:22

## 2022-02-11 RX ADMIN — SODIUM CHLORIDE 1000 MILLILITER(S): 9 INJECTION INTRAMUSCULAR; INTRAVENOUS; SUBCUTANEOUS at 16:25

## 2022-02-11 RX ADMIN — SODIUM CHLORIDE 1000 MILLILITER(S): 9 INJECTION INTRAMUSCULAR; INTRAVENOUS; SUBCUTANEOUS at 15:05

## 2022-02-11 RX ADMIN — PIPERACILLIN AND TAZOBACTAM 200 GRAM(S): 4; .5 INJECTION, POWDER, LYOPHILIZED, FOR SOLUTION INTRAVENOUS at 15:35

## 2022-02-11 RX ADMIN — SODIUM CHLORIDE 1000 MILLILITER(S): 9 INJECTION, SOLUTION INTRAVENOUS at 19:02

## 2022-02-11 RX ADMIN — SODIUM CHLORIDE 1000 MILLILITER(S): 9 INJECTION INTRAMUSCULAR; INTRAVENOUS; SUBCUTANEOUS at 15:10

## 2022-02-11 RX ADMIN — FENTANYL CITRATE 50 MICROGRAM(S): 50 INJECTION INTRAVENOUS at 16:00

## 2022-02-11 RX ADMIN — SODIUM CHLORIDE 1000 MILLILITER(S): 9 INJECTION, SOLUTION INTRAVENOUS at 17:21

## 2022-02-11 RX ADMIN — FENTANYL CITRATE 50 MICROGRAM(S): 50 INJECTION INTRAVENOUS at 17:22

## 2022-02-11 NOTE — ED PROVIDER NOTE - ATTENDING CONTRIBUTION TO CARE
Pt. in the ED after being found on the floor. PE: Eyes opened spontaneously, moaning and withdrawing to pain. NO history able to be obtained from the patient. Pt. is tachycardic and in A-fib. Skin discoloration noted to Right upper extremities. I, Dr. Sneed, performed a face to face bedside interview with this patient regarding history of present illness, review of symptoms and relevant past medical, social and family history.  I completed an independent physical examination.  I have also reviewed the resident's note(s) and discussed the plan with the resident.
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
## 2022-02-11 NOTE — ED PROVIDER NOTE - PROGRESS NOTE DETAILS
Resident Pamela Sparks: Given the significant and immediate threats to this patient based on initial presentation, the benefits of emergency contrast-enhanced CT imaging without obtaining GFR/creatinine serum level results greatly outweigh the potential risk of harm due to contrast-induced nephropathy. Due to the severity of her condition, imaging should proceed without labs or consent. Resident Pamela Sparks: pt hypernatremic to 156. half-normal saline started. Diltiazem given for afib w/ rvr w/ good response. Resident Pamela Sparks: HR back to 150, second bolus diltiazem given. Resident Pamela Sparks: neuro ICU reports MCA stroke is subacute, not a candidate for neuro ICU. Cardiology called, discussed findings, recs diltiazem drip. Not a candidate for anticoagulation due to risk of hemorrhagic conversion of stroke and traumatic injuries. per Dr. Chand, spine, not operative tonight. c-collar placed. Plan for repeat CT cervical. MICU updated. Resident Pamela Sparks: Put consults in for AM for neuro for mca infarct and cardio for afib w/ rvr and L atrial appendage thrombus. Trauma consulted for fall and R extremity wounds, they report the wounds are pressure ulcer type, no operative management warranted for rib or soft tissue injuries. Cervical management deferred to spine. Resident Pamela Sparks: MICU saw pt, recs lopressor due to longer-acting than diltiazem. Recs consult neuro ICU for infarct and spine injury. Spine PA at bedside, attending Dr. Chand to see.
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
## 2022-02-11 NOTE — ED PROVIDER NOTE - PHYSICAL EXAMINATION
General: morbidly obese, ill appearing, covered in urine and fecal matter, moaning, nonverbal  Head:  NC, AT  Eyes: PERRLA  Ears: no erythema/drainage  Nose: midline, no bleeding/drainage  Mouth: poor dental hygiene  Neck/Back: No c/t/l step-off  Cardiac: tachycardic, irregular  Respiratory: CTABL, equal chest wall expansions  Abdomen: soft, ND, NT  Pelvis: stable  MSK/Vascular: RUE radial pulse +doppler, RLE prolonged cap refill ~4s, soft compartments, cool skin, no cyanosis  Neuro: GCS 10, moaning, eyes open, intermittently moves extremities, does not follow commands
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
## 2022-02-11 NOTE — ED ADULT NURSE NOTE - NSIMPLEMENTINTERV_GEN_ALL_ED
Implemented All Fall Risk Interventions:  [place] to call system. Call bell, personal items and telephone within reach. Instruct patient to call for assistance. Room bathroom lighting operational. Non-slip footwear when patient is off stretcher. Physically safe environment: no spills, clutter or unnecessary equipment. Stretcher in lowest position, wheels locked, appropriate side rails in place. Provide visual cue, wrist band, yellow gown, etc. Monitor gait and stability. Monitor for mental status changes and reorient to person, place, and time. Review medications for side effects contributing to fall risk. Reinforce activity limits and safety measures with patient and family.
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
## 2022-02-11 NOTE — ED PROVIDER NOTE - CARE PLAN
1 Principal Discharge DX:	Acute ischemic left MCA stroke  Secondary Diagnosis:	Atlantoaxial subluxation  Secondary Diagnosis:	Left atrial thrombus  Secondary Diagnosis:	Hypernatremia
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
## 2022-02-11 NOTE — ED ADULT NURSE NOTE - OBJECTIVE STATEMENT
Last known well on 2/1/2022 per EMS who spoke with family who called 911.  Pt arrives awake, not following commands.  Purposeful movement of left arm observed.  ecchymotic area with draining wound present to right thigh.  No right pedal or posterior tibial pulses present to palpation or doppler and delayed cap refill.  abrasions across abdomen.  Large ecchymotic area with loose skin on right forearm.  wound on right elbow.  Right hand pale with skin pruning to entire hand.  Positive right radial pulse.  Abrasions under breasts. Last known well on 2/1/2022 per EMS who spoke with family who called 911.  Pt arrives awake, not following commands.  Purposeful movement of left arm observed.  ecchymotic area with draining wound present to right thigh.  No right pedal or posterior tibial pulses present to palpation on doppler performed by Dr. Cabrera delayed cap refill.  abrasions across abdomen.  Large ecchymotic area with loose skin on right forearm.  wound on right elbow.  Right hand pale with skin pruning to entire hand.  Positive right radial pulse.  Abrasions under breasts. Large abrasion with a skin tear on left thigh.  Abrasion on left lower leg. Positve left pedal pulse and posterior tibial pulse.
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
## 2022-02-11 NOTE — ED ADULT NURSE NOTE - ED CARDIAC CAPILLARY REFILL
Spoke to mom, she will fax NP Paperwork on Monday Morning  NP Paperwork was emailed to her because she stated having problems with the My Chart Appt  She was informed that NP Paperwork is needed prior the appointment or it would have to be reschedule to a later date 
3 seconds or more

## 2022-02-12 DIAGNOSIS — I63.512 CEREBRAL INFARCTION DUE TO UNSPECIFIED OCCLUSION OR STENOSIS OF LEFT MIDDLE CEREBRAL ARTERY: ICD-10-CM

## 2022-02-12 LAB
-  COAGULASE NEGATIVE STAPHYLOCOCCUS: SIGNIFICANT CHANGE UP
ANION GAP SERPL CALC-SCNC: 16 MMOL/L — SIGNIFICANT CHANGE UP (ref 5–17)
ANION GAP SERPL CALC-SCNC: 18 MMOL/L — HIGH (ref 5–17)
APTT BLD: 109.1 SEC — HIGH (ref 27.5–35.5)
APTT BLD: 33.1 SEC — SIGNIFICANT CHANGE UP (ref 27.5–35.5)
APTT BLD: >200 SEC — CRITICAL HIGH (ref 27.5–35.5)
BASOPHILS # BLD AUTO: 0.01 K/UL — SIGNIFICANT CHANGE UP (ref 0–0.2)
BASOPHILS NFR BLD AUTO: 0.1 % — SIGNIFICANT CHANGE UP (ref 0–2)
BUN SERPL-MCNC: 63.9 MG/DL — HIGH (ref 8–20)
BUN SERPL-MCNC: 79.4 MG/DL — HIGH (ref 8–20)
CALCIUM SERPL-MCNC: 8.4 MG/DL — LOW (ref 8.6–10.2)
CALCIUM SERPL-MCNC: 9.5 MG/DL — SIGNIFICANT CHANGE UP (ref 8.6–10.2)
CHLORIDE SERPL-SCNC: 108 MMOL/L — HIGH (ref 98–107)
CHLORIDE SERPL-SCNC: 111 MMOL/L — HIGH (ref 98–107)
CO2 SERPL-SCNC: 19 MMOL/L — LOW (ref 22–29)
CO2 SERPL-SCNC: 22 MMOL/L — SIGNIFICANT CHANGE UP (ref 22–29)
CREAT SERPL-MCNC: 0.98 MG/DL — SIGNIFICANT CHANGE UP (ref 0.5–1.3)
CREAT SERPL-MCNC: 1.5 MG/DL — HIGH (ref 0.5–1.3)
EOSINOPHIL # BLD AUTO: 0.08 K/UL — SIGNIFICANT CHANGE UP (ref 0–0.5)
EOSINOPHIL NFR BLD AUTO: 0.6 % — SIGNIFICANT CHANGE UP (ref 0–6)
GLUCOSE SERPL-MCNC: 114 MG/DL — HIGH (ref 70–99)
GLUCOSE SERPL-MCNC: 144 MG/DL — HIGH (ref 70–99)
GRAM STN FLD: SIGNIFICANT CHANGE UP
HCT VFR BLD CALC: 23.8 % — LOW (ref 34.5–45)
HCT VFR BLD CALC: 38.6 % — SIGNIFICANT CHANGE UP (ref 34.5–45)
HCT VFR BLD CALC: 42.3 % — SIGNIFICANT CHANGE UP (ref 34.5–45)
HGB BLD-MCNC: 12 G/DL — SIGNIFICANT CHANGE UP (ref 11.5–15.5)
HGB BLD-MCNC: 13.5 G/DL — SIGNIFICANT CHANGE UP (ref 11.5–15.5)
HGB BLD-MCNC: 7.2 G/DL — LOW (ref 11.5–15.5)
IMM GRANULOCYTES NFR BLD AUTO: 0.9 % — SIGNIFICANT CHANGE UP (ref 0–1.5)
INR BLD: 1.93 RATIO — HIGH (ref 0.88–1.16)
LACTATE SERPL-SCNC: 2.5 MMOL/L — HIGH (ref 0.5–2)
LACTATE SERPL-SCNC: 8.5 MMOL/L — CRITICAL HIGH (ref 0.5–2)
LYMPHOCYTES # BLD AUTO: 0.88 K/UL — LOW (ref 1–3.3)
LYMPHOCYTES # BLD AUTO: 7.1 % — LOW (ref 13–44)
MAGNESIUM SERPL-MCNC: 2.9 MG/DL — HIGH (ref 1.6–2.6)
MANUAL SMEAR VERIFICATION: SIGNIFICANT CHANGE UP
MCHC RBC-ENTMCNC: 27 PG — SIGNIFICANT CHANGE UP (ref 27–34)
MCHC RBC-ENTMCNC: 27.1 PG — SIGNIFICANT CHANGE UP (ref 27–34)
MCHC RBC-ENTMCNC: 27.5 PG — SIGNIFICANT CHANGE UP (ref 27–34)
MCHC RBC-ENTMCNC: 30.3 GM/DL — LOW (ref 32–36)
MCHC RBC-ENTMCNC: 31.1 GM/DL — LOW (ref 32–36)
MCHC RBC-ENTMCNC: 31.9 GM/DL — LOW (ref 32–36)
MCV RBC AUTO: 84.6 FL — SIGNIFICANT CHANGE UP (ref 80–100)
MCV RBC AUTO: 87.1 FL — SIGNIFICANT CHANGE UP (ref 80–100)
MCV RBC AUTO: 90.8 FL — SIGNIFICANT CHANGE UP (ref 80–100)
METHOD TYPE: SIGNIFICANT CHANGE UP
MONOCYTES # BLD AUTO: 1.27 K/UL — HIGH (ref 0–0.9)
MONOCYTES NFR BLD AUTO: 10.2 % — SIGNIFICANT CHANGE UP (ref 2–14)
NEUTROPHILS # BLD AUTO: 10.05 K/UL — HIGH (ref 1.8–7.4)
NEUTROPHILS NFR BLD AUTO: 81.1 % — HIGH (ref 43–77)
ORGANISM # SPEC MICROSCOPIC CNT: SIGNIFICANT CHANGE UP
PHOSPHATE SERPL-MCNC: 4.4 MG/DL — SIGNIFICANT CHANGE UP (ref 2.4–4.7)
PLAT MORPH BLD: NORMAL — SIGNIFICANT CHANGE UP
PLATELET # BLD AUTO: 104 K/UL — LOW (ref 150–400)
PLATELET # BLD AUTO: 191 K/UL — SIGNIFICANT CHANGE UP (ref 150–400)
PLATELET # BLD AUTO: 216 K/UL — SIGNIFICANT CHANGE UP (ref 150–400)
POTASSIUM SERPL-MCNC: 3.8 MMOL/L — SIGNIFICANT CHANGE UP (ref 3.5–5.3)
POTASSIUM SERPL-MCNC: 4.6 MMOL/L — SIGNIFICANT CHANGE UP (ref 3.5–5.3)
POTASSIUM SERPL-SCNC: 3.8 MMOL/L — SIGNIFICANT CHANGE UP (ref 3.5–5.3)
POTASSIUM SERPL-SCNC: 4.6 MMOL/L — SIGNIFICANT CHANGE UP (ref 3.5–5.3)
PROTHROM AB SERPL-ACNC: 21.7 SEC — HIGH (ref 10.6–13.6)
RBC # BLD: 2.62 M/UL — LOW (ref 3.8–5.2)
RBC # BLD: 4.43 M/UL — SIGNIFICANT CHANGE UP (ref 3.8–5.2)
RBC # BLD: 5 M/UL — SIGNIFICANT CHANGE UP (ref 3.8–5.2)
RBC # FLD: 17.3 % — HIGH (ref 10.3–14.5)
RBC # FLD: 17.7 % — HIGH (ref 10.3–14.5)
RBC # FLD: 17.8 % — HIGH (ref 10.3–14.5)
RBC BLD AUTO: NORMAL — SIGNIFICANT CHANGE UP
SODIUM SERPL-SCNC: 143 MMOL/L — SIGNIFICANT CHANGE UP (ref 135–145)
SODIUM SERPL-SCNC: 151 MMOL/L — HIGH (ref 135–145)
SPECIMEN SOURCE: SIGNIFICANT CHANGE UP
SPECIMEN SOURCE: SIGNIFICANT CHANGE UP
WBC # BLD: 12.4 K/UL — HIGH (ref 3.8–10.5)
WBC # BLD: 21.23 K/UL — HIGH (ref 3.8–10.5)
WBC # BLD: 24.51 K/UL — HIGH (ref 3.8–10.5)
WBC # FLD AUTO: 12.4 K/UL — HIGH (ref 3.8–10.5)
WBC # FLD AUTO: 21.23 K/UL — HIGH (ref 3.8–10.5)
WBC # FLD AUTO: 24.51 K/UL — HIGH (ref 3.8–10.5)

## 2022-02-12 PROCEDURE — 71045 X-RAY EXAM CHEST 1 VIEW: CPT | Mod: 26

## 2022-02-12 PROCEDURE — 99223 1ST HOSP IP/OBS HIGH 75: CPT

## 2022-02-12 PROCEDURE — 99231 SBSQ HOSP IP/OBS SF/LOW 25: CPT

## 2022-02-12 PROCEDURE — 93306 TTE W/DOPPLER COMPLETE: CPT | Mod: 26

## 2022-02-12 RX ORDER — SODIUM CHLORIDE 9 MG/ML
1000 INJECTION, SOLUTION INTRAVENOUS
Refills: 0 | Status: DISCONTINUED | OUTPATIENT
Start: 2022-02-12 | End: 2022-02-13

## 2022-02-12 RX ORDER — HEPARIN SODIUM 5000 [USP'U]/ML
INJECTION INTRAVENOUS; SUBCUTANEOUS
Qty: 25000 | Refills: 0 | Status: DISCONTINUED | OUTPATIENT
Start: 2022-02-12 | End: 2022-02-13

## 2022-02-12 RX ORDER — ACETAMINOPHEN 500 MG
1000 TABLET ORAL ONCE
Refills: 0 | Status: COMPLETED | OUTPATIENT
Start: 2022-02-12 | End: 2022-02-12

## 2022-02-12 RX ORDER — SODIUM CHLORIDE 9 MG/ML
1000 INJECTION, SOLUTION INTRAVENOUS ONCE
Refills: 0 | Status: COMPLETED | OUTPATIENT
Start: 2022-02-12 | End: 2022-02-12

## 2022-02-12 RX ORDER — SODIUM CHLORIDE 9 MG/ML
1000 INJECTION, SOLUTION INTRAVENOUS
Refills: 0 | Status: DISCONTINUED | OUTPATIENT
Start: 2022-02-12 | End: 2022-02-12

## 2022-02-12 RX ORDER — VANCOMYCIN HCL 1 G
750 VIAL (EA) INTRAVENOUS EVERY 12 HOURS
Refills: 0 | Status: DISCONTINUED | OUTPATIENT
Start: 2022-02-12 | End: 2022-02-13

## 2022-02-12 RX ORDER — DILTIAZEM HCL 120 MG
30 CAPSULE, EXT RELEASE 24 HR ORAL EVERY 6 HOURS
Refills: 0 | Status: DISCONTINUED | OUTPATIENT
Start: 2022-02-12 | End: 2022-02-14

## 2022-02-12 RX ADMIN — SODIUM CHLORIDE 1000 MILLILITER(S): 9 INJECTION, SOLUTION INTRAVENOUS at 06:17

## 2022-02-12 RX ADMIN — Medication 30 MILLIGRAM(S): at 21:37

## 2022-02-12 RX ADMIN — HEPARIN SODIUM 1700 UNIT(S)/HR: 5000 INJECTION INTRAVENOUS; SUBCUTANEOUS at 15:46

## 2022-02-12 RX ADMIN — Medication 400 MILLIGRAM(S): at 23:47

## 2022-02-12 RX ADMIN — Medication 1000 MILLIGRAM(S): at 17:30

## 2022-02-12 RX ADMIN — Medication 400 MILLIGRAM(S): at 17:20

## 2022-02-12 RX ADMIN — SODIUM CHLORIDE 75 MILLILITER(S): 9 INJECTION, SOLUTION INTRAVENOUS at 17:51

## 2022-02-12 RX ADMIN — HEPARIN SODIUM 1900 UNIT(S)/HR: 5000 INJECTION INTRAVENOUS; SUBCUTANEOUS at 10:33

## 2022-02-12 RX ADMIN — Medication 250 MILLIGRAM(S): at 15:55

## 2022-02-12 RX ADMIN — Medication 30 MILLIGRAM(S): at 15:16

## 2022-02-12 RX ADMIN — SODIUM CHLORIDE 75 MILLILITER(S): 9 INJECTION, SOLUTION INTRAVENOUS at 06:46
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
## 2022-02-12 NOTE — ED ADULT NURSE REASSESSMENT NOTE - NS ED NURSE REASSESS COMMENT FT1
1000ml NS infused.  Stopped and will change to 0.45% NS per MD order
Alok Montero, [phone]
Dr Chand and PA at bedside to evaluate patient.  [place] J collar placed.  Repeat Cspine CT ordered.
Pt back from CT mumbling, incoherent speech.  Does not follow commands. Hernandez draining clear dark yellow urine, 500ml in bag. 20g IV cath in left upper arm not patent and removed.  Dr. Sparks made aware.  Purposeful movement of left arm. Has not moved right arm or right leg since arrival.  Great neice at bedside states pt is wheelchair bound but can bear weight.  Normally alert and oriented X 4 and can move all extremities.  pt appears to recognize her family, but still has incorherent speech.  Heart rate remains 130's to 160's, afib with stable blood pressure.  Dr. Sparks made aware.
Pt is calm and cooperative, pt is lying in bed with a heart rate that is now between the 140's-160's, Clare CONLEY made aware, RR is 19 and oxygen sat is 100 on 2 L NC, awaiting med orders, side rails up, will continue to monitor
Pt is calm and cooperative, pt is resting comfortably in bed with no signs of respiratory distress or pain, family member and orthopedic MD's at bedside, side rails up, will continue to monitor
Pt resting conformably in bed, pt shows no signs of respiratory distress or pain, pt has a heart rate that ranges from the 150's-170's, Clare CONLEY made aware, awaiting med orders, pt's Hernandez catheter now has 650 mL present in drainage bag, side rails up, will continue to monitor
Trauma resident and ortho spine resident at bedside to evaluate patient.
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
## 2022-02-12 NOTE — H&P ADULT - NSHPPHYSICALEXAM_GEN_ALL_CORE
Physical Examination:    General:  Opens eyes to tactile stim,  Follows no commands     HEENT: Pupils equal, reactive to light.  Symmetric. C-Collar in place     PULM: Clear to auscultation bilaterally, no significant sputum production    CVS: S1 S2 tachy, no murmurs, rubs, or gallops    ABD: Soft, nondistended, nontender, normoactive bowel sounds, no masses    EXT: +1 LE edema, nontender    SKIN: Warm and well perfused,  Wounds to R Forearm with eschar LE anterior aspect with wounds confluent extending to lower abdomin.      NEURO: Seen moving L LE and LUE only, No gaze preference noted, R UE and RLE flaccid.

## 2022-02-12 NOTE — PATIENT PROFILE ADULT - FALL HARM RISK - HARM RISK INTERVENTIONS
Communicate Risk of Fall with Harm to all staff/Reinforce activity limits and safety measures with patient and family/Tailored Fall Risk Interventions/Visual Cue: Yellow wristband and red socks/Bed in lowest position, wheels locked, appropriate side rails in place/Call bell, personal items and telephone in reach/Instruct patient to call for assistance before getting out of bed or chair/Non-slip footwear when patient is out of bed/[place] to call system/Physically safe environment - no spills, clutter or unnecessary equipment/Purposeful Proactive Rounding/Room/bathroom lighting operational, light cord in reach Assistance with ambulation/Assistance OOB with selected safe patient handling equipment/Communicate Risk of Fall with Harm to all staff/Reinforce activity limits and safety measures with patient and family/Tailored Fall Risk Interventions/Visual Cue: Yellow wristband and red socks/Bed in lowest position, wheels locked, appropriate side rails in place/Call bell, personal items and telephone in reach/Instruct patient to call for assistance before getting out of bed or chair/Non-slip footwear when patient is out of bed/[place] to call system/Physically safe environment - no spills, clutter or unnecessary equipment/Purposeful Proactive Rounding/Room/bathroom lighting operational, light cord in reach

## 2022-02-12 NOTE — PROGRESS NOTE ADULT - ASSESSMENT
Patient seen and examined this morning with regard to the cervical subluxation. When stable I recommend a cervical MRI. Repeat CAT scans show no evidence of cervical subluxation. No immediate indications for cervical surgery.    Attending statement:  I have personally seen this patient, and formed a face to face diagnostic evaluation on this patient on this date.  I have reviewed the PA, NP and or Medical/PA student and/or Resident documentation and agree with the history, physical exam and plan of care except if noted otherwise.

## 2022-02-12 NOTE — PROGRESS NOTE ADULT - SUBJECTIVE AND OBJECTIVE BOX
Patient seen and examined. Currently awake but not following commands. Exam limited 2/2 to patient current mental status.    Vital Signs Last 24 Hrs  T(C): 36.9 (12 Feb 2022 02:30), Max: 37.6 (11 Feb 2022 21:21)  T(F): 98.4 (12 Feb 2022 02:30), Max: 99.7 (11 Feb 2022 21:21)  HR: 105 (12 Feb 2022 08:00) (96 - 167)  BP: 126/80 (12 Feb 2022 08:00) (95/67 - 144/95)  BP(mean): 95 (12 Feb 2022 08:00) (69 - 95)  RR: 14 (12 Feb 2022 08:00) (14 - 20)  SpO2: 97% (12 Feb 2022 08:00) (90% - 100%)                          13.5   24.51 )-----------( 216      ( 12 Feb 2022 06:14 )             42.3       02-12    151<H>  |  111<H>  |  79.4<H>  ----------------------------<  114<H>  4.6   |  22.0  |  1.50<H>    Ca    9.5      12 Feb 2022 04:56  Phos  4.4     02-12  Mg     2.9     02-12    TPro  7.0  /  Alb  3.3  /  TBili  2.0  /  DBili  x   /  AST  90<H>  /  ALT  95<H>  /  AlkPhos  190<H>  02-11    Physical exam:  Currently in c-collar, head turned to left  Motor exam: Cannot assess due to mental status (moving left upper and lower extremity spontaneously)  Sensory: cannot assess  Vasc: Warm and perfused x 4 extremities    Assessment:  * C-collar  * Pain control  * MRI pending  * Plan to be finalized pending additional imaging.

## 2022-02-13 LAB
ALBUMIN SERPL ELPH-MCNC: 3 G/DL — LOW (ref 3.3–5.2)
ALP SERPL-CCNC: 166 U/L — HIGH (ref 40–120)
ALT FLD-CCNC: 71 U/L — HIGH
ANION GAP SERPL CALC-SCNC: 12 MMOL/L — SIGNIFICANT CHANGE UP (ref 5–17)
APTT BLD: 147.6 SEC — CRITICAL HIGH (ref 27.5–35.5)
APTT BLD: 87 SEC — HIGH (ref 27.5–35.5)
APTT BLD: 94.6 SEC — HIGH (ref 27.5–35.5)
AST SERPL-CCNC: 64 U/L — HIGH
BILIRUB SERPL-MCNC: 0.7 MG/DL — SIGNIFICANT CHANGE UP (ref 0.4–2)
BUN SERPL-MCNC: 57.5 MG/DL — HIGH (ref 8–20)
CALCIUM SERPL-MCNC: 9.2 MG/DL — SIGNIFICANT CHANGE UP (ref 8.6–10.2)
CHLORIDE SERPL-SCNC: 115 MMOL/L — HIGH (ref 98–107)
CO2 SERPL-SCNC: 24 MMOL/L — SIGNIFICANT CHANGE UP (ref 22–29)
CREAT SERPL-MCNC: 0.95 MG/DL — SIGNIFICANT CHANGE UP (ref 0.5–1.3)
GLUCOSE SERPL-MCNC: 124 MG/DL — HIGH (ref 70–99)
HCT VFR BLD CALC: 36 % — SIGNIFICANT CHANGE UP (ref 34.5–45)
HCT VFR BLD CALC: 38.8 % — SIGNIFICANT CHANGE UP (ref 34.5–45)
HGB BLD-MCNC: 11.5 G/DL — SIGNIFICANT CHANGE UP (ref 11.5–15.5)
HGB BLD-MCNC: 12.2 G/DL — SIGNIFICANT CHANGE UP (ref 11.5–15.5)
LACTATE SERPL-SCNC: 2 MMOL/L — SIGNIFICANT CHANGE UP (ref 0.5–2)
MAGNESIUM SERPL-MCNC: 2.5 MG/DL — SIGNIFICANT CHANGE UP (ref 1.6–2.6)
MCHC RBC-ENTMCNC: 27.1 PG — SIGNIFICANT CHANGE UP (ref 27–34)
MCHC RBC-ENTMCNC: 27.4 PG — SIGNIFICANT CHANGE UP (ref 27–34)
MCHC RBC-ENTMCNC: 31.4 GM/DL — LOW (ref 32–36)
MCHC RBC-ENTMCNC: 31.9 GM/DL — LOW (ref 32–36)
MCV RBC AUTO: 85.9 FL — SIGNIFICANT CHANGE UP (ref 80–100)
MCV RBC AUTO: 86 FL — SIGNIFICANT CHANGE UP (ref 80–100)
PHOSPHATE SERPL-MCNC: 2.4 MG/DL — SIGNIFICANT CHANGE UP (ref 2.4–4.7)
PLATELET # BLD AUTO: 193 K/UL — SIGNIFICANT CHANGE UP (ref 150–400)
PLATELET # BLD AUTO: 195 K/UL — SIGNIFICANT CHANGE UP (ref 150–400)
POTASSIUM SERPL-MCNC: 3.7 MMOL/L — SIGNIFICANT CHANGE UP (ref 3.5–5.3)
POTASSIUM SERPL-SCNC: 3.7 MMOL/L — SIGNIFICANT CHANGE UP (ref 3.5–5.3)
PROT SERPL-MCNC: 6.2 G/DL — LOW (ref 6.6–8.7)
RBC # BLD: 4.19 M/UL — SIGNIFICANT CHANGE UP (ref 3.8–5.2)
RBC # BLD: 4.51 M/UL — SIGNIFICANT CHANGE UP (ref 3.8–5.2)
RBC # FLD: 17.2 % — HIGH (ref 10.3–14.5)
RBC # FLD: 17.3 % — HIGH (ref 10.3–14.5)
SODIUM SERPL-SCNC: 151 MMOL/L — HIGH (ref 135–145)
WBC # BLD: 23.54 K/UL — HIGH (ref 3.8–10.5)
WBC # BLD: 23.9 K/UL — HIGH (ref 3.8–10.5)
WBC # FLD AUTO: 23.54 K/UL — HIGH (ref 3.8–10.5)
WBC # FLD AUTO: 23.9 K/UL — HIGH (ref 3.8–10.5)

## 2022-02-13 PROCEDURE — 99232 SBSQ HOSP IP/OBS MODERATE 35: CPT

## 2022-02-13 PROCEDURE — 99222 1ST HOSP IP/OBS MODERATE 55: CPT

## 2022-02-13 PROCEDURE — 99497 ADVNCD CARE PLAN 30 MIN: CPT | Mod: 25

## 2022-02-13 PROCEDURE — 70450 CT HEAD/BRAIN W/O DYE: CPT | Mod: 26

## 2022-02-13 PROCEDURE — 99223 1ST HOSP IP/OBS HIGH 75: CPT

## 2022-02-13 PROCEDURE — 99233 SBSQ HOSP IP/OBS HIGH 50: CPT

## 2022-02-13 RX ORDER — PANTOPRAZOLE SODIUM 20 MG/1
40 TABLET, DELAYED RELEASE ORAL DAILY
Refills: 0 | Status: DISCONTINUED | OUTPATIENT
Start: 2022-02-13 | End: 2022-02-13

## 2022-02-13 RX ORDER — POTASSIUM CHLORIDE 20 MEQ
10 PACKET (EA) ORAL
Refills: 0 | Status: COMPLETED | OUTPATIENT
Start: 2022-02-13 | End: 2022-02-13

## 2022-02-13 RX ORDER — HEPARIN SODIUM 5000 [USP'U]/ML
1900 INJECTION INTRAVENOUS; SUBCUTANEOUS
Qty: 25000 | Refills: 0 | Status: DISCONTINUED | OUTPATIENT
Start: 2022-02-13 | End: 2022-02-13

## 2022-02-13 RX ORDER — HEPARIN SODIUM 5000 [USP'U]/ML
1100 INJECTION INTRAVENOUS; SUBCUTANEOUS
Qty: 25000 | Refills: 0 | Status: DISCONTINUED | OUTPATIENT
Start: 2022-02-13 | End: 2022-02-15

## 2022-02-13 RX ORDER — SODIUM CHLORIDE 9 MG/ML
1000 INJECTION, SOLUTION INTRAVENOUS
Refills: 0 | Status: DISCONTINUED | OUTPATIENT
Start: 2022-02-13 | End: 2022-02-13

## 2022-02-13 RX ORDER — CEFAZOLIN SODIUM 1 G
2000 VIAL (EA) INJECTION EVERY 8 HOURS
Refills: 0 | Status: COMPLETED | OUTPATIENT
Start: 2022-02-13 | End: 2022-02-20

## 2022-02-13 RX ORDER — PANTOPRAZOLE SODIUM 20 MG/1
40 TABLET, DELAYED RELEASE ORAL EVERY 12 HOURS
Refills: 0 | Status: DISCONTINUED | OUTPATIENT
Start: 2022-02-13 | End: 2022-02-22

## 2022-02-13 RX ADMIN — PANTOPRAZOLE SODIUM 40 MILLIGRAM(S): 20 TABLET, DELAYED RELEASE ORAL at 12:15

## 2022-02-13 RX ADMIN — Medication 250 MILLIGRAM(S): at 03:03

## 2022-02-13 RX ADMIN — Medication 1000 MILLIGRAM(S): at 00:00

## 2022-02-13 RX ADMIN — Medication 30 MILLIGRAM(S): at 14:27

## 2022-02-13 RX ADMIN — SODIUM CHLORIDE 75 MILLILITER(S): 9 INJECTION, SOLUTION INTRAVENOUS at 03:03

## 2022-02-13 RX ADMIN — Medication 100 MILLIEQUIVALENT(S): at 14:27

## 2022-02-13 RX ADMIN — Medication 100 MILLIGRAM(S): at 21:13

## 2022-02-13 RX ADMIN — Medication 100 MILLIEQUIVALENT(S): at 12:16

## 2022-02-13 RX ADMIN — SODIUM CHLORIDE 100 MILLILITER(S): 9 INJECTION, SOLUTION INTRAVENOUS at 06:45

## 2022-02-13 RX ADMIN — Medication 100 MILLIEQUIVALENT(S): at 13:32

## 2022-02-13 RX ADMIN — Medication 1 APPLICATION(S): at 22:12

## 2022-02-13 RX ADMIN — Medication 30 MILLIGRAM(S): at 02:55

## 2022-02-13 RX ADMIN — PANTOPRAZOLE SODIUM 40 MILLIGRAM(S): 20 TABLET, DELAYED RELEASE ORAL at 21:13

## 2022-02-13 RX ADMIN — Medication 30 MILLIGRAM(S): at 21:13

## 2022-02-13 RX ADMIN — HEPARIN SODIUM 1100 UNIT(S)/HR: 5000 INJECTION INTRAVENOUS; SUBCUTANEOUS at 12:31

## 2022-02-13 RX ADMIN — Medication 30 MILLIGRAM(S): at 08:31

## 2022-02-13 RX ADMIN — HEPARIN SODIUM 1700 UNIT(S)/HR: 5000 INJECTION INTRAVENOUS; SUBCUTANEOUS at 05:33
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
## 2022-02-13 NOTE — PROGRESS NOTE ADULT - SUBJECTIVE AND OBJECTIVE BOX
Patient seen and examined. Currently opens eyes to voice, not following commands.  In restraints Exam limited 2/2 to patient current mental status.    Vital Signs Last 24 Hrs  T(C): 37 (13 Feb 2022 08:00), Max: 37.1 (13 Feb 2022 00:00)  T(F): 98.6 (13 Feb 2022 08:00), Max: 98.8 (13 Feb 2022 04:00)  HR: 127 (13 Feb 2022 09:00) (88 - 128)  BP: 98/80 (13 Feb 2022 09:00) (98/80 - 146/64)  BP(mean): 87 (13 Feb 2022 09:00) (71 - 115)  RR: 19 (13 Feb 2022 09:00) (13 - 20)  SpO2: 99% (13 Feb 2022 09:00) (95% - 99%)                                           12.2   23.90 )-----------( 195      ( 13 Feb 2022 04:46 )             38.8   02-13    151<H>  |  115<H>  |  57.5<H>  ----------------------------<  124<H>  3.7   |  24.0  |  0.95    Ca    9.2      13 Feb 2022 04:46  Phos  2.4     02-13  Mg     2.5     02-13    TPro  6.2<L>  /  Alb  3.0<L>  /  TBili  0.7  /  DBili  x   /  AST  64<H>  /  ALT  71<H>  /  AlkPhos  166<H>  02-13      Physical exam:  Currently in c-collar, head turned to left  Motor exam: Cannot assess due to mental status (moving left upper and lower extremity spontaneously)  Sensory: cannot assess  Vasc: Warm and perfused x 4 extremities    Assessment:  * C-collar  * Pain control  * MRI pending  * No surgical intervention required at this time

## 2022-02-13 NOTE — CONSULT NOTE ADULT - CONVERSATION DETAILS
pt's sister wakes up around 2:30 PM as per jeanne Montero Writer discussed pt's diahnosis, explained poor prognosis and extent of CVA. Kylah showed good insight into medical condition. Kylah informed that pt's sister Jocy is HCP. She usually wakes up around 2:30-3 pm. She noted that Jocy/HCP is in extreme denial and believes that pt is going to be completely cured and wouldn't hear anything different. Plan is to reach out to Jocy/HCP for GOC discussion.    Due to patient's health status and restrictions on visitations during this Public health emergency, advanced care planning discussion was performed via telephone. Writer discussed pt's diagnosis, explained poor prognosis and extent of CVA. Kylah showed good insight into medical condition. Kylah informed that pt's sister Jocy is HCP. She usually wakes up around 2:30-3 pm. She noted that Jocy/HCP is in extreme denial and believes that pt is going to be completely cured and wouldn't hear anything different. Plan is to reach out to Jocy/HCP for GOC discussion.    Due to patient's health status and restrictions on visitations during this Public health emergency, advanced care planning discussion was performed via telephone.
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
## 2022-02-13 NOTE — CONSULT NOTE ADULT - TIME BILLING
Time spent on total encounter assessing patient, examination, chart review, counseling and coordinating care by the attending physician/nurse/care manager as well as GOC discussion.
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
## 2022-02-14 ENCOUNTER — TRANSCRIPTION ENCOUNTER (OUTPATIENT)
Age: 67
End: 2022-02-14

## 2022-02-14 DIAGNOSIS — K92.2 GASTROINTESTINAL HEMORRHAGE, UNSPECIFIED: ICD-10-CM

## 2022-02-14 LAB
-  AMIKACIN: SIGNIFICANT CHANGE UP
-  AMOXICILLIN/CLAVULANIC ACID: SIGNIFICANT CHANGE UP
-  AMPICILLIN/SULBACTAM: SIGNIFICANT CHANGE UP
-  AMPICILLIN: SIGNIFICANT CHANGE UP
-  AZTREONAM: SIGNIFICANT CHANGE UP
-  CEFAZOLIN: SIGNIFICANT CHANGE UP
-  CEFEPIME: SIGNIFICANT CHANGE UP
-  CEFOXITIN: SIGNIFICANT CHANGE UP
-  CEFTRIAXONE: SIGNIFICANT CHANGE UP
-  CIPROFLOXACIN: SIGNIFICANT CHANGE UP
-  CLINDAMYCIN: SIGNIFICANT CHANGE UP
-  CLINDAMYCIN: SIGNIFICANT CHANGE UP
-  ERTAPENEM: SIGNIFICANT CHANGE UP
-  ERYTHROMYCIN: SIGNIFICANT CHANGE UP
-  ERYTHROMYCIN: SIGNIFICANT CHANGE UP
-  GENTAMICIN: SIGNIFICANT CHANGE UP
-  IMIPENEM: SIGNIFICANT CHANGE UP
-  LEVOFLOXACIN: SIGNIFICANT CHANGE UP
-  MEROPENEM: SIGNIFICANT CHANGE UP
-  NITROFURANTOIN: SIGNIFICANT CHANGE UP
-  OXACILLIN: SIGNIFICANT CHANGE UP
-  OXACILLIN: SIGNIFICANT CHANGE UP
-  PENICILLIN: SIGNIFICANT CHANGE UP
-  PENICILLIN: SIGNIFICANT CHANGE UP
-  PIPERACILLIN/TAZOBACTAM: SIGNIFICANT CHANGE UP
-  RIFAMPIN: SIGNIFICANT CHANGE UP
-  RIFAMPIN: SIGNIFICANT CHANGE UP
-  TETRACYCLINE: SIGNIFICANT CHANGE UP
-  TETRACYCLINE: SIGNIFICANT CHANGE UP
-  TIGECYCLINE: SIGNIFICANT CHANGE UP
-  TOBRAMYCIN: SIGNIFICANT CHANGE UP
-  TRIMETHOPRIM/SULFAMETHOXAZOLE: SIGNIFICANT CHANGE UP
-  VANCOMYCIN: SIGNIFICANT CHANGE UP
-  VANCOMYCIN: SIGNIFICANT CHANGE UP
ANION GAP SERPL CALC-SCNC: 13 MMOL/L — SIGNIFICANT CHANGE UP (ref 5–17)
APTT BLD: 21.7 SEC — LOW (ref 27.5–35.5)
APTT BLD: 29.5 SEC — SIGNIFICANT CHANGE UP (ref 27.5–35.5)
BUN SERPL-MCNC: 45.9 MG/DL — HIGH (ref 8–20)
CALCIUM SERPL-MCNC: 9.1 MG/DL — SIGNIFICANT CHANGE UP (ref 8.6–10.2)
CHLORIDE SERPL-SCNC: 115 MMOL/L — HIGH (ref 98–107)
CO2 SERPL-SCNC: 25 MMOL/L — SIGNIFICANT CHANGE UP (ref 22–29)
CREAT SERPL-MCNC: 0.8 MG/DL — SIGNIFICANT CHANGE UP (ref 0.5–1.3)
CULTURE RESULTS: SIGNIFICANT CHANGE UP
GLUCOSE SERPL-MCNC: 97 MG/DL — SIGNIFICANT CHANGE UP (ref 70–99)
HCT VFR BLD CALC: 30.7 % — LOW (ref 34.5–45)
HCT VFR BLD CALC: 31.7 % — LOW (ref 34.5–45)
HCV AB S/CO SERPL IA: 0.1 S/CO — SIGNIFICANT CHANGE UP (ref 0–0.99)
HCV AB SERPL-IMP: SIGNIFICANT CHANGE UP
HGB BLD-MCNC: 10 G/DL — LOW (ref 11.5–15.5)
HGB BLD-MCNC: 9.7 G/DL — LOW (ref 11.5–15.5)
LACTATE SERPL-SCNC: 3.3 MMOL/L — HIGH (ref 0.5–2)
MAGNESIUM SERPL-MCNC: 2.6 MG/DL — SIGNIFICANT CHANGE UP (ref 1.6–2.6)
MCHC RBC-ENTMCNC: 27.2 PG — SIGNIFICANT CHANGE UP (ref 27–34)
MCHC RBC-ENTMCNC: 27.5 PG — SIGNIFICANT CHANGE UP (ref 27–34)
MCHC RBC-ENTMCNC: 31.5 GM/DL — LOW (ref 32–36)
MCHC RBC-ENTMCNC: 31.6 GM/DL — LOW (ref 32–36)
MCV RBC AUTO: 86.1 FL — SIGNIFICANT CHANGE UP (ref 80–100)
MCV RBC AUTO: 87 FL — SIGNIFICANT CHANGE UP (ref 80–100)
METHOD TYPE: SIGNIFICANT CHANGE UP
NRBC # BLD: 1 /100 WBCS — HIGH (ref 0–0)
NRBC # BLD: 2 /100 WBCS — HIGH (ref 0–0)
ORGANISM # SPEC MICROSCOPIC CNT: SIGNIFICANT CHANGE UP
PHOSPHATE SERPL-MCNC: 2.5 MG/DL — SIGNIFICANT CHANGE UP (ref 2.4–4.7)
PLATELET # BLD AUTO: 155 K/UL — SIGNIFICANT CHANGE UP (ref 150–400)
PLATELET # BLD AUTO: 187 K/UL — SIGNIFICANT CHANGE UP (ref 150–400)
POTASSIUM SERPL-MCNC: 4.6 MMOL/L — SIGNIFICANT CHANGE UP (ref 3.5–5.3)
POTASSIUM SERPL-SCNC: 4.6 MMOL/L — SIGNIFICANT CHANGE UP (ref 3.5–5.3)
RBC # BLD: 3.53 M/UL — LOW (ref 3.8–5.2)
RBC # BLD: 3.68 M/UL — LOW (ref 3.8–5.2)
RBC # FLD: 17.3 % — HIGH (ref 10.3–14.5)
RBC # FLD: 17.7 % — HIGH (ref 10.3–14.5)
SODIUM SERPL-SCNC: 153 MMOL/L — HIGH (ref 135–145)
SPECIMEN SOURCE: SIGNIFICANT CHANGE UP
VANCOMYCIN TROUGH SERPL-MCNC: 6.8 UG/ML — LOW (ref 10–20)
WBC # BLD: 22.66 K/UL — HIGH (ref 3.8–10.5)
WBC # BLD: 22.79 K/UL — HIGH (ref 3.8–10.5)
WBC # FLD AUTO: 22.66 K/UL — HIGH (ref 3.8–10.5)
WBC # FLD AUTO: 22.79 K/UL — HIGH (ref 3.8–10.5)

## 2022-02-14 PROCEDURE — 99233 SBSQ HOSP IP/OBS HIGH 50: CPT

## 2022-02-14 PROCEDURE — 99223 1ST HOSP IP/OBS HIGH 75: CPT

## 2022-02-14 PROCEDURE — 99232 SBSQ HOSP IP/OBS MODERATE 35: CPT

## 2022-02-14 RX ORDER — FENTANYL CITRATE 50 UG/ML
100 INJECTION INTRAVENOUS ONCE
Refills: 0 | Status: DISCONTINUED | OUTPATIENT
Start: 2022-02-14 | End: 2022-02-14

## 2022-02-14 RX ORDER — DILTIAZEM HCL 120 MG
60 CAPSULE, EXT RELEASE 24 HR ORAL EVERY 6 HOURS
Refills: 0 | Status: DISCONTINUED | OUTPATIENT
Start: 2022-02-14 | End: 2022-02-22

## 2022-02-14 RX ORDER — METOPROLOL TARTRATE 50 MG
12.5 TABLET ORAL EVERY 12 HOURS
Refills: 0 | Status: DISCONTINUED | OUTPATIENT
Start: 2022-02-14 | End: 2022-02-17

## 2022-02-14 RX ORDER — SODIUM CHLORIDE 9 MG/ML
1000 INJECTION, SOLUTION INTRAVENOUS ONCE
Refills: 0 | Status: COMPLETED | OUTPATIENT
Start: 2022-02-14 | End: 2022-02-14

## 2022-02-14 RX ORDER — DILTIAZEM HCL 120 MG
30 CAPSULE, EXT RELEASE 24 HR ORAL ONCE
Refills: 0 | Status: COMPLETED | OUTPATIENT
Start: 2022-02-14 | End: 2022-02-14

## 2022-02-14 RX ORDER — ACETAMINOPHEN 500 MG
1000 TABLET ORAL ONCE
Refills: 0 | Status: COMPLETED | OUTPATIENT
Start: 2022-02-14 | End: 2022-02-14

## 2022-02-14 RX ORDER — SODIUM CHLORIDE 9 MG/ML
500 INJECTION, SOLUTION INTRAVENOUS ONCE
Refills: 0 | Status: COMPLETED | OUTPATIENT
Start: 2022-02-14 | End: 2022-02-14

## 2022-02-14 RX ADMIN — PANTOPRAZOLE SODIUM 40 MILLIGRAM(S): 20 TABLET, DELAYED RELEASE ORAL at 17:16

## 2022-02-14 RX ADMIN — SODIUM CHLORIDE 8.33 MILLILITER(S): 9 INJECTION, SOLUTION INTRAVENOUS at 01:05

## 2022-02-14 RX ADMIN — Medication 1000 MILLIGRAM(S): at 02:00

## 2022-02-14 RX ADMIN — Medication 1 APPLICATION(S): at 05:55

## 2022-02-14 RX ADMIN — PANTOPRAZOLE SODIUM 40 MILLIGRAM(S): 20 TABLET, DELAYED RELEASE ORAL at 05:56

## 2022-02-14 RX ADMIN — Medication 100 MILLIGRAM(S): at 22:06

## 2022-02-14 RX ADMIN — HEPARIN SODIUM 1600 UNIT(S)/HR: 5000 INJECTION INTRAVENOUS; SUBCUTANEOUS at 23:14

## 2022-02-14 RX ADMIN — Medication 30 MILLIGRAM(S): at 09:03

## 2022-02-14 RX ADMIN — Medication 1 APPLICATION(S): at 17:16

## 2022-02-14 RX ADMIN — Medication 100 MILLIGRAM(S): at 05:55

## 2022-02-14 RX ADMIN — Medication 100 MILLIGRAM(S): at 15:26

## 2022-02-14 RX ADMIN — SODIUM CHLORIDE 2000 MILLILITER(S): 9 INJECTION, SOLUTION INTRAVENOUS at 18:15

## 2022-02-14 RX ADMIN — Medication 400 MILLIGRAM(S): at 01:11

## 2022-02-14 RX ADMIN — Medication 30 MILLIGRAM(S): at 09:21

## 2022-02-14 RX ADMIN — Medication 30 MILLIGRAM(S): at 03:20

## 2022-02-14 NOTE — CONSULT NOTE ADULT - PROBLEM SELECTOR RECOMMENDATION 9
Dark stool and GI bleeding via NGT of unclear etiology and now resolved after heparin drip was stopped. Patient had a BM while performing ASHISH- nonbloody dark brown stool. NGT in place w/ nonbloody gastric content output. Hemoglobin stable at 10.0.    - Continue to trend CBC, transfuse for a hgb of 8 or higher.    - Continue to hold Heparin drip until GI bleed fully resolves.    - IV PPI BID
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
## 2022-02-14 NOTE — DIETITIAN INITIAL EVALUATION ADULT. - PERTINENT LABORATORY DATA
02-14 Na153 mmol/L<H> Glu 97 mg/dL K+ 4.6 mmol/L Cr  0.80 mg/dL BUN 45.9 mg/dL<H> Phos 2.5 mg/dL Alb n/a   PAB n/a     n/a  HgbA1C

## 2022-02-14 NOTE — CONSULT NOTE ADULT - ATTENDING COMMENTS
I evaluated this pt. with my NP and agree with the above assessment and management plan. No active bleeding. Hold IV Heparin today. Trickle tube feeds as tolerated. Same IV Pantoprazole Rx. Repeat labs ordered for the AM. Pt with soft stool impaction on ASHISH w/o blood or melena.

## 2022-02-14 NOTE — DISCHARGE NOTE NURSING/CASE MANAGEMENT/SOCIAL WORK - PATIENT PORTAL LINK FT
You can access the FollowMyHealth Patient Portal offered by [facility] by registering at the following website: [email or web address]. By joining [employer]’s FollowMyHealth portal, you will also be able to view your health information using other applications (apps) compatible with our system.
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
## 2022-02-14 NOTE — PROGRESS NOTE ADULT - SUBJECTIVE AND OBJECTIVE BOX
MALU ARENAS  [id number]      Chief Complaint:  ?Syncope/AF/CVA/LA Thrombus    Subjective:   Pt seen and examined, appears comfortable NAD. Speech in incoherent.     Tele:  Afib, mild RVR        ceFAZolin   IVPB 2000 milliGRAM(s) IV Intermittent every 8 hours  diltiazem    Tablet 60 milliGRAM(s) Oral every 6 hours  heparin  Infusion. 1100 Unit(s)/Hr IV Continuous <Continuous>  pantoprazole  Injectable 40 milliGRAM(s) IV Push every 12 hours  petrolatum Ophthalmic Ointment 1 Application(s) Both EYES two times a day          Physical Exam:  T(C): 37.3 (02-14-22 @ 11:43), Max: 37.5 (02-14-22 @ 00:00)  HR: 101 (02-14-22 @ 12:00) (101 - 135)  BP: 109/70 (02-14-22 @ 12:00) (80/45 - 138/114)  RR: 14 (02-14-22 @ 12:00) (14 - 20)  SpO2: 99% (02-14-22 @ 12:00) (97% - 100%)  Wt(kg): --  General: Comfortable in NAD  Neck: No JVD  CVS: nl s1s2, no s3, irregularly irregular  Pulm: CTA b/l, diminished, poor effort  Abd: soft, non-tender, obese  Ext: No c/c/ trace LE edema  Neuro Arousable to voice  Psych: calm      Labs:   14 Feb 2022 04:46    153    |  115    |  45.9   ----------------------------<  97     4.6     |  25.0   |  0.80     Ca    9.1        14 Feb 2022 04:46  Phos  2.5       14 Feb 2022 04:46  Mg     2.6       14 Feb 2022 04:46    TPro  6.2    /  Alb  3.0    /  TBili  0.7    /  DBili  x      /  AST  64     /  ALT  71     /  AlkPhos  166    13 Feb 2022 04:46                          10.0   22.66 )-----------( 155      ( 14 Feb 2022 06:11 )             31.7     PTT - ( 14 Feb 2022 06:11 )  PTT:29.5 sec      CT C/A/P:  Large volume of thrombus within the cardiac left atrial appendage.  Nondisplaced fracture of anterior aspect right fifth rib. No pneumothorax.  No imaging evidence of acute traumatic injury involving the abdomen or pelvis.  Large periumbilical hernia contains transverse colon without bowel obstruction.  Possible thickened endometrium.      Echo:   1. Endocardial visualization was enhanced with intravenous echo contrast.   2. Left ventricular ejection fraction, by visual estimation, is 60 to 65%.   3. Normal global left ventricular systolic function.   4. The mitral in-flow pattern reveals no discernable A-wave, therefore no comment on diastolic function can be made.   5. Mild thickening of the anterior and posterior mitral valve leaflets.   6. Trace mitral valve regurgitation.   7. Mild tricuspid regurgitation.   8. Peak transaortic gradient equals 10.4 mmHg, mean transaortic gradient equals 5.0 mmHg, the calculated aortic valve area equals 1.40 cm² by the continuity equation consistent with moderate aortic stenosis. The Dimensionless Index is 0.58.        Assessment:  66 year old female PMHx Schizophrenia, AF s/p CVA previously on Xarelto, HTN, HLD, COPD presented to ED after being found down at home.  Found to have subacute L MCA and R PCA infarcts.  In ER noted to be in AF with RVR requiring Diltiazem infusion in ED.  Plan was initially for cervical spine surgery but decided against.  CTC with LA thrombus.  Neuro decided can start A/C b/c CVA appears subacute.  Also no plan for surgery.  HR better with Cardizem gtt.  AF RVR likely 2/2 missing Toprol in addition to CVA and fall.  Does not appear to be in significant CHF at this time or ACS.  -Now off Cardizem gtt and just on po with rates mildly elevated but within reason.  -Echo with normal EF and no significant VHD.  Likely mild AS noted with DI>0.50.  -Pt still with mildly elevated rate therefore Cardizem was increased this morning.     Plan:  1. Supportive care per ICU.  2. Cardizem increased to 60 mg every 6 hours, continue to monitor HR for goal ~110.   3. Heparin as per neuro is ok.  Continue gtt.  Eventual transition back to Xarelto.  4. Would restart Toprol at home dose.  5. No additional CV w/u now.  6. Ortho, neuro, NS f/u.  7. Eventual OT/PT/sp    Will follow.  Thanks!     MALU ARENAS  [id number]      Chief Complaint:  ?Syncope/AF/CVA/LA Thrombus    Subjective:   Pt seen and examined, appears comfortable NAD. Speech in incoherent.     Tele:  Afib, mild RVR        ceFAZolin   IVPB 2000 milliGRAM(s) IV Intermittent every 8 hours  diltiazem    Tablet 60 milliGRAM(s) Oral every 6 hours  heparin  Infusion. 1100 Unit(s)/Hr IV Continuous <Continuous>  pantoprazole  Injectable 40 milliGRAM(s) IV Push every 12 hours  petrolatum Ophthalmic Ointment 1 Application(s) Both EYES two times a day          Physical Exam:  T(C): 37.3 (02-14-22 @ 11:43), Max: 37.5 (02-14-22 @ 00:00)  HR: 101 (02-14-22 @ 12:00) (101 - 135)  BP: 109/70 (02-14-22 @ 12:00) (80/45 - 138/114)  RR: 14 (02-14-22 @ 12:00) (14 - 20)  SpO2: 99% (02-14-22 @ 12:00) (97% - 100%)  Wt(kg): --  General: Comfortable in NAD  Neck: No JVD  CVS: nl s1s2, no s3, irregularly irregular  Pulm: CTA b/l, diminished, poor effort  Abd: soft, non-tender, obese  Ext: No c/c/ trace LE edema  Neuro Arousable to voice  Psych: calm      Labs:   14 Feb 2022 04:46    153    |  115    |  45.9   ----------------------------<  97     4.6     |  25.0   |  0.80     Ca    9.1        14 Feb 2022 04:46  Phos  2.5       14 Feb 2022 04:46  Mg     2.6       14 Feb 2022 04:46    TPro  6.2    /  Alb  3.0    /  TBili  0.7    /  DBili  x      /  AST  64     /  ALT  71     /  AlkPhos  166    13 Feb 2022 04:46                          10.0   22.66 )-----------( 155      ( 14 Feb 2022 06:11 )             31.7     PTT - ( 14 Feb 2022 06:11 )  PTT:29.5 sec      CT C/A/P:  Large volume of thrombus within the cardiac left atrial appendage.  Nondisplaced fracture of anterior aspect right fifth rib. No pneumothorax.  No imaging evidence of acute traumatic injury involving the abdomen or pelvis.  Large periumbilical hernia contains transverse colon without bowel obstruction.  Possible thickened endometrium.      Echo:   1. Endocardial visualization was enhanced with intravenous echo contrast.   2. Left ventricular ejection fraction, by visual estimation, is 60 to 65%.   3. Normal global left ventricular systolic function.   4. The mitral in-flow pattern reveals no discernable A-wave, therefore no comment on diastolic function can be made.   5. Mild thickening of the anterior and posterior mitral valve leaflets.   6. Trace mitral valve regurgitation.   7. Mild tricuspid regurgitation.   8. Peak transaortic gradient equals 10.4 mmHg, mean transaortic gradient equals 5.0 mmHg, the calculated aortic valve area equals 1.40 cm² by the continuity equation consistent with moderate aortic stenosis. The Dimensionless Index is 0.58.        Assessment:  66 year old female PMHx Schizophrenia, AF s/p CVA previously on Xarelto, HTN, HLD, COPD presented to ED after being found down at home.  Found to have subacute L MCA and R PCA infarcts.  In ER noted to be in AF with RVR requiring Diltiazem infusion in ED.  Plan was initially for cervical spine surgery but decided against.  CTC with LA thrombus.  Neuro decided can start A/C b/c CVA appears subacute.  Also no plan for surgery.  HR better with Cardizem gtt.  AF RVR likely 2/2 missing Toprol in addition to CVA and fall.  Does not appear to be in significant CHF at this time or ACS.  -Now off Cardizem gtt and just on po with rates mildly elevated but within reason.  -Echo with normal EF and no significant VHD.  Likely mild AS noted with DI>0.50.  -Pt still with mildly elevated rate therefore Cardizem was increased this morning.     Plan:  1. Supportive care per ICU.  2. Cardizem increased to 60 mg every 6 hours, continue to monitor HR for goal ~110.  HR may not be at goal due to agitation, doubtful that further increments of dose will translate in a controlled HR.   3. Heparin as per neuro is ok.  Continue gtt.  Eventual transition back to Xarelto.  4. Would restart Toprol at home dose.  5. No additional CV w/u now.  6. Ortho, neuro, NS f/u.  7. Eventual OT/PT/sp    Will follow.  Thanks!
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
## 2022-02-14 NOTE — DIETITIAN INITIAL EVALUATION ADULT. - ENTERAL
Consider changing enteral formula to Pivot @ 60ml/hr to better meet estimated nutrition needs and promote healing

## 2022-02-14 NOTE — DISCHARGE NOTE NURSING/CASE MANAGEMENT/SOCIAL WORK - NSDCPEFALRISK_GEN_ALL_CORE
For information on Fall & Injury Prevention, visit: [email or web address] OR  [email or web address] OR  https://www.cdc.gov/steadi/patient.html

## 2022-02-14 NOTE — PROGRESS NOTE ADULT - SUBJECTIVE AND OBJECTIVE BOX
[facility]                                                INFECTIOUS DISEASES  =======================================================                               Ulices Muñoz MD#  Clifford Jackson MD*                                     Lane Vargas MD*    Veronica Mccracken MD*            Diplomates American Board of Internal Medicine & Infectious Diseases                  # [place] Office - Appt - Tel  [phone] Fax [phone]                * [place] Office - Appt - Tel [phone] Fax [phone]                                  Hospital Consult line:  [phone]  =======================================================      [id number]  MAL[facility]  follow up: bacteremia,  bacteriuria, skin wounds     urine cx positive for E coli  cultures from blood reviewed.         I have personally reviewed the labs and data; pertinent labs and data are listed in this note; please see below.   =======================================================  Past Medical & Surgical Hx:  =====================  PAST MEDICAL & SURGICAL HISTORY:    Problem List:  ==========  HEALTH ISSUES - PROBLEM Dx:        Social Hx:  =======  no toxic habits currently    FAMILY HISTORY:  no significant family history of immunosuppressive disorders in mother or father   =======================================================    REVIEW OF SYSTEMS:  Limited due to medical condition      =======================================================  Allergies  No Known Allergies         ======================================================  Physical Exam:  ============    General:  No acute distress.  Obese  Eye: Pupils are equal, round and reactive to light, Extraocular movements are intact, Normal conjunctiva.  HENT: Normocephalic, Oral mucosa is dry  NGT in place  + CERVICAL COLLAR  Neck: Supple, No lymphadenopathy.  Respiratory: Lungs with fair aeration  Cardiovascular: Normal rate, Regular rhythm,   Gastrointestinal: Soft, Non-tender, Non-distended, Normal bowel sounds.  Genitourinary: No costovertebral angle tenderness.  Lymphatics: No lymphadenopathy neck,   Musculoskeletal: Normal range of motion, Normal strength.  BILATERAL WRIST RESTRAINT  Integumentary:   MULTIPLE skin ABRASIONS AND PRESSURE ULCERS ON BILATERAL THIGHS.  dressing in place  Neurologic:  Limited due to medical condition   =======================================================      Vitals:  ============  T(F): 99.1 (14 Feb 2022 11:43), Max: 99.5 (14 Feb 2022 00:00)  HR: 108 (14 Feb 2022 13:00)  BP: 101/59 (14 Feb 2022 13:00)  RR: 16 (14 Feb 2022 13:00)  SpO2: 100% (14 Feb 2022 13:00) (97% - 100%)  temp max in last 48H T(F): , Max: 99.5 (02-14-22 @ 00:00)    =======================================================  Current Antibiotics:  ceFAZolin   IVPB 2000 milliGRAM(s) IV Intermittent every 8 hours    Other medications:  diltiazem    Tablet 60 milliGRAM(s) Oral every 6 hours  heparin  Infusion. 1100 Unit(s)/Hr IV Continuous <Continuous>  metoprolol tartrate 12.5 milliGRAM(s) Oral every 12 hours  pantoprazole  Injectable 40 milliGRAM(s) IV Push every 12 hours  petrolatum Ophthalmic Ointment 1 Application(s) Both EYES two times a day      =======================================================  Labs:                        10.0   22.66 )-----------( 155      ( 14 Feb 2022 06:11 )             31.7     02-14    153<H>  |  115<H>  |  45.9<H>  ----------------------------<  97  4.6   |  25.0  |  0.80    Ca    9.1      14 Feb 2022 04:46  Phos  2.5     02-14  Mg     2.6     02-14    TPro  6.2<L>  /  Alb  3.0<L>  /  TBili  0.7  /  DBili  x   /  AST  64<H>  /  ALT  71<H>  /  AlkPhos  166<H>  02-13      Culture - Urine (02.11.22 @ 22:15)    Specimen Source: Clean Catch Clean Catch (Midstream)    Culture Results:   >100,000 CFU/ml Escherichia coli        Culture - Blood (collected 02-11-22 @ 15:28)  Source: .Blood Blood-Peripheral  Gram Stain (02-12-22 @ 10:18):    Growth in aerobic and anaerobic bottles: Gram Positive Cocci in Clusters    Gram Stain performed by:    [facility]    [address] [place] [id number]    .    TYPE: (C=Critical, N=Notification, A=Abnormal) C    TESTS:  _GS    DATE/TIME CALLED: _ 02/12/2022 10:17:53    CALLED TO: _ Christiano Caputo RN    READ BACK (2 Patient Identifiers)(Y/N): _ Y    READ BACK VALUES (Y/N): _ Y    CALLED BY: Nani Gale  Final Report (02-14-22 @ 13:22):    Growth in aerobic and anaerobic bottles: Staphylococcus epidermidis  Organism: Staphylococcus epidermidis  Staphylococcus epidermidis (02-14-22 @ 13:22)  Organism: Staphylococcus epidermidis (02-14-22 @ 13:22)    Sensitivities:      -  Ampicillin/Sulbactam: S <=8/4      -  Cefazolin: S <=4      -  Clindamycin: S <=0.25      -  Erythromycin: R >4      -  Gentamicin: S <=1 Should not be used as monotherapy      -  Oxacillin: S <=0.25      -  Penicillin: R 4      -  Rifampin: S <=1 Should not be used as monotherapy      -  Tetra/Doxy: S <=1      -  Trimethoprim/Sulfamethoxazole: S <=0.5/9.5      -  Vancomycin: S 2      Method Type: SKY  Organism: Staphylococcus epidermidis (02-14-22 @ 13:22)    Sensitivities:      -  Ampicillin/Sulbactam: S <=8/4      -  Cefazolin: S <=4      -  Clindamycin: R <=0.25 This isolate is presumed to be clindamycin resistant based on detection of inducible resistance. Clindamycin may still be effective in some patients.      -  Erythromycin: R >4      -  Gentamicin: S <=1 Should not be used as monotherapy      -  Oxacillin: S <=0.25      -  Penicillin: R 4      -  Rifampin: S <=1 Should not be used as monotherapy      -  Tetra/Doxy: S <=1      -  Trimethoprim/Sulfamethoxazole: S <=0.5/9.5      -  Vancomycin: S 2      Method Type: SKY    Culture - Blood (collected 02-11-22 @ 15:27)  Source: .Blood Blood-Peripheral  Gram Stain (02-12-22 @ 13:47):    Growth in aerobic and anaerobic bottles: Gram Positive Cocci in Clusters    ***Blood Panel PCR results on this specimen are available    approximately 3 hours after the Gram stain result.***    Gram stain, PCR, and/or culture results may not always    correspond due to difference in methodologies.    ************************************************************    This PCR assay was performed using [employer].    The following targets are tested for: Enterococcus,    vancomycin resistant enterococci, Listeria monocytogenes,    coagulase negative staphylococci, S. aureus,    methicillin resistant S. aureus, Streptococcus agalactiae    (Group B), S. pneumoniae, S. pyogenes (Group A),    Acinetobacter baumannii, Enterobacter cloacae, E. coli,    Klebsiella oxytoca, K. pneumoniae, Proteus sp.,    Serratia marcescens, Haemophilus influenzae,    Neisseria meningitidis, Pseudomonas aeruginosa, Candida    albicans, C. glabrata, C krusei, C parapsilosis,    C. tropicalis and the KPC resistance gene.    Gram Stain and BCID performed by:    [facility]    [address] [place] [address]    .    TYPE: (C=Critical, N=Notification, A=Abnormal) C    TESTS:  _ GS    DATE/TIME CALLED: _ 02/12/2022 07:33:46    CALLED TO: Nani Caputo RN    READ BACK (2Patient Identifiers)(Y/N): _ Y    READ BACK VALUES (Y/N): _ Y    CALLED BY: Nani Gale  Final Report (02-14-22 @ 13:23):    Growth in aerobic and anaerobic bottles: Staphylococcus epidermidis    See previous culture [id number]  Organism: Blood Culture PCR (02-14-22 @ 13:23)  Organism: Blood Culture PCR (02-14-22 @ 13:23)    Sensitivities:      -  Coagulase negative Staphylococcus: Detec      Method Type: PCR           SARS-CoV-2: NotDetec (02-11-22 @ 15:23)      =======================================================    < from: CT Chest w/ IV Cont (02.11.22 @ 17:30) >    ACC: [id number] EXAM:  CT CHEST IC                        ACC: [id number] EXAM:  CT ABDOMEN AND PELVIS IC                          PROCEDURE DATE:  02/11/2022          INTERPRETATION:  CLINICAL INFORMATION: Altered mental status, Trauma    COMPARISON: No prior examinations are available for comparison at the   time of this interpretation.    CONTRAST/COMPLICATIONS:  IV Contrast: IV contrast documented in associated exam (accession   [id number]), Omnipaque 300 (accession [id number])  92 cc administered 8 cc   discarded  Oral Contrast: NONE  Complications: None reported at time of study completion    PROCEDURE:  CT of the Chest, Abdomen and Pelvis was performed.  Imaging was performed through the chest in the arterial phase followed by   imaging ofthe abdomen and pelvis in the portal venous phase.  Sagittal and coronal reformats were performed.    FINDINGS: Some images are degraded by artifacts from the patient's arms   within the scanning field of view. Motion artifacts degrade some of the   imaging.    CHEST:  LUNGS AND LARGE AIRWAYS: Patent central airways. Mild dependent   atelectasis. Right upper lobe and right middle lobe subsegmental   atelectasis.  PLEURA: No pleural effusion. Right diaphragmatic calcification  VESSELS: Enlarged pulmonary arteries.  HEART: Cardiomegaly. No pericardial effusion. Coronary artery   calcification. Large volume thrombus fills the left atrial appendage  MEDIASTINUM AND DILLON: No lymphadenopathy.  CHEST WALL AND LOWER NECK: Within normal limits.    ABDOMEN AND PELVIS:  LIVER: Within normal limits.  BILE DUCTS: Normal caliber.  GALLBLADDER: Cholelithiasis.  SPLEEN: Diminutive  PANCREAS: Within normal limits.  ADRENALS: Within normal limits.  KIDNEYS/URETERS: Left upper and lower pole cortical scarring. Symmetric   renal enhancement without hydronephrosis.    BLADDER: Decompressed around Hernandez catheter  REPRODUCTIVE ORGANS: Endometrium may be thickened measuring up to 1.4 cm    BOWEL: Transverse colon extends into a large periumbilical hernia without   bowel obstruction Appendix is normal. Colonic diverticulosis, greatest   involving the sigmoid colon  PERITONEUM: No ascites.  VESSELS: Atherosclerotic changes.  RETROPERITONEUM/LYMPH NODES: No lymphadenopathy.  ABDOMINAL WALL: Periumbilical hernia contains omental fat and transverse   colon with an immediately more superior small omental fat-containing   hernia  BONES: Multilevel degenerative changes throughout the spine. Nondisplaced   fracture of the anterior aspect right fifth rib    IMPRESSION:  Large volume of thrombus within the cardiac left atrial appendage.    Nondisplaced fracture of anterior aspect right fifth rib. No pneumothorax    No imaging evidence of acute traumatic injury involving the abdomen or   pelvis.    Large periumbilical hernia contains transverse colon without bowel   obstruction    Possible thickened endometrium. Consider pelvic ultrasound when the   patient's overall condition improves      Findings regarding left atrial appendage thrombus were discussedwith Dr. Sneed  2/11/2022 6:18 PM by Dr. Murphy with read back confirmation.    --- End of Report ---            BON MURPHY MD; Attending Radiologist  This document has been electronically signed. Feb 11 2022  6:38PM    < end of copied text >

## 2022-02-14 NOTE — CONSULT NOTE ADULT - ASSESSMENT
The patient is a 66y Female who is followed by neurology because of stroke    Stroke  Left MCA stroke appears acute, right PCA stroke appears subacute  has afib with RVR, atrial appendage clot\  on heparin    She has large stroke and is at risk for hemorrhage, however she also has high risk for recurrent embolic stroke if not anticoagulated    I would proceed with anticoagulation with extreme caution, continue heparin, hold and reverse,  STATHead CT if she has acute neurologic event.    discussed with MICU PA    will follow with you    Wilmar Salinas MD PhD   [id number]

## 2022-02-14 NOTE — CONSULT NOTE ADULT - CONSULT REQUESTED DATE/TIME
12-Feb-2022 11:32
13-Feb-2022 15:24
14-Feb-2022 10:26
14-Feb-2022 12:55
11-Feb-2022 22:18
11-Feb-2022 19:45
13-Feb-2022 13:00
11-Feb-2022 23:45

## 2022-02-14 NOTE — PROGRESS NOTE ADULT - SUBJECTIVE AND OBJECTIVE BOX
OVERNIGHT EVENTS: doing poorly.     Present Symptoms:     Dyspnea: none   Nausea/Vomiting: unable   Anxiety:  unable  Depression: unable   Fatigue: unable   Loss of appetite: unable   Constipation: none     Pain: none             Character-            Duration-            Effect-            Factors-            Frequency-            Location-            Severity-    Pain AD Score:  [email or web address]/[email or web address] (press ctrl + left click to view)    Review of Systems: Reviewed             Unable to obtain due to poor mentation   All others negative    MEDICATIONS  (STANDING):  ceFAZolin   IVPB 2000 milliGRAM(s) IV Intermittent every 8 hours  diltiazem    Tablet 60 milliGRAM(s) Oral every 6 hours  heparin  Infusion. 1100 Unit(s)/Hr (11 mL/Hr) IV Continuous <Continuous>  pantoprazole  Injectable 40 milliGRAM(s) IV Push every 12 hours  petrolatum Ophthalmic Ointment 1 Application(s) Both EYES two times a day    PHYSICAL EXAM:    Vital Signs Last 24 Hrs  T(C): 37.3 (14 Feb 2022 11:43), Max: 37.5 (14 Feb 2022 00:00)  T(F): 99.1 (14 Feb 2022 11:43), Max: 99.5 (14 Feb 2022 00:00)  HR: 109 (14 Feb 2022 10:00) (103 - 135)  BP: 131/73 (14 Feb 2022 10:00) (80/45 - 138/114)  BP(mean): 90 (14 Feb 2022 10:00) (55 - 122)  RR: 16 (14 Feb 2022 10:00) (14 - 19)  SpO2: 98% (14 Feb 2022 10:00) (97% - 100%)    General: alert unable to participate in conversation     Karnofsky:  20 %    HEENT: normal      Lungs: comfortable     CV: normal      GI: NG tube     : can    MSK: weakness     Skin: no rash    LABS:                      10.0   22.66 )-----------( 155      ( 14 Feb 2022 06:11 )             31.7     02-14    153<H>  |  115<H>  |  45.9<H>  ----------------------------<  97  4.6   |  25.0  |  0.80    Ca    9.1      14 Feb 2022 04:46  Phos  2.5     02-14  Mg     2.6     02-14    TPro  6.2<L>  /  Alb  3.0<L>  /  TBili  0.7  /  DBili  x   /  AST  64<H>  /  ALT  71<H>  /  AlkPhos  166<H>  02-13    PTT - ( 14 Feb 2022 06:11 )  PTT:29.5 sec    I&O's Summary    13 Feb 2022 07:01  -  14 Feb 2022 07:00  --------------------------------------------------------  IN: 1730 mL / OUT: 1495 mL / NET: 235 mL    14 Feb 2022 07:01  -  14 Feb 2022 12:32  --------------------------------------------------------  IN: 0 mL / OUT: 150 mL / NET: -150 mL    RADIOLOGY & ADDITIONAL STUDIES:    < from: CT Head No Cont (02.13.22 @ 23:43) >    INTERPRETATION:  Clinical indication: Follow-up CVA.    Multiple axial sections were performed from base of skull to vertex   without contrast enhancement. Coronal and sagittal reconstructions were   performed as well    This exam is compared with prior head CT performed on February 11, 2022.    Parenchymal volume loss and chronic microvascular ischemic changes are   identified.    Abnormal low-attenuation involving the left temporal frontoparietal   region is seen with involvement of the left basal ganglia and corona   radiata region. This finding was present on the prior study. This is   compatible with a subacute left MCA infarct. No hemorrhagic   transformation is seen. There is localized mass effect seen consisting of   sulcal effacement. No significant shift or herniation is seen.    Abnormal low-attenuation is seen involving the right occipital parietal   cortical subcortical region. This is compatible with an acute to subacute   right PCA infarct. No hemorrhagic transformation is seen. No significant   shift or herniation is seen    Old right cerebellar infarct is again seen and unchanged.    Evaluation of the osseous structures with the appropriate window   demonstrates hyperostosis frontalis interna    Left-sided NG tube is seen. Left ethmoid sinus mucosal thickening is   seen. Both mastoid and middle ear regions appear clear.    IMPRESSION: No significant change when allowing for differences in   technique.    --- End of Report ---    < end of copied text >    < from: CT Cervical Spine No Cont (02.11.22 @ 23:34) >  IMPRESSION:    Cervical spine CT: No acute fracture. No rotary subluxation at the   atlantoaxial joint on this current exam.    --- End of Report ---    < end of copied text >    ADVANCE DIRECTIVES/TREATMENT PREFERENCES:  Full code

## 2022-02-14 NOTE — CHART NOTE - NSCHARTNOTEFT_GEN_A_CORE
Palliative care social work note.    Case reviewed and SW contacted jeanne Montero to obtain further information and history. Jeanne reports that patient had been  no children and was living in [place] up to approx 10 years ago. patient was living in [facility] apartment [facility] where sister also resides. patient was living alone and as per Kylah states that she was conversant and independent with use if scooter . Niece reports patient was extremely private and did not allow  others in home but would always attend family functions. Niece acknowledges awareness of mental health issues but is unclear of any psychiatric tx or history since patient did not disclose to family. Family having police escort at complex with management to open apartment to access medications and ID . Jeanne acknowledges that patient often would be using multiple other voices and speaking to others who were not present as well had alot of paranoia. Nimarty reports sister Jocy believes God will perform a miracle. Jeanne Montero and her daughter enriqueta Cornelius have been visiting. SW addressed with Kylah that if Jocy unable or unwilling to make further decisions that she can relinquish herself as HCP and surrogate [place] state regulations then would go into place. Kylah mentioned that patient also had other sister Karie. Patients sister Jocy keeps phone ringer off till 2pm daily so she can not be reached until that time. Case discussed with palliative care physician Dr Adames.
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
## 2022-02-14 NOTE — CONSULT NOTE ADULT - CONSULT REQUESTED BY NAME
Salvador Sneed
Dr. Cabrera
Dr. Cabrera
Dr. Sarkar
Dr Sarkar
ED Team
[facility]

## 2022-02-14 NOTE — CONSULT NOTE ADULT - REASON FOR ADMISSION
CVA / Atrial Appendage Clots / Cervical Subluxation / AF with RVR
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
## 2022-02-14 NOTE — ADVANCED PRACTICE NURSE CONSULT - RECOMMEDATIONS
·	Right Arm/Hand - Cleanse with NS, pat dry, Xeroform to open areas, ABD pad, Kerlix Wrap - daily   ·	B/L LE - Cleanse w/ NS, Pat dry, Moisturize intact skin w/ Pink Top (Sween) cream, Xeroform to open areas, Cover w/ ABD pad/ gauze, Secure w/ Kerlix roll gauze ACE WRAP from base of toes to knee in figure of 8 pattern (remove ACE at HS) -  Dressing change daily   ·	Right Arm/Hand  and B/L LE- Cleanse with NS, pat dry, Xeroform to open areas, ABD pad, Kerlix Wrap - daily   ·	Turn and Reposition Q2H,   ·	Offload sacral-coccygeal area with positioner pillow, alternate sides Q2H,   ·	Incontinence care with repositioning Q2H  ·	Apply Heel-Offloading cAIRboots at all times while in bed. - provide skin checks and foot placement Q8H
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
## 2022-02-15 ENCOUNTER — TRANSCRIPTION ENCOUNTER (OUTPATIENT)
Age: 67
End: 2022-02-15

## 2022-02-15 LAB
ALBUMIN SERPL ELPH-MCNC: 3 G/DL — LOW (ref 3.3–5.2)
ALP SERPL-CCNC: 154 U/L — HIGH (ref 40–120)
ALT FLD-CCNC: 35 U/L — HIGH
ANION GAP SERPL CALC-SCNC: 11 MMOL/L — SIGNIFICANT CHANGE UP (ref 5–17)
APTT BLD: 30.4 SEC — SIGNIFICANT CHANGE UP (ref 27.5–35.5)
APTT BLD: 57.1 SEC — HIGH (ref 27.5–35.5)
APTT BLD: 78 SEC — HIGH (ref 27.5–35.5)
AST SERPL-CCNC: 42 U/L — HIGH
BILIRUB SERPL-MCNC: 0.4 MG/DL — SIGNIFICANT CHANGE UP (ref 0.4–2)
BUN SERPL-MCNC: 26.4 MG/DL — HIGH (ref 8–20)
CALCIUM SERPL-MCNC: 8.7 MG/DL — SIGNIFICANT CHANGE UP (ref 8.6–10.2)
CHLORIDE SERPL-SCNC: 116 MMOL/L — HIGH (ref 98–107)
CO2 SERPL-SCNC: 25 MMOL/L — SIGNIFICANT CHANGE UP (ref 22–29)
CREAT SERPL-MCNC: 0.56 MG/DL — SIGNIFICANT CHANGE UP (ref 0.5–1.3)
GLUCOSE SERPL-MCNC: 132 MG/DL — HIGH (ref 70–99)
HCT VFR BLD CALC: 29.5 % — LOW (ref 34.5–45)
HGB BLD-MCNC: 9.3 G/DL — LOW (ref 11.5–15.5)
MAGNESIUM SERPL-MCNC: 2.6 MG/DL — SIGNIFICANT CHANGE UP (ref 1.6–2.6)
MCHC RBC-ENTMCNC: 27.2 PG — SIGNIFICANT CHANGE UP (ref 27–34)
MCHC RBC-ENTMCNC: 31.5 GM/DL — LOW (ref 32–36)
MCV RBC AUTO: 86.3 FL — SIGNIFICANT CHANGE UP (ref 80–100)
NRBC # BLD: 2 /100 WBCS — HIGH (ref 0–0)
PHOSPHATE SERPL-MCNC: 2 MG/DL — LOW (ref 2.4–4.7)
PLATELET # BLD AUTO: 173 K/UL — SIGNIFICANT CHANGE UP (ref 150–400)
POTASSIUM SERPL-MCNC: 3.8 MMOL/L — SIGNIFICANT CHANGE UP (ref 3.5–5.3)
POTASSIUM SERPL-SCNC: 3.8 MMOL/L — SIGNIFICANT CHANGE UP (ref 3.5–5.3)
PROT SERPL-MCNC: 5.7 G/DL — LOW (ref 6.6–8.7)
RBC # BLD: 3.42 M/UL — LOW (ref 3.8–5.2)
RBC # FLD: 17.7 % — HIGH (ref 10.3–14.5)
SODIUM SERPL-SCNC: 152 MMOL/L — HIGH (ref 135–145)
WBC # BLD: 21.45 K/UL — HIGH (ref 3.8–10.5)
WBC # FLD AUTO: 21.45 K/UL — HIGH (ref 3.8–10.5)

## 2022-02-15 PROCEDURE — 70544 MR ANGIOGRAPHY HEAD W/O DYE: CPT | Mod: 26,59

## 2022-02-15 PROCEDURE — 70551 MRI BRAIN STEM W/O DYE: CPT | Mod: 26

## 2022-02-15 PROCEDURE — 99233 SBSQ HOSP IP/OBS HIGH 50: CPT

## 2022-02-15 PROCEDURE — 99232 SBSQ HOSP IP/OBS MODERATE 35: CPT

## 2022-02-15 PROCEDURE — 99231 SBSQ HOSP IP/OBS SF/LOW 25: CPT

## 2022-02-15 PROCEDURE — 72141 MRI NECK SPINE W/O DYE: CPT | Mod: 26

## 2022-02-15 PROCEDURE — 99497 ADVNCD CARE PLAN 30 MIN: CPT | Mod: 25

## 2022-02-15 PROCEDURE — 70547 MR ANGIOGRAPHY NECK W/O DYE: CPT | Mod: 26

## 2022-02-15 RX ORDER — ACETAMINOPHEN 500 MG
650 TABLET ORAL EVERY 6 HOURS
Refills: 0 | Status: DISCONTINUED | OUTPATIENT
Start: 2022-02-15 | End: 2022-02-17

## 2022-02-15 RX ORDER — OXYBUTYNIN CHLORIDE 5 MG
10 TABLET ORAL DAILY
Refills: 0 | Status: DISCONTINUED | OUTPATIENT
Start: 2022-02-15 | End: 2022-02-22

## 2022-02-15 RX ORDER — DEXMEDETOMIDINE HYDROCHLORIDE IN 0.9% SODIUM CHLORIDE 4 UG/ML
0.3 INJECTION INTRAVENOUS
Qty: 200 | Refills: 0 | Status: DISCONTINUED | OUTPATIENT
Start: 2022-02-15 | End: 2022-02-15

## 2022-02-15 RX ORDER — POTASSIUM PHOSPHATE, MONOBASIC POTASSIUM PHOSPHATE, DIBASIC 236; 224 MG/ML; MG/ML
15 INJECTION, SOLUTION INTRAVENOUS ONCE
Refills: 0 | Status: COMPLETED | OUTPATIENT
Start: 2022-02-15 | End: 2022-02-15

## 2022-02-15 RX ORDER — HEPARIN SODIUM 5000 [USP'U]/ML
1100 INJECTION INTRAVENOUS; SUBCUTANEOUS
Qty: 25000 | Refills: 0 | Status: DISCONTINUED | OUTPATIENT
Start: 2022-02-15 | End: 2022-02-21

## 2022-02-15 RX ORDER — OXYCODONE HYDROCHLORIDE 5 MG/1
5 TABLET ORAL
Refills: 0 | Status: COMPLETED | OUTPATIENT
Start: 2022-02-15 | End: 2022-02-22

## 2022-02-15 RX ORDER — FENTANYL CITRATE 50 UG/ML
50 INJECTION INTRAVENOUS ONCE
Refills: 0 | Status: DISCONTINUED | OUTPATIENT
Start: 2022-02-15 | End: 2022-02-15

## 2022-02-15 RX ADMIN — Medication 60 MILLIGRAM(S): at 00:24

## 2022-02-15 RX ADMIN — Medication 60 MILLIGRAM(S): at 23:59

## 2022-02-15 RX ADMIN — PANTOPRAZOLE SODIUM 40 MILLIGRAM(S): 20 TABLET, DELAYED RELEASE ORAL at 18:02

## 2022-02-15 RX ADMIN — FENTANYL CITRATE 50 MICROGRAM(S): 50 INJECTION INTRAVENOUS at 11:44

## 2022-02-15 RX ADMIN — Medication 100 MILLIGRAM(S): at 05:58

## 2022-02-15 RX ADMIN — Medication 60 MILLIGRAM(S): at 18:01

## 2022-02-15 RX ADMIN — Medication 100 MILLIGRAM(S): at 22:01

## 2022-02-15 RX ADMIN — Medication 100 MILLIGRAM(S): at 13:23

## 2022-02-15 RX ADMIN — Medication 12.5 MILLIGRAM(S): at 18:01

## 2022-02-15 RX ADMIN — POTASSIUM PHOSPHATE, MONOBASIC POTASSIUM PHOSPHATE, DIBASIC 62.5 MILLIMOLE(S): 236; 224 INJECTION, SOLUTION INTRAVENOUS at 14:14

## 2022-02-15 RX ADMIN — HEPARIN SODIUM 1600 UNIT(S)/HR: 5000 INJECTION INTRAVENOUS; SUBCUTANEOUS at 18:47

## 2022-02-15 RX ADMIN — Medication 12.5 MILLIGRAM(S): at 05:55

## 2022-02-15 RX ADMIN — PANTOPRAZOLE SODIUM 40 MILLIGRAM(S): 20 TABLET, DELAYED RELEASE ORAL at 05:56

## 2022-02-15 RX ADMIN — OXYCODONE HYDROCHLORIDE 5 MILLIGRAM(S): 5 TABLET ORAL at 18:01

## 2022-02-15 RX ADMIN — OXYCODONE HYDROCHLORIDE 5 MILLIGRAM(S): 5 TABLET ORAL at 05:55

## 2022-02-15 RX ADMIN — Medication 1 APPLICATION(S): at 18:02

## 2022-02-15 RX ADMIN — OXYCODONE HYDROCHLORIDE 5 MILLIGRAM(S): 5 TABLET ORAL at 07:45

## 2022-02-15 RX ADMIN — HEPARIN SODIUM 1100 UNIT(S)/HR: 5000 INJECTION INTRAVENOUS; SUBCUTANEOUS at 19:21

## 2022-02-15 RX ADMIN — Medication 60 MILLIGRAM(S): at 05:56

## 2022-02-15 RX ADMIN — Medication 1 APPLICATION(S): at 05:55
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
## 2022-02-15 NOTE — DISCHARGE NOTE PROVIDER - NSDCCPCAREPLAN_GEN_ALL_CORE_FT
PRINCIPAL DISCHARGE DIAGNOSIS  Diagnosis: Acute ischemic left MCA stroke  Assessment and Plan of Treatment: you were found to have a stroke which was confirmed on MRI. The stroke was likely due to your afib.  please follow up with neurology OP for further management      SECONDARY DISCHARGE DIAGNOSES  Diagnosis: Atlantoaxial subluxation  Assessment and Plan of Treatment: No surgical intervention advised as appeared to have resolved on subsequent CT imaging    Diagnosis: Left atrial thrombus  Assessment and Plan of Treatment: you were  started on diltiazem and metoprolol and eliquis    Diagnosis: Hypernatremia  Assessment and Plan of Treatment: resolved    Diagnosis: Bacteremia  Assessment and Plan of Treatment: completed 7-day course of antibiotics    Diagnosis: E. coli UTI  Assessment and Plan of Treatment: you completed a course of antibiotics.     PRINCIPAL DISCHARGE DIAGNOSIS  Diagnosis: Acute ischemic left MCA stroke  Assessment and Plan of Treatment: you were found to have a stroke which was confirmed on MRI. The stroke was likely due to your afib therefore you must continue eliquis 5 mg twice a day as discussed with Dr. Morrow (Cardiologist)  please continue medications as prescribed and please follow up with neurology OP for further management.  Repeat CT head if any neurologic changes      SECONDARY DISCHARGE DIAGNOSES  Diagnosis: Atlantoaxial subluxation  Assessment and Plan of Treatment: No surgical intervention advised as appeared to have resolved on subsequent CT imaging  Follow up with your PCP within 1 week    Diagnosis: Left atrial thrombus  Assessment and Plan of Treatment: you were  started on diltiazem, metoprolol and eliquis  please follow up with cardiology within 1 week    Diagnosis: Bacteremia  Assessment and Plan of Treatment: completed 7-day course of antibiotics    Diagnosis: E. coli UTI  Assessment and Plan of Treatment: you completed a course of antibiotics.  follow up with your PCP    Diagnosis: Wound, open, leg, left, initial encounter  Assessment and Plan of Treatment: continue local wound care  B/L LE- Cleanse with NS, pat dry, Xeroform to open areas, ABD pad, Kerlix Wrap - daily

## 2022-02-15 NOTE — DISCHARGE NOTE PROVIDER - NSDCMRMEDTOKEN_GEN_ALL_CORE_FT
atorvastatin 40 mg oral tablet: 1 tab(s) orally once a day  lisinopril 10 mg oral tablet: 1 tab(s) orally once a day  metoprolol succinate 200 mg oral tablet, extended release: 1 tab(s) orally once a day  oxybutynin 10 mg/24 hr oral tablet, extended release: 1 tab(s) orally once a day  zolpidem 5 mg oral tablet: 1 tab(s) orally once a day (at bedtime)   apixaban 5 mg oral tablet: 1 tab(s) orally every 12 hours  atorvastatin 40 mg oral tablet: 1 tab(s) orally once a day  dilTIAZem 60 mg oral tablet: 1 tab(s) orally every 6 hours  ferrous sulfate 300 mg/5 mL (60 mg/5 mL elemental iron) oral liquid: 5 milliliter(s) orally once a day  metoprolol succinate 200 mg oral tablet, extended release: 1 tab(s) orally once a day  oxybutynin 10 mg/24 hr oral tablet, extended release: 1 tab(s) orally once a day  oxyCODONE 5 mg oral tablet: 1 tab(s) orally 2 times a day

## 2022-02-15 NOTE — DISCHARGE NOTE PROVIDER - ATTENDING DISCHARGE PHYSICAL EXAMINATION:
PHYSICAL EXAM  General appearance: elderly female, laying in bed, NAD, awake but dysarthric  HEENT: Normocephalic, Atraumatic, Conjunctiva clear, EOMI  Neck: Supple   Lungs: coarse breath sounds  Cardiovascular: S1S2, Regular rate and rhythm  Abdomen: Soft, Nontender, obese  Extremities: left thigh abrasion noted; healing well without any drainage  Neuro: AAO x 1 (self), Dysarthric, Right sided weakness, Unable to follow commands
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
## 2022-02-15 NOTE — DISCHARGE NOTE PROVIDER - DETAILS OF MALNUTRITION DIAGNOSIS/DIAGNOSES
This patient has been assessed with a concern for Malnutrition and was treated during this hospitalization for the following Nutrition diagnosis/diagnoses:     -  02/19/2022: Moderate protein-calorie malnutrition

## 2022-02-15 NOTE — PROGRESS NOTE ADULT - ASSESSMENT
The patient is a 66y Female who is followed by neurology because of stroke    Stroke  Left MCA stroke appears acute, right PCA stroke appears subacute  has afib with RVR, atrial appendage clot\  on heparin    She has large stroke and is at risk for hemorrhage, however she also has high risk for recurrent embolic stroke if not anticoagulated    I would proceed with anticoagulation with extreme caution, continue heparin, hold and reverse,  STATHead CT if she has acute neurologic event.    discussed with MICU PA    will follow with you    Wilmar Salinas MD PhD   [id number] .The patient is a 66y Female who is followed by neurology because of stroke    Stroke  Left MCA stroke appears acute, right PCA stroke appears subacute  has afib with RVR, atrial appendage clot  on heparin    She has large stroke and is at risk for hemorrhage, however she also has high risk for recurrent embolic stroke if not anticoagulated    I would proceed with anticoagulation with extreme caution, continue heparin, hold and reverse,  STATHead CT if she has acute neurologic event.  Recommend PTT goal of 45-60  and avid boluses of Heparin.  discussed with MICU DESTINEY Almanza)
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
## 2022-02-15 NOTE — PROGRESS NOTE ADULT - SUBJECTIVE AND OBJECTIVE BOX
ORTHO-TRAUMA SERVICE      Pt Name: MALU ARENAS    MRN: [id number]      Patient is a 66 y.o female being followed s/p fall with rotatory subluxation at atlantoaxial joint found on cervical CT, MRI pending. Patient seen bedside. Exam limited secondary to patients mental status.       PAST MEDICAL & SURGICAL HISTORY:  PAST MEDICAL & SURGICAL HISTORY:      Allergies: No Known Allergies      Medications: acetaminophen    Suspension .. 650 milliGRAM(s) Oral every 6 hours PRN  ceFAZolin   IVPB 2000 milliGRAM(s) IV Intermittent every 8 hours  dexMEDEtomidine Infusion 0.3 MICROgram(s)/kG/Hr IV Continuous <Continuous>  diltiazem    Tablet 60 milliGRAM(s) Oral every 6 hours  heparin  Infusion. 1100 Unit(s)/Hr IV Continuous <Continuous>  metoprolol tartrate 12.5 milliGRAM(s) Oral every 12 hours  oxyCODONE    IR 5 milliGRAM(s) Oral two times a day  pantoprazole  Injectable 40 milliGRAM(s) IV Push every 12 hours  petrolatum Ophthalmic Ointment 1 Application(s) Both EYES two times a day  potassium phosphate IVPB 15 milliMole(s) IV Intermittent once                            9.3    21.45 )-----------( 173      ( 15 Feb 2022 04:28 )             29.5     02-15    152<H>  |  116<H>  |  26.4<H>  ----------------------------<  132<H>  3.8   |  25.0  |  0.56    Ca    8.7      15 Feb 2022 04:28  Phos  2.0     02-15  Mg     2.6     02-15    TPro  5.7<L>  /  Alb  3.0<L>  /  TBili  0.4  /  DBili  x   /  AST  42<H>  /  ALT  35<H>  /  AlkPhos  154<H>  02-15      IMAGING:  < from: CT Cervical Spine No Cont (22 @ 17:29) >    ACC: [id number] EXAM:  CT CERVICAL SPINE                          PROCEDURE DATE:  2022          INTERPRETATION:  CT cervical spine without IV contrast    CLINICAL INFORMATION: r/o bleed trauma    TECHNIQUE:  Contiguous axial sections were obtained through the cervical   spine using a single helical acquisition.  Additional sagittal and   coronal reconstructions of the spine were obtained.  These additional   reformatted images were used to evaluate the spine for alignment,   vertebral fractures and the integrity of the the posterior elements.     This scan was performed using automatic exposure control (radiation dose   reduction software) to obtain a diagnostic image quality scan with   patient dose as low as reasonably achievable.    FINDINGS:   No prior similar studies are available for review    There is a rotatory subluxation at the atlantoaxial joint, may be acute,   and clinical exam is needed. No acute vertebral body fracture is   identified. No destructive bony lesion.    Cervical intervertebral disc spaces show multilevel degenerative disease   and spondylosis with loss of intervertebral disc height and associated   degenerative endplate changes.  There is multilevel narrowing of the   neural foramina on a degenerative basis.  There is no evidence of   high-grade central canal stenosis.   MR would be required to evaluate the   intervertebral discs at higher sensitivity for disc pathology.    The skull base appears intact.  No neck mass is recognized.  Paraspinal   soft tissues appear intact. Visualized lymph nodes appear to be within   physiologic size limits.      IMPRESSION:    There is a rotatory subluxation at the atlantoaxial joint, may be acute,   and clinical exam is needed.  No acute fracture.  Multilevel degenerative   changes of the cervical spine as above.    --- End of Report ---          PHYSICAL EXAM:    Vital Signs Last 24 Hrs  T(C): 37.4 (15 Feb 2022 08:00), Max: 37.7 (15 Feb 2022 00:00)  T(F): 99.4 (15 Feb 2022 08:00), Max: 99.9 (15 Feb 2022 00:00)  HR: 102 (15 Feb 2022 08:00) (81 - 123)  BP: 125/64 (15 Feb 2022 08:00) (95/46 - 131/76)  BP(mean): 82 (15 Feb 2022 08:00) (53 - 100)  RR: 17 (15 Feb 2022 08:00) (13 - 20)  SpO2: 94% (15 Feb 2022 08:00) (94% - 100%)  Daily     Daily Weight in k (15 Feb 2022 04:00)    Appearance: not responsive    Musculoskeletal:           Currently in c-collar, head turned to left          Motor exam: Cannot assess due to mental status (moving left upper and lower extremity spontaneously)          Sensory: cannot assess           Vasc: Warm and perfused x 4 extremities    A/P:  Pt is a 66y Female s/p fall with rotatory subluxation at atlantoaxial joint found on cervical CT, MRI pending    Assessment:  * C-collar  * Pain control  * MRI pending  * Plan to be finalized after MRI

## 2022-02-15 NOTE — PROGRESS NOTE ADULT - SUBJECTIVE AND OBJECTIVE BOX
MALU ARENAS  [id number]      Chief Complaint:  ?Syncope/AF/CVA/LA Thrombus      Subjective:   Pt seen and examined, mildly agitated. NAD. Speech in incoherent.       Tele:  Afib, no sig events      acetaminophen    Suspension .. 650 milliGRAM(s) Oral every 6 hours PRN  ceFAZolin   IVPB 2000 milliGRAM(s) IV Intermittent every 8 hours  diltiazem    Tablet 60 milliGRAM(s) Oral every 6 hours  heparin  Infusion. 1100 Unit(s)/Hr IV Continuous <Continuous>  metoprolol tartrate 12.5 milliGRAM(s) Oral every 12 hours  oxyCODONE    IR 5 milliGRAM(s) Oral two times a day  pantoprazole  Injectable 40 milliGRAM(s) IV Push every 12 hours  petrolatum Ophthalmic Ointment 1 Application(s) Both EYES two times a day  potassium phosphate IVPB 15 milliMole(s) IV Intermittent once          Physical Exam:  T(C): 37.4 (02-15-22 @ 08:00), Max: 37.7 (02-15-22 @ 00:00)  HR: 97 (02-15-22 @ 14:00) (81 - 123)  BP: 127/66 (02-15-22 @ 14:00) (91/61 - 127/66)  RR: 15 (02-15-22 @ 14:00) (11 - 19)  SpO2: 96% (02-15-22 @ 14:00) (92% - 100%)  Wt(kg): --  General: Comfortable in NAD  Neck: No JVD  CVS: nl s1s2, no s3, irregular  Pulm: CTA b/l anteriorly  Abd: soft, non-tender, obese  Ext: No c/c/ trace LE edema  Neuro responds to pain  Psych: Mildly agitated      Labs:   15 Feb 2022 04:28    152    |  116    |  26.4   ----------------------------<  132    3.8     |  25.0   |  0.56     Ca    8.7        15 Feb 2022 04:28  Phos  2.0       15 Feb 2022 04:28  Mg     2.6       15 Feb 2022 04:28    TPro  5.7    /  Alb  3.0    /  TBili  0.4    /  DBili  x      /  AST  42     /  ALT  35     /  AlkPhos  154    15 Feb 2022 04:28                          9.3    21.45 )-----------( 173      ( 15 Feb 2022 04:28 )             29.5     PTT - ( 15 Feb 2022 04:29 )  PTT:57.1 sec      CT C/A/P:  Large volume of thrombus within the cardiac left atrial appendage.  Nondisplaced fracture of anterior aspect right fifth rib. No pneumothorax.  No imaging evidence of acute traumatic injury involving the abdomen or pelvis.  Large periumbilical hernia contains transverse colon without bowel obstruction.  Possible thickened endometrium.      Echo:   1. Endocardial visualization was enhanced with intravenous echo contrast.   2. Left ventricular ejection fraction, by visual estimation, is 60 to 65%.   3. Normal global left ventricular systolic function.   4. The mitral in-flow pattern reveals no discernable A-wave, therefore no comment on diastolic function can be made.   5. Mild thickening of the anterior and posterior mitral valve leaflets.   6. Trace mitral valve regurgitation.   7. Mild tricuspid regurgitation.   8. Peak transaortic gradient equals 10.4 mmHg, mean transaortic gradient equals 5.0 mmHg, the calculated aortic valve area equals 1.40 cm² by the continuity equation consistent with moderate aortic stenosis. The Dimensionless Index is 0.58.        Assessment:  66 year old female PMHx Schizophrenia, AF s/p CVA previously on Xarelto, HTN, HLD, COPD presented to ED after being found down at home.  Found to have subacute L MCA and R PCA infarcts.  In ER noted to be in AF with RVR requiring Diltiazem infusion in ED.  Plan was initially for cervical spine surgery but decided against.  CTC with LA thrombus.  Neuro decided can start A/C b/c CVA appears subacute.  Also no plan for surgery.  HR better with Cardizem gtt.  AF RVR likely 2/2 missing Toprol in addition to CVA and fall.  Does not appear to be in significant CHF at this time or ACS.  -Now off Cardizem gtt and just on po with rates mildly elevated but within reason.  -Echo with normal EF and no significant VHD.  Likely mild AS noted with DI>0.50.  - Was in Afib with RVR yesterday therefore Cardizem was increased. HR now improved. BP stable.     Plan:  1. Supportive care per ICU.  2. Continue Cardizem and Metoprolol.   3. Continue Heparin. Per neuro, pt still at high risk for hemorrhagic conversion and may need peg tube placement.   4. Ortho, neuro, NS f/u.  5. Eventual OT/PT/sp  6. No additional CV w/u now. Cardiology will sign off.     Please call if any questions or concerns. Thanks!       MALU ARENAS  [id number]      Chief Complaint:  ?Syncope/AF/CVA/LA Thrombus      Subjective:   Pt seen and examined, mildly agitated. NAD. Speech in incoherent.       Tele:  Afib, no sig events      acetaminophen    Suspension .. 650 milliGRAM(s) Oral every 6 hours PRN  ceFAZolin   IVPB 2000 milliGRAM(s) IV Intermittent every 8 hours  diltiazem    Tablet 60 milliGRAM(s) Oral every 6 hours  heparin  Infusion. 1100 Unit(s)/Hr IV Continuous <Continuous>  metoprolol tartrate 12.5 milliGRAM(s) Oral every 12 hours  oxyCODONE    IR 5 milliGRAM(s) Oral two times a day  pantoprazole  Injectable 40 milliGRAM(s) IV Push every 12 hours  petrolatum Ophthalmic Ointment 1 Application(s) Both EYES two times a day  potassium phosphate IVPB 15 milliMole(s) IV Intermittent once          Physical Exam:  T(C): 37.4 (02-15-22 @ 08:00), Max: 37.7 (02-15-22 @ 00:00)  HR: 97 (02-15-22 @ 14:00) (81 - 123)  BP: 127/66 (02-15-22 @ 14:00) (91/61 - 127/66)  RR: 15 (02-15-22 @ 14:00) (11 - 19)  SpO2: 96% (02-15-22 @ 14:00) (92% - 100%)  Wt(kg): --  General: Comfortable in NAD  Neck: No JVD  CVS: nl s1s2, no s3, irregular  Pulm: CTA b/l anteriorly  Abd: soft, non-tender, obese  Ext: No c/c/ trace LE edema  Neuro responds to pain  Psych: Mildly agitated      Labs:   15 Feb 2022 04:28    152    |  116    |  26.4   ----------------------------<  132    3.8     |  25.0   |  0.56     Ca    8.7        15 Feb 2022 04:28  Phos  2.0       15 Feb 2022 04:28  Mg     2.6       15 Feb 2022 04:28    TPro  5.7    /  Alb  3.0    /  TBili  0.4    /  DBili  x      /  AST  42     /  ALT  35     /  AlkPhos  154    15 Feb 2022 04:28                          9.3    21.45 )-----------( 173      ( 15 Feb 2022 04:28 )             29.5     PTT - ( 15 Feb 2022 04:29 )  PTT:57.1 sec      CT C/A/P:  Large volume of thrombus within the cardiac left atrial appendage.  Nondisplaced fracture of anterior aspect right fifth rib. No pneumothorax.  No imaging evidence of acute traumatic injury involving the abdomen or pelvis.  Large periumbilical hernia contains transverse colon without bowel obstruction.  Possible thickened endometrium.      Echo:   1. Endocardial visualization was enhanced with intravenous echo contrast.   2. Left ventricular ejection fraction, by visual estimation, is 60 to 65%.   3. Normal global left ventricular systolic function.   4. The mitral in-flow pattern reveals no discernable A-wave, therefore no comment on diastolic function can be made.   5. Mild thickening of the anterior and posterior mitral valve leaflets.   6. Trace mitral valve regurgitation.   7. Mild tricuspid regurgitation.   8. Peak transaortic gradient equals 10.4 mmHg, mean transaortic gradient equals 5.0 mmHg, the calculated aortic valve area equals 1.40 cm² by the continuity equation consistent with moderate aortic stenosis. The Dimensionless Index is 0.58.        Assessment:  66 year old female PMHx Schizophrenia, AF s/p CVA previously on Xarelto, HTN, HLD, COPD presented to ED after being found down at home.  Found to have subacute L MCA and R PCA infarcts.  In ER noted to be in AF with RVR requiring Diltiazem infusion in ED.  Plan was initially for cervical spine surgery but decided against.  CTC with LA thrombus.  Neuro decided can start A/C b/c CVA appears subacute.  Also no plan for surgery.  HR better with Cardizem gtt.  AF RVR likely 2/2 missing Toprol in addition to CVA and fall.  Does not appear to be in significant CHF at this time or ACS.  -Now off Cardizem gtt and just on po with rates mildly elevated but within reason.  -Echo with normal EF and no significant VHD.  Likely mild AS noted with DI>0.50.  - Was in Afib with RVR yesterday therefore Cardizem was increased. HR now improved. BP stable.     Plan:  1. Supportive care per ICU.  2. Continue Cardizem and Metoprolol.   3. Continue Heparin. Per neuro, pt still at high risk for hemorrhagic conversion and may need peg tube placement. NOAC to be resume at discharge if feasible from medical stand point.   4. Ortho, neuro, NS f/u.  5. Eventual OT/PT/sp  6. No additional CV w/u now. Cardiology will sign off.     Please call if any questions or concerns. Thanks!

## 2022-02-15 NOTE — PROGRESS NOTE ADULT - SUBJECTIVE AND OBJECTIVE BOX
[facility]                                                INFECTIOUS DISEASES  =======================================================                               Ulices Muñoz MD#  Clifford Jackson MD*                                     Lane Vargas MD*    Veronica Mccracken MD*            Diplomates American Board of Internal Medicine & Infectious Diseases                  # [place] Office - Appt - Tel  [phone] Fax [phone]                * [place] Office - Appt - Tel [phone] Fax [phone]                                  Hospital Consult line:  [phone]  =======================================================      [id number]  M[facility]  follow up: bacteremia,  bacteriuria, skin wounds    patient seen earlier in day.   repeat blood cx remains negative.   s/p MRI neck this AM      I have personally reviewed the labs and data; pertinent labs and data are listed in this note; please see below.   =======================================================  Past Medical & Surgical Hx:  =====================  PAST MEDICAL & SURGICAL HISTORY:    Problem List:  ==========  HEALTH ISSUES - PROBLEM Dx:        Social Hx:  =======  no toxic habits currently    FAMILY HISTORY:  no significant family history of immunosuppressive disorders in mother or father   =======================================================    REVIEW OF SYSTEMS:  Limited due to medical condition      =======================================================  Allergies  No Known Allergies         ======================================================  Physical Exam:  ============    General:  No acute distress.  Obese  Eye: Pupils are equal, round and reactive to light, Extraocular movements are intact, Normal conjunctiva.  HENT: Normocephalic, Oral mucosa is dry  NGT in place  + CERVICAL COLLAR  Neck: Supple, No lymphadenopathy.  Respiratory: Lungs with fair aeration  Cardiovascular: Normal rate, Regular rhythm,   Gastrointestinal: Soft, Non-tender, Non-distended, Normal bowel sounds.  Genitourinary: No costovertebral angle tenderness.  Lymphatics: No lymphadenopathy neck,   Musculoskeletal: Normal range of motion, Normal strength.  BILATERAL WRIST RESTRAINT  Integumentary:   MULTIPLE skin ABRASIONS AND PRESSURE ULCERS ON BILATERAL THIGHS.  dressing in place  Neurologic:  Limited due to medical condition   =======================================================         Vitals:  ============  T(F): 98.8 (15 Feb 2022 16:11), Max: 99.9 (15 Feb 2022 00:00)  HR: 98 (15 Feb 2022 17:00)  BP: 115/55 (15 Feb 2022 16:00)  RR: 17 (15 Feb 2022 17:00)  SpO2: 94% (15 Feb 2022 17:00) (92% - 99%)  temp max in last 48H T(F): , Max: 99.9 (02-15-22 @ 00:00)    =======================================================  Current Antibiotics:  ceFAZolin   IVPB 2000 milliGRAM(s) IV Intermittent every 8 hours    Other medications:  diltiazem    Tablet 60 milliGRAM(s) Oral every 6 hours  heparin  Infusion. 1100 Unit(s)/Hr IV Continuous <Continuous>  metoprolol tartrate 12.5 milliGRAM(s) Oral every 12 hours  oxyCODONE    IR 5 milliGRAM(s) Oral two times a day  pantoprazole  Injectable 40 milliGRAM(s) IV Push every 12 hours  petrolatum Ophthalmic Ointment 1 Application(s) Both EYES two times a day      =======================================================  Labs:                        9.3    21.45 )-----------( 173      ( 15 Feb 2022 04:28 )             29.5     02-15    152<H>  |  116<H>  |  26.4<H>  ----------------------------<  132<H>  3.8   |  25.0  |  0.56    Ca    8.7      15 Feb 2022 04:28  Phos  2.0     02-15  Mg     2.6     02-15    TPro  5.7<L>  /  Alb  3.0<L>  /  TBili  0.4  /  DBili  x   /  AST  42<H>  /  ALT  35<H>  /  AlkPhos  154<H>  02-15      Culture - Blood (collected 02-12-22 @ 15:31)  Source: .Blood Blood-Peripheral    Culture - Blood (collected 02-12-22 @ 15:31)  Source: .Blood Blood-Peripheral    Culture - Urine (collected 02-11-22 @ 22:15)  Source: Clean Catch Clean Catch (Midstream)  Final Report (02-14-22 @ 20:48):    >100,000 CFU/ml Escherichia coli  Organism: Escherichia coli (02-14-22 @ 20:48)  Organism: Escherichia coli (02-14-22 @ 20:48)    Sensitivities:      -  Amikacin: S <=16      -  Amoxicillin/Clavulanic Acid: S <=8/4      -  Ampicillin: R >16 These ampicillin results predict results for amoxicillin      -  Ampicillin/Sulbactam: S 8/4 Enterobacter, Klebsiella aerogenes, Citrobacter, and Serratia may develop resistance during prolonged therapy (3-4 days)      -  Aztreonam: S <=4      -  Cefazolin: S <=2 (MIC_CL_COM_ENTERIC_[id number]) For uncomplicated UTI with K. pneumoniae, E. coli, or P. mirablis: SKY <=16 is sensitive and SKY >=32 is resistant. This also predicts results for oral agents cefaclor, cefdinir, cefpodoxime, cefprozil, cefuroxime axetil, cephalexin and locarbef for uncomplicated UTI. Note that some isolates may be susceptible to these agents while testing resistant to cefazolin.      -  Cefepime: S <=2      -  Cefoxitin: S <=8      -  Ceftriaxone: S <=1 Enterobacter, Klebsiella aerogenes, Citrobacter, and Serratia may develop resistance during prolonged therapy      -  Ciprofloxacin: I 0.5      -  Ertapenem: S <=0.5      -  Gentamicin: S <=2      -  Imipenem: S <=1      -  Levofloxacin: S <=0.5      -  Meropenem: S <=1      -  Nitrofurantoin: S <=32 Should not be used to treat pyelonephritis      -  Piperacillin/Tazobactam: S <=8      -  Tigecycline: S <=2      -  Tobramycin: S <=2      -  Trimethoprim/Sulfamethoxazole: S <=0.5/9.5      Method Type: SKY    Culture - Blood (collected 02-11-22 @ 15:28)  Source: .Blood Blood-Peripheral  Gram Stain (02-12-22 @ 10:18):    Growth in aerobic and anaerobic bottles: Gram Positive Cocci in Clusters    Gram Stain performed by:    [facility]    [address] [place] [id number]    .    TYPE: (C=Critical, N=Notification, A=Abnormal) C    TESTS:  _GS    DATE/TIME CALLED: _ 02/12/2022 10:17:53    CALLED TO: Nani Caputo RN    READ BACK (2 Patient Identifiers)(Y/N): _ Y    READ BACK VALUES (Y/N): _ Y    CALLED BY: Nani Gale  Final Report (02-14-22 @ 13:22):    Growth in aerobic and anaerobic bottles: Staphylococcus epidermidis  Organism: Staphylococcus epidermidis  Staphylococcus epidermidis (02-14-22 @ 13:22)  Organism: Staphylococcus epidermidis (02-14-22 @ 13:22)    Sensitivities:      -  Ampicillin/Sulbactam: S <=8/4      -  Cefazolin: S <=4      -  Clindamycin: S <=0.25      -  Erythromycin: R >4      -  Gentamicin: S <=1 Should not be used as monotherapy      -  Oxacillin: S <=0.25      -  Penicillin: R 4      -  Rifampin: S <=1 Should not be used as monotherapy      -  Tetra/Doxy: S <=1      -  Trimethoprim/Sulfamethoxazole: S <=0.5/9.5      -  Vancomycin: S 2      Method Type: SKY  Organism: Staphylococcus epidermidis (02-14-22 @ 13:22)    Sensitivities:      -  Ampicillin/Sulbactam: S <=8/4      -  Cefazolin: S <=4      -  Clindamycin: R <=0.25 This isolate is presumed to be clindamycin resistant based on detection of inducible resistance. Clindamycin may still be effective in some patients.      -  Erythromycin: R >4      -  Gentamicin: S <=1 Should not be used as monotherapy      -  Oxacillin: S <=0.25      -  Penicillin: R 4      -  Rifampin: S <=1 Should not be used as monotherapy      -  Tetra/Doxy: S <=1      -  Trimethoprim/Sulfamethoxazole: S <=0.5/9.5      -  Vancomycin: S 2      Method Type: SKY    Culture - Blood (collected 02-11-22 @ 15:27)  Source: .Blood Blood-Peripheral  Gram Stain (02-12-22 @ 13:47):    Growth in aerobic and anaerobic bottles: Gram Positive Cocci in Clusters    ***Blood Panel PCR results on this specimen are available    approximately 3 hours after the Gram stain result.***    Gram stain, PCR, and/or culture results may not always    correspond due to difference in methodologies.    ************************************************************    This PCR assay was performed using [employer].    The following targets are tested for: Enterococcus,    vancomycin resistant enterococci, Listeria monocytogenes,    coagulase negative staphylococci, S. aureus,    methicillin resistant S. aureus, Streptococcus agalactiae    (Group B), S. pneumoniae, S. pyogenes (Group A),    Acinetobacter baumannii, Enterobacter cloacae, E. coli,    Klebsiella oxytoca, K. pneumoniae, Proteus sp.,    Serratia marcescens, Haemophilus influenzae,    Neisseria meningitidis, Pseudomonas aeruginosa, Candida    albicans, C. glabrata, C krusei, C parapsilosis,    C. tropicalis and the KPC resistance gene.    Gram Stain and BCID performed by:    [facility]    [address] [place] [address]    .    TYPE: (C=Critical, N=Notification, A=Abnormal) C    TESTS:  _ GS    DATE/TIME CALLED: _ 02/12/2022 07:33:46    CALLED TO: Nani Caputo RN    READ BACK (2Patient Identifiers)(Y/N): _ Y    READ BACK VALUES (Y/N): _ Y    CALLED BY: Nani Gale  Final Report (02-14-22 @ 13:23):    Growth in aerobic and anaerobic bottles: Staphylococcus epidermidis    See previous culture [id number]  Organism: Blood Culture PCR (02-14-22 @ 13:23)  Organism: Blood Culture PCR (02-14-22 @ 13:23)    Sensitivities:      -  Coagulase negative Staphylococcus: Detec      Method Type: PCR           SARS-CoV-2: NotDetec (02-11-22 @ 15:23)      =======================================================    < from: MR Cervical Spine No Cont (02.15.22 @ 11:14) >    ACC: [id number] EXAM:  MR SPINE CERVICAL                          PROCEDURE DATE:  02/15/2022          INTERPRETATION:  INDICATION:   Neck pain. radiculopathy  TECHNIQUE:  Sagittal and axial imaging of the cervical spine was   conducted using a 1.5 Samira superconducting magnet.  T1 and T2 techniques   were incorporated.  COMPARISON EXAMINATION:  CT cervical 2/11/2022    FINDINGS:  ALIGNMENT:  Reversal of cervical lordosis is noted, suggesting spasm.   C1-C2 is unremarkable in alignment. AlignmentC1-C2 was normal on the   second CT performed on 2/11/2022.  VERTEBRAL BODIES:  No acute fracture or destructive lesion is depicted.  DISC SPACES:  Disc degeneration and disc space narrowing is noted from C3   to C7.  C2-3:  Unremarkable  C3-4:  A small broad-based herniation is more prevalent to the left.  C4-5:  A broad-based disc bulge is noted with sac effacement  C5-6:  A broad-based ridge disc complex is more prevalent to the left.  C6-7:  A broad-based disc bulge is noted that.  C7-T1:  Unremarkable  CANAL:  No tight bony stenosis noted.  FORAMINA:  Narrowed in the mid and lower cervical region  SPINAL CORD:  No cord compression suggested. No cord edema or changes of   myelopathy noted.  MISCELLANEOUS:  The prevertebral and paraspinal tissues are unremarkable.    The craniocervical junction is unremarkable in appearance.    IMPRESSION:  1. Reversal of cervical lordosis suggesting spasm. No subluxation noted at the C1-C2 level.  2. No fracture or bony destructive lesion identified.  3. Disc disease and degenerative changes noted from C3 to C7 inclusive of a small herniation at C3-4 to the left.  4. No cord edema or changes of myelopathy.    --- End of Report ---            BELTRAN GALLO MD; Attending Radiologist  This documenthas been electronically signed. Feb 15 2022 11:36AM    < end of copied text >      < from: MR Head No Cont (02.15.22 @ 11:18) >    ACC: [id number] EXAM:  MR BRAIN                          PROCEDURE DATE:  02/15/2022          INTERPRETATION:  INDICATION:    Left MCA infarct.  TECHNIQUE:  Multiplanar brain imaging was conducted. T1, T2 and FLAIR   imaging was performed.  In addition, diffusion imaging, diffusion   coefficient assessment (ADC) and T2* was incorporated . Post contrast   imaging was performed. 7.5 cc Gadavist was administered.  COMPARISON EXAMINATION:  CT brain 2/13/2022 CT brain 2/11/2022    FINDINGS:    HEMISPHERES: There are extensive diffusion abnormalities in the left MCA   territory, corresponding to the hypodensity seen on CT. There is no   microhemorrhage in the left basal ganglia in the region of acute and/or   recent subacute ischemia. Also there isdiffuse edema and diffusion   restriction in the left the MCA territory centered in the left posterior   frontal temporal and parietal region. There is also diffusion restriction   in the right PCA territory, and there are small foci of diffusion   abnormalities in the right frontal lobe in the MCA territory. Given the   multiple areas of involvement, findings suggests embolic or thrombotic   disease. Further workup and assessment recommended.  VENTRICLES:  midline and normal in size  POSTERIOR FOSSA:  There is an old right cerebellar infarct, with no acute   abnormality noted.  EXTRA-AXIAL:  No mass or collections are depicted.  VASCULATURE:  There is absence of flow void in the left internal carotid   artery, but there is reconstitution of the supraclinoid carotid via   collaterals.  SINUSES AND MASTOIDS:  Clear.  MISCELLANEOUS:  No orbital or pituitary abnormality noted.  No skull base   lesion suggested.    IMPRESSION:    1)  Large acute left MCA territory infarct, with absence of flow void in the left internal carotid artery. However also noted are diffusion abnormalities indicative of acute ischemia in the right PCA and right MCA   territories. This suggests embolic or thrombotic disease.  2)  further clinical correlation and follow-up recommended.    --- End of Report ---            BELTRAN GALLO MD; Attending Radiologist  This document has been electronically signed. Feb 15 2022 11:21AM    < end of copied text >

## 2022-02-15 NOTE — DISCHARGE NOTE PROVIDER - CARE PROVIDERS DIRECT ADDRESSES
,[email or web address]

## 2022-02-15 NOTE — GOALS OF CARE CONVERSATION - ADVANCED CARE PLANNING - CONVERSATION DETAILS
conversation held with patient's sister Jocy via telephone with Dr. Cabrera. We addressed overall condition, prognosis, MRI results, and treatment options, as well overall goals of our therapies. We addressed with her the poor prognosis given catastrophic stroke and poor condition, as well as other factors such as gastrointestinal bleeding, bacteria in blood stream, skin ulcers. We addressed that patient will likely require long term care, feeding tube, and be total assist with all ADLs. We inquired on whether patient would want to live being completely dependent on others for ADLs. Sister states she probably would not but she feels it is too soon to make decision, and she wants to hold on to her magnus in God for a miracle to occur. She is stating at least " 2 months" at this time. I did discuss that if patient's condition worsens, or if we start seeing her suffering without potential for meaningful recovery, then we would readdress goals at that time. As of now, she wants all heroic measures to be done to keep her alive including CPR, mechanical ventilation, and PEG. A lot of emotional support provided, sister Jocy recounts going through this with her 13 year old mentally ill granddaughter who had both lungs collapse and ended up on ventilator and they had to make difficult decisions at that time.    Due to patient's health status and restrictions on visitations during this Public health emergency, advanced care planning discussion was performed via telephone with patient's sister Jocy.   30 minutes spent.

## 2022-02-15 NOTE — PROGRESS NOTE ADULT - SUBJECTIVE AND OBJECTIVE BOX
---Cervical MRI reviewed and D/w Dr. Chand. No ortho spine surgical intervention necessary at present time. Please call if any questions or concerns. Re-consult As needed.--    < from: MR Cervical Spine No Cont (02.15.22 @ 11:14) >  ACC: [id number] EXAM:  MR SPINE CERVICAL                          PROCEDURE DATE:  02/15/2022          INTERPRETATION:  INDICATION:   Neck pain. radiculopathy  TECHNIQUE:  Sagittal and axial imaging of the cervical spine was   conducted using a 1.5 Samira superconducting magnet.  T1 and T2 techniques   were incorporated.  COMPARISON EXAMINATION:  CT cervical 2/11/2022    FINDINGS:  ALIGNMENT:  Reversal of cervical lordosis is noted, suggesting spasm.   C1-C2 is unremarkable in alignment. AlignmentC1-C2 was normal on the   second CT performed on 2/11/2022.  VERTEBRAL BODIES:  No acute fracture or destructive lesion is depicted.  DISC SPACES:  Disc degeneration and disc space narrowing is noted from C3   to C7.  C2-3:  Unremarkable  C3-4:  A small broad-based herniation is more prevalent to the left.  C4-5:  A broad-based disc bulge is noted with sac effacement  C5-6:  A broad-based ridge disc complex is more prevalent to the left.  C6-7:  A broad-based disc bulge is noted that.  C7-T1:  Unremarkable  CANAL:  No tight bony stenosis noted.  FORAMINA:  Narrowed in the mid and lower cervical region  SPINAL CORD:  No cord compression suggested. No cord edema or changes of   myelopathy noted.  MISCELLANEOUS:  The prevertebral and paraspinal tissues are unremarkable.    The craniocervical junction is unremarkable in appearance.    IMPRESSION:  1. Reversal of cervical lordosis suggesting spasm. No subluxation noted   at the C1-C2 level.  2. No fracture or bony destructive lesion identified.  3. Disc disease and degenerative changes noted from C3 to C7 inclusive of   a small herniation at C3-4 to the left.  4. No cord edema or changes of myelopathy.    --- End of Report ---        BELTRAN GALLO MD; Attending Radiologist  This documenthas been electronically signed. Feb 15 2022 11:36AM          Cervical MRI reviewed and D/w Dr. Chand. No ortho spine surgical intervention necessary at present time. Please call if any questions or concerns. Re-consult As needed.

## 2022-02-15 NOTE — DISCHARGE NOTE PROVIDER - CARE PROVIDER_API CALL
Christiano Chand (DO)  Orthopaedic Surgery  [address] [place] [address], [address]  [place] [address]  Phone: [phone]  Fax: [phone]  Follow Up Time:    Christiano Chand ()  Orthopaedic Surgery  [address] [facility], [address]  [place] [address]  Phone: [phone]  Fax: [phone]  Follow Up Time:     Leland Abebe)  Neurology  [address] [facility], [address]  [place] [address]  Phone: [phone]  Fax: [phone]  Follow Up Time:    Christiano Chand (DO)  Orthopaedic Surgery  [address] [facility], [address]  [place] [address]  Phone: [phone]  Fax: [phone]  Follow Up Time:     Dennis Castañeda)  EEGEpilepsy; Neurology; Neuromuscular Medicine; Sleep Medicine; Vascular Neurology  [address] [place] [address]  Phone: [phone]  Fax: [phone]  Follow Up Time:     Abebe Morrow)  Cardiovascular Disease  [address] [place] [address]  Phone: [phone]  Fax: [phone]  Follow Up Time:

## 2022-02-15 NOTE — PROGRESS NOTE ADULT - SUBJECTIVE AND OBJECTIVE BOX
OVERNIGHT EVENTS:     Present Symptoms:     Dyspnea: Mild Moderate Severe  Nausea/Vomiting: Yes No  Anxiety:  Yes No  Depression: Yes No  Fatigue: Yes No  Loss of appetite: Yes No  Constipation:     Pain:             Character-            Duration-            Effect-            Factors-            Frequency-            Location-            Severity-    Pain AD Score:  [email or web address] (press ctrl + left click to view)    Review of Systems: Reviewed                     Negative:                     Positive:  Unable to obtain due to poor mentation   All others negative    MEDICATIONS  (STANDING):  ceFAZolin   IVPB 2000 milliGRAM(s) IV Intermittent every 8 hours  diltiazem    Tablet 60 milliGRAM(s) Oral every 6 hours  heparin  Infusion. 1100 Unit(s)/Hr (11 mL/Hr) IV Continuous <Continuous>  metoprolol tartrate 12.5 milliGRAM(s) Oral every 12 hours  oxyCODONE    IR 5 milliGRAM(s) Oral two times a day  pantoprazole  Injectable 40 milliGRAM(s) IV Push every 12 hours  petrolatum Ophthalmic Ointment 1 Application(s) Both EYES two times a day  potassium phosphate IVPB 15 milliMole(s) IV Intermittent once    MEDICATIONS  (PRN):  acetaminophen    Suspension .. 650 milliGRAM(s) Oral every 6 hours PRN Mild Pain (1 - 3), Moderate Pain (4 - 6)      PHYSICAL EXAM:    Vital Signs Last 24 Hrs  T(C): 37.4 (15 Feb 2022 08:00), Max: 37.7 (15 Feb 2022 00:00)  T(F): 99.4 (15 Feb 2022 08:00), Max: 99.9 (15 Feb 2022 00:00)  HR: 92 (15 Feb 2022 13:00) (81 - 123)  BP: 111/69 (15 Feb 2022 13:00) (91/61 - 125/64)  BP(mean): 83 (15 Feb 2022 13:00) (53 - 100)  RR: 11 (15 Feb 2022 13:00) (11 - 19)  SpO2: 92% (15 Feb 2022 13:00) (92% - 100%)    General: alert  oriented x ____ lethargic agitated                  cachexia  nonverbal  coma    Karnofsky:  %    HEENT: normal  dry mouth  ET tube/trach    Lungs: comfortable tachypnea/labored breathing  excessive secretions    CV: normal  tachycardia    GI: normal  distended  tender  no BS               PEG/NG/OG tube  constipation  last BM:     : normal  incontinent  oliguria/anuria  can    MSK: normal  weakness  edema             ambulatory  bedbound/wheelchair bound    Skin: normal  pressure ulcers- Stage_____  no rash    LABS:                      9.3    21.45 )-----------( 173      ( 15 Feb 2022 04:28 )             29.5     02-15    152<H>  |  116<H>  |  26.4<H>  ----------------------------<  132<H>  3.8   |  25.0  |  0.56    Ca    8.7      15 Feb 2022 04:28  Phos  2.0     02-15  Mg     2.6     02-15    TPro  5.7<L>  /  Alb  3.0<L>  /  TBili  0.4  /  DBili  x   /  AST  42<H>  /  ALT  35<H>  /  AlkPhos  154<H>  02-15    PTT - ( 15 Feb 2022 04:29 )  PTT:57.1 sec    I&O's Summary    14 Feb 2022 07:01  -  15 Feb 2022 07:00  --------------------------------------------------------  IN: 2979 mL / OUT: 1065 mL / NET: 1914 mL    15 Feb 2022 07:01  -  15 Feb 2022 13:31  --------------------------------------------------------  IN: 538.8 mL / OUT: 70 mL / NET: 468.8 mL    RADIOLOGY & ADDITIONAL STUDIES:    < from: MR Angio Head No Cont (02.15.22 @ 11:18) >    IMPRESSION:    1. Suboptimal study, degraded by motion.  2. Left internal carotid occlusion in the neck, without reconstitution   until the supraclinoid region. In conjunction, there is a left MCA   occlusion in the mid to distal left M1 region.  3. The right internal carotid appears to be patent, as are both vertebral   arteries.    See full description above.    CTA imaging of the head and neck may be considered for better delineation   and assessment.    --- End of Report ---    < end of copied text >    ADVANCE DIRECTIVES/TREATMENT PREFERENCES:  full code  OVERNIGHT EVENTS: s/p MRI, results below. remains with poor mental status     Present Symptoms:     Dyspnea: none   Nausea/Vomiting: No  Anxiety:  No  Depression: unable   Fatigue: unable  Loss of appetite: unable   Constipation: none     Pain: none             Character-            Duration-            Effect-            Factors-            Frequency-            Location-            Severity-    Pain AD Score:  [email or web address]/[email or web address] (press ctrl + left click to view)    Review of Systems: Reviewed                   Unable to obtain due to poor mentation   All others negative    MEDICATIONS  (STANDING):  ceFAZolin   IVPB 2000 milliGRAM(s) IV Intermittent every 8 hours  diltiazem    Tablet 60 milliGRAM(s) Oral every 6 hours  heparin  Infusion. 1100 Unit(s)/Hr (11 mL/Hr) IV Continuous <Continuous>  metoprolol tartrate 12.5 milliGRAM(s) Oral every 12 hours  oxyCODONE    IR 5 milliGRAM(s) Oral two times a day  pantoprazole  Injectable 40 milliGRAM(s) IV Push every 12 hours  petrolatum Ophthalmic Ointment 1 Application(s) Both EYES two times a day  potassium phosphate IVPB 15 milliMole(s) IV Intermittent once    MEDICATIONS  (PRN):  acetaminophen    Suspension .. 650 milliGRAM(s) Oral every 6 hours PRN Mild Pain (1 - 3), Moderate Pain (4 - 6)    PHYSICAL EXAM:    Vital Signs Last 24 Hrs  T(C): 37.4 (15 Feb 2022 08:00), Max: 37.7 (15 Feb 2022 00:00)  T(F): 99.4 (15 Feb 2022 08:00), Max: 99.9 (15 Feb 2022 00:00)  HR: 92 (15 Feb 2022 13:00) (81 - 123)  BP: 111/69 (15 Feb 2022 13:00) (91/61 - 125/64)  BP(mean): 83 (15 Feb 2022 13:00) (53 - 100)  RR: 11 (15 Feb 2022 13:00) (11 - 19)  SpO2: 92% (15 Feb 2022 13:00) (92% - 100%)    General: lethargic     Karnofsky:  20 %    HEENT: normal      Lungs: comfortable     CV: normal      GI: normal      : can    MSK: bedbound     Skin: no rash    LABS:                      9.3    21.45 )-----------( 173      ( 15 Feb 2022 04:28 )             29.5     02-15    152<H>  |  116<H>  |  26.4<H>  ----------------------------<  132<H>  3.8   |  25.0  |  0.56    Ca    8.7      15 Feb 2022 04:28  Phos  2.0     02-15  Mg     2.6     02-15    TPro  5.7<L>  /  Alb  3.0<L>  /  TBili  0.4  /  DBili  x   /  AST  42<H>  /  ALT  35<H>  /  AlkPhos  154<H>  02-15    PTT - ( 15 Feb 2022 04:29 )  PTT:57.1 sec    I&O's Summary    14 Feb 2022 07:01  -  15 Feb 2022 07:00  --------------------------------------------------------  IN: 2979 mL / OUT: 1065 mL / NET: 1914 mL    15 Feb 2022 07:01  -  15 Feb 2022 13:31  --------------------------------------------------------  IN: 538.8 mL / OUT: 70 mL / NET: 468.8 mL    RADIOLOGY & ADDITIONAL STUDIES:    < from: MR Angio Head No Cont (02.15.22 @ 11:18) >    IMPRESSION:    1. Suboptimal study, degraded by motion.  2. Left internal carotid occlusion in the neck, without reconstitution   until the supraclinoid region. In conjunction, there is a left MCA   occlusion in the mid to distal left M1 region.  3. The right internal carotid appears to be patent, as are both vertebral   arteries.    See full description above.    CTA imaging of the head and neck may be considered for better delineation   and assessment.    --- End of Report ---    < end of copied text >    ADVANCE DIRECTIVES/TREATMENT PREFERENCES:  full code

## 2022-02-15 NOTE — DISCHARGE NOTE PROVIDER - PROVIDER TOKENS
PROVIDER:[TOKEN:[[id number]]] PROVIDER:[TOKEN:[[id number]]],PROVIDER:[TOKEN:[[id number]]] PROVIDER:[TOKEN:[[id number]]],PROVIDER:[TOKEN:[[id number]]],PROVIDER:[TOKEN:[[id number]]]

## 2022-02-15 NOTE — DISCHARGE NOTE PROVIDER - NSDCQMSTROKERISK_NEU_ALL_CORE
Review of Systems   Constitutional: Negative for appetite change, chills and fever. Eyes: Negative for pain, redness and visual disturbance. Respiratory: Negative for cough, shortness of breath and wheezing. Cardiovascular: Negative for chest pain and leg swelling. Gastrointestinal: Negative for abdominal pain, constipation, diarrhea, nausea and vomiting. Genitourinary: Negative for difficulty urinating, dysuria, flank pain, frequency, hematuria and urgency. Musculoskeletal: Negative for back pain, joint swelling and myalgias. Skin: Positive for rash. Negative for wound. Neurological: Negative for dizziness, tremors and numbness. Hematological: Bruises/bleeds easily. History of a stroke or TIA High blood pressure/High cholesterol/History of a stroke or TIA
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
## 2022-02-15 NOTE — PROGRESS NOTE ADULT - SUBJECTIVE AND OBJECTIVE BOX
Patient is a 66y old  Female who presents with a chief complaint of CVA / Atrial Appendage Clots / Cervical Subluxation / AF with RVR (15 Feb 2022 14:14)      HPI:  66 year old female PMHx ( obtained from chart record) Schizophrenia, CVA ( limiting ambulation). Patient with LOC ,    + CVA - confirmed on MRI, cervical subluxation, afib on IV heparin. HR better today . Patient in C collar, NGT in place.  Opens eyes to voice. hemoglobin stable. Unclear if pt had any melena ( nurse was not at bedside )         REVIEW OF SYSTEMS:  unobtainable     PAST MEDICAL & SURGICAL HISTORY:      FAMILY HISTORY:      SOCIAL HISTORY:  Smoking Status: [ ] Current, [ ] Former, [ ] Never  Pack Years:  [  ] EtOH-no  [  ] IVDA    MEDICATIONS:  MEDICATIONS  (STANDING):  ceFAZolin   IVPB 2000 milliGRAM(s) IV Intermittent every 8 hours  diltiazem    Tablet 60 milliGRAM(s) Oral every 6 hours  heparin  Infusion. 1100 Unit(s)/Hr (11 mL/Hr) IV Continuous <Continuous>  metoprolol tartrate 12.5 milliGRAM(s) Oral every 12 hours  oxyCODONE    IR 5 milliGRAM(s) Oral two times a day  pantoprazole  Injectable 40 milliGRAM(s) IV Push every 12 hours  petrolatum Ophthalmic Ointment 1 Application(s) Both EYES two times a day    MEDICATIONS  (PRN):  acetaminophen    Suspension .. 650 milliGRAM(s) Oral every 6 hours PRN Mild Pain (1 - 3), Moderate Pain (4 - 6)      Allergies    No Known Allergies    Intolerances        Vital Signs Last 24 Hrs  T(C): 37.4 (15 Feb 2022 08:00), Max: 37.7 (15 Feb 2022 00:00)  T(F): 99.4 (15 Feb 2022 08:00), Max: 99.9 (15 Feb 2022 00:00)  HR: 97 (15 Feb 2022 14:00) (81 - 123)  BP: 127/66 (15 Feb 2022 14:00) (91/61 - 127/66)  BP(mean): 84 (15 Feb 2022 14:00) (53 - 100)  RR: 15 (15 Feb 2022 14:00) (11 - 19)  SpO2: 96% (15 Feb 2022 14:00) (92% - 100%)    02-14 @ 07:01  -  02-15 @ 07:00  --------------------------------------------------------  IN: 2979 mL / OUT: 1065 mL / NET: 1914 mL    02-15 @ 07:01  -  02-15 @ 14:56  --------------------------------------------------------  IN: 834.3 mL / OUT: 270 mL / NET: 564.3 mL          PHYSICAL EXAM:    General: ; in no acute distress- obese, elderly   HEENT: MMM, conjunctiva and sclera clear  H-RRR  L-CTA  Gastrointestinal: Soft, non-tender non-distended; Normal bowel sounds; No rebound or guarding  Extremities: Normal range of motion, No clubbing, cyanosis or edema  Neurological: Alert and oriented x 0  Skin: Warm and dry. No obvious rash      LABS:                        9.3    21.45 )-----------( 173      ( 15 Feb 2022 04:28 )             29.5     15 Feb 2022 04:28    152    |  116    |  26.4   ----------------------------<  132    3.8     |  25.0   |  0.56     Ca    8.7        15 Feb 2022 04:28  Phos  2.0       15 Feb 2022 04:28  Mg     2.6       15 Feb 2022 04:28    TPro  5.7    /  Alb  3.0    /  TBili  0.4    /  DBili  x      /  AST  42     /  ALT  35     /  AlkPhos  154    / Amylase x      /Lipase x      15 Feb 2022 04:28              RADIOLOGY & ADDITIONAL STUDIES:     < from: MR Angio Head No Cont (02.15.22 @ 11:18) >    1. Suboptimal study, degraded by motion.  2. Left internal carotid occlusion in the neck, without reconstitution   until the supraclinoid region. In conjunction, there is a left MCA   occlusion in the mid to distal left M1 region.  3. The right internal carotid appears to be patent, as are both vertebral   arteries.    See full description above.    CTA imaging of the head and neck may be considered for better delineation   and assessment.    --- End of Report ---    < end of copied text >

## 2022-02-15 NOTE — DISCHARGE NOTE PROVIDER - NSCORESITESY/N_GEN_A_CORE_RD
Internal Medicine    Subjective:      Patient ID: Kenneth Sheikh Jr. is a 77 y.o. male.    Chief Complaint: Establish Care    HPI Comments: Presents to establish care.  Admitted to the hospital for worsening fatigue, ataxia, and falls in November.  [facility] by Dr. Sheikh on 12/7/16.      Recent bronchitis:  Currently taking Levaquin 500mg daily prescribed by an outside ENT physician.  Symptoms improving per patient.  He has 4 days left of antibiotics.  Continued cough.  No fever, chills, night sweats.      HTN:  Taking Losartan 50mg daily.    HLD:  Stopped fenofibrate per Dr. Sheikh due to low cholesterol.  Planning to start fish oil.      DM-2:  Off metformin due to elevated lactate and diarrhea.  Started on Januvia 50mg daily by Dr. Sheikh.  Checks FSG once per day - ranging 's.  Lowest was 72 - no symptoms of hypoglycemia.      Pancytopenia:  Recently worked up by Heme-Onc for pancytopenia and had a bone marrow biopsy on 11/3/16 that was inconclusive.  Was never seen for follow up.      TIAs:  Taking ASA 81mg daily.  No symptoms recently    Bipolar d/o:  Taking Depeakote 750mg daily (decreased from 1000mg qHS during hospitalization).  Diagnosed age 17.  Psychiatrist is Dr. Figueroa.     Chronic ataxia:  Saw Neurology 11/30.  No falls recently.  Symptoms improving.    New diagnosis of cirrhosis 2/2 MCDONALD:  Seen By Hepatology on 12/20 after fibroscan.  Started on lactulose; Planning for EGD on 2/3/17.        H/o Rectal cancer s/p resection 06/2016:  Seen by colorectal surgery 12/16/16 - repeat biopsy taken which was negative per patient.      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 Camacho.  Had recent Mohs at [facility].      Review of Systems   Constitutional: Positive for fatigue. Negative for appetite change, chills, fever and unexpected weight change.   HENT: Negative for congestion, ear pain, hearing loss, rhinorrhea, sore  throat and trouble swallowing.    Eyes: Negative for visual disturbance.   Respiratory: Positive for cough. Negative for chest tightness, shortness of breath and wheezing.    Cardiovascular: Positive for leg swelling. Negative for chest pain and palpitations.   Gastrointestinal: Positive for abdominal distention. Negative for abdominal pain, blood in stool, constipation, diarrhea, nausea and vomiting.   Genitourinary: Negative for dysuria, frequency, hematuria and urgency.   Musculoskeletal: Negative for arthralgias and myalgias.   Skin: Negative for rash and wound.   Neurological: Positive for weakness. Negative for dizziness, numbness and headaches.        Unsteady gait, no recent falls   Hematological: Negative for adenopathy.   Psychiatric/Behavioral:        History of bipolar disorder, no depression or dany currently.       Past Medical History   Diagnosis Date    Anticoagulant long-term use     Bipolar 1 disorder     Bipolar 1 disorder 11/24/2016     bipolar    Cancer      history of skin cancer/sinus cancer & rectal cancer    Depressed bipolar affective disorder     Diabetes mellitus      type 2    EBV infection 12/7/2016     EBV DNA, PCR Latest Ref Range: None detected  None detected EBV DNA-Copies/mL Unknown Test Not Performed EBV Early Antigen Ab, IgG Latest Ref Range: <1:10 Titer 1:40 (A) EBV Nuclear Ag Ab Latest Ref Range: <1:5 Titer >=1:80 (A) EBV VCA IgG Latest Ref Range: <1:10 Titer 1:160 (A) EBV VCA IgM Latest Ref Range: <1:10 Titer <1:10     History of TIA (transient ischemic attack)      several-taking Plavix    Hx of gallstones     Hypertension     Hyperthyroidism 11/30/2016    Low HDL (under 40) 12/7/2016    Mixed hyperlipidemia 11/23/2016    JUAN MANUEL (obstructive sleep apnea)     Stroke     Transient cerebral ischemia 7/22/2016     Past Surgical History   Procedure Laterality Date    Tonsillectomy      Sinus sx for cancer      Sinus surgery for sinus cancer      Skin cancer  "removed-several times-nose & ear      Moh's procedure x4      Uvulopalatopharyngoplasty       for sleep apnea    Undescened testicle sx       Family History   Problem Relation Age of Onset    Anesthesia problems Neg Hx      Social History   Substance Use Topics    Smoking status: Former Smoker     Packs/day: 0.25     Years: 2.00    Smokeless tobacco: None      Comment: quit at age 18 less than a pack a day    Alcohol use Yes      Comment: rare       Medications and allergies reviewed.     Objective:     Visit Vitals    /82    Pulse 83    Temp 98.7 °F (37.1 °C) (Oral)    Ht 5' 8" (1.727 m)    Wt 83.2 kg (183 lb 6.8 oz)    BMI 27.89 kg/m2     Physical Exam   Constitutional: He is oriented to person, place, and time. He appears well-developed and well-nourished. No distress.   HENT:   Head: Normocephalic and atraumatic.   Eyes: Conjunctivae and EOM are normal. No scleral icterus.   Neck: No thyromegaly present.   Cardiovascular: Normal rate and regular rhythm.  Exam reveals no gallop and no friction rub.    No murmur heard.  Pulmonary/Chest: Effort normal. No respiratory distress. He has wheezes (Throughout all lung fields). He has no rales.   Abdominal: Soft. Bowel sounds are normal. He exhibits no distension and no mass. There is no tenderness. There is no rebound and no guarding.   Musculoskeletal: Normal range of motion. He exhibits edema (1+ in bilateral LE's). He exhibits no tenderness.   Lymphadenopathy:     He has no cervical adenopathy.   Neurological: He is alert and oriented to person, place, and time. No cranial nerve deficit.   Skin: No rash noted. No erythema.   Psychiatric: He has a normal mood and affect. His behavior is normal.       Assessment:     1. Type 2 diabetes mellitus without complication, without long-term current use of insulin    2. Essential hypertension    3. Liver cirrhosis secondary to MCDONALD (nonalcoholic steatohepatitis)    4. Pancytopenia    5. Bronchitis    6. " Bipolar 1 disorder        Plan:   Kenneth was seen today for establish care.    Diagnoses and all orders for this visit:    Type 2 diabetes mellitus without complication, without long-term current use of insulin   Metformin recently discontinued during hospitalization due to lactic acidosis.  Patient started on Januvia and is currently tolerating well.  -     SITagliptan (JANUVIA) 50 MG Tab; Take 1 tablet (50 mg total) by mouth once daily.    Essential hypertension   Continue losartan 50mg daily.    Liver cirrhosis secondary to MCDONALD (nonalcoholic steatohepatitis)   Seen by Hepatology last month and diagnosed with cirrhosis - patient and wife still seem unsure about this diagnosis.  Started on lactulose for hepatic encephalopathy, however patient is not currently taking.  Mental status has improved although not back to baseline per wife.  Will send message to Hepatology regarding diagnosis and possibility of diuretic for abdominal distention and LE edema.  EGD planned for February.      Pancytopenia   Seen by Hematology 10/2016 - bone marrow biopsy 11/3/16 was inconclusive.  Never seen for follow up.  Have sent message to Hematology about need for follow up appointment for further work up.  EBV and/or cirrhosis are suspected causes at this point.      Bronchitis   Continue Levaquin as prescribed by ENT.  Wheezing on exam so will add Albuterol PRN.  -     albuterol 90 mcg/actuation inhaler; Inhale 2 puffs into the lungs every 6 (six) hours as needed for Wheezing.    Bipolar 1 disorder   Currently taking Depakote 750mg qHS (has been stable on this for >20 years).  Unfortunately, Depakote is not recommended in hepatic disease.  Discussed need for patient to discuss this with his psychiatrist.  Also gave him the option of seeing me for Psychiatry, but would need Psychiatry Intake appointment first so that all psychiatric history and medication trials could be reviewed.  Patient and wife understood their options and plan to  call his psychiatrist and schedule a follow up appointment to further discuss.      Health maintenance will be reviewed at next appointment.    Addendum 1/16/17:  Have spoken with Hepatology, who recommends furosemide 20-40mg daily and spironolactone 50-100mg daily for ascites and LE edema - would need close monitoring of electrolytes; Will discuss at next visit.  They do not feel liver biopsy is necessary to make the diagnosis, especially considering the higher risk since patient is a Mu-ism.  Have spoken with Hematology, and they would like to see him in clinic in March for follow up.       Return in about 4 weeks (around 2/6/2017).    Prisca Persaud MD  Internal Medicine  [facility]   Yes
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
## 2022-02-16 DIAGNOSIS — R13.10 DYSPHAGIA, UNSPECIFIED: ICD-10-CM

## 2022-02-16 LAB
ALBUMIN SERPL ELPH-MCNC: 2.9 G/DL — LOW (ref 3.3–5.2)
ALP SERPL-CCNC: 170 U/L — HIGH (ref 40–120)
ALT FLD-CCNC: 27 U/L — SIGNIFICANT CHANGE UP
ANION GAP SERPL CALC-SCNC: 11 MMOL/L — SIGNIFICANT CHANGE UP (ref 5–17)
APTT BLD: 74.4 SEC — HIGH (ref 27.5–35.5)
APTT BLD: 90.9 SEC — HIGH (ref 27.5–35.5)
AST SERPL-CCNC: 48 U/L — HIGH
BILIRUB SERPL-MCNC: 0.3 MG/DL — LOW (ref 0.4–2)
BUN SERPL-MCNC: 21.1 MG/DL — HIGH (ref 8–20)
CALCIUM SERPL-MCNC: 8.7 MG/DL — SIGNIFICANT CHANGE UP (ref 8.6–10.2)
CHLORIDE SERPL-SCNC: 111 MMOL/L — HIGH (ref 98–107)
CO2 SERPL-SCNC: 26 MMOL/L — SIGNIFICANT CHANGE UP (ref 22–29)
CREAT SERPL-MCNC: 0.65 MG/DL — SIGNIFICANT CHANGE UP (ref 0.5–1.3)
GLUCOSE BLDC GLUCOMTR-MCNC: 103 MG/DL — HIGH (ref 70–99)
GLUCOSE BLDC GLUCOMTR-MCNC: 122 MG/DL — HIGH (ref 70–99)
GLUCOSE BLDC GLUCOMTR-MCNC: 129 MG/DL — HIGH (ref 70–99)
GLUCOSE BLDC GLUCOMTR-MCNC: 130 MG/DL — HIGH (ref 70–99)
GLUCOSE SERPL-MCNC: 120 MG/DL — HIGH (ref 70–99)
HCT VFR BLD CALC: 30.8 % — LOW (ref 34.5–45)
HGB BLD-MCNC: 9.6 G/DL — LOW (ref 11.5–15.5)
MAGNESIUM SERPL-MCNC: 2.4 MG/DL — SIGNIFICANT CHANGE UP (ref 1.6–2.6)
MCHC RBC-ENTMCNC: 27.1 PG — SIGNIFICANT CHANGE UP (ref 27–34)
MCHC RBC-ENTMCNC: 31.2 GM/DL — LOW (ref 32–36)
MCV RBC AUTO: 87 FL — SIGNIFICANT CHANGE UP (ref 80–100)
NRBC # BLD: 3 /100 WBCS — HIGH (ref 0–0)
PHOSPHATE SERPL-MCNC: 3.1 MG/DL — SIGNIFICANT CHANGE UP (ref 2.4–4.7)
PLATELET # BLD AUTO: 209 K/UL — SIGNIFICANT CHANGE UP (ref 150–400)
POTASSIUM SERPL-MCNC: 4 MMOL/L — SIGNIFICANT CHANGE UP (ref 3.5–5.3)
POTASSIUM SERPL-SCNC: 4 MMOL/L — SIGNIFICANT CHANGE UP (ref 3.5–5.3)
PROT SERPL-MCNC: 6.2 G/DL — LOW (ref 6.6–8.7)
RBC # BLD: 3.54 M/UL — LOW (ref 3.8–5.2)
RBC # FLD: 18.3 % — HIGH (ref 10.3–14.5)
SODIUM SERPL-SCNC: 148 MMOL/L — HIGH (ref 135–145)
WBC # BLD: 22.4 K/UL — HIGH (ref 3.8–10.5)
WBC # FLD AUTO: 22.4 K/UL — HIGH (ref 3.8–10.5)

## 2022-02-16 PROCEDURE — 99232 SBSQ HOSP IP/OBS MODERATE 35: CPT

## 2022-02-16 PROCEDURE — 99233 SBSQ HOSP IP/OBS HIGH 50: CPT

## 2022-02-16 PROCEDURE — 12345: CPT | Mod: NC

## 2022-02-16 RX ADMIN — Medication 60 MILLIGRAM(S): at 12:32

## 2022-02-16 RX ADMIN — Medication 12.5 MILLIGRAM(S): at 17:45

## 2022-02-16 RX ADMIN — Medication 60 MILLIGRAM(S): at 06:48

## 2022-02-16 RX ADMIN — Medication 60 MILLIGRAM(S): at 23:11

## 2022-02-16 RX ADMIN — Medication 1 APPLICATION(S): at 06:53

## 2022-02-16 RX ADMIN — PANTOPRAZOLE SODIUM 40 MILLIGRAM(S): 20 TABLET, DELAYED RELEASE ORAL at 06:48

## 2022-02-16 RX ADMIN — OXYCODONE HYDROCHLORIDE 5 MILLIGRAM(S): 5 TABLET ORAL at 17:47

## 2022-02-16 RX ADMIN — PANTOPRAZOLE SODIUM 40 MILLIGRAM(S): 20 TABLET, DELAYED RELEASE ORAL at 17:53

## 2022-02-16 RX ADMIN — Medication 100 MILLIGRAM(S): at 06:53

## 2022-02-16 RX ADMIN — Medication 10 MILLIGRAM(S): at 12:32

## 2022-02-16 RX ADMIN — Medication 12.5 MILLIGRAM(S): at 06:48

## 2022-02-16 RX ADMIN — Medication 100 MILLIGRAM(S): at 21:37

## 2022-02-16 RX ADMIN — Medication 1 APPLICATION(S): at 18:02

## 2022-02-16 RX ADMIN — HEPARIN SODIUM 1100 UNIT(S)/HR: 5000 INJECTION INTRAVENOUS; SUBCUTANEOUS at 07:42

## 2022-02-16 RX ADMIN — Medication 100 MILLIGRAM(S): at 14:14

## 2022-02-16 RX ADMIN — Medication 60 MILLIGRAM(S): at 17:44

## 2022-02-16 NOTE — PROGRESS NOTE ADULT - SUBJECTIVE AND OBJECTIVE BOX
[facility]                                                INFECTIOUS DISEASES  =======================================================                               Ulices Muñoz MD#  Clifford Jackson MD*                                     Laen Vargas MD*    Veronica cMcracken MD*            Diplomates American Board of Internal Medicine & Infectious Diseases                  # [place] Office - Appt - Tel  [phone] Fax [phone]                * [place] Office - Appt - Tel [phone] Fax [phone]                                  Hospital Consult line:  [phone]  =======================================================      [id number]  MALU ARENAS  follow up: bacteremia,  bacteriuria, skin wounds    no new issues  off Cervical collar      I have personally reviewed the labs and data; pertinent labs and data are listed in this note; please see below.   =======================================================  Past Medical & Surgical Hx:  =====================  PAST MEDICAL & SURGICAL HISTORY:    Problem List:  ==========  HEALTH ISSUES - PROBLEM Dx:       Social Hx:  =======  no toxic habits currently    FAMILY HISTORY:  no significant family history of immunosuppressive disorders in mother or father   =======================================================    REVIEW OF SYSTEMS:  Limited due to medical condition    =======================================================  Allergies  No Known Allergies    ======================================================  Physical Exam:  ============    General:  No acute distress.  Obese  Eye: Pupils are equal, round and reactive to light, Extraocular movements are intact, Normal conjunctiva.  HENT: Normocephalic, Oral mucosa is dry  NGT in place  Neck: Supple, No lymphadenopathy.  Respiratory: Lungs with fair aeration  Cardiovascular: Normal rate, Regular rhythm,   Gastrointestinal: Soft, Non-tender, Non-distended, Normal bowel sounds.  Genitourinary: No costovertebral angle tenderness.  Lymphatics: No lymphadenopathy neck,   Musculoskeletal: Normal range of motion, Normal strength.  BILATERAL WRIST RESTRAINT  Integumentary:   MULTIPLE skin ABRASIONS AND PRESSURE ULCERS ON BILATERAL THIGHS.  dressing in place  Neurologic:  Limited due to medical condition   =====================================================  Vitals:  ============  T(F): 98.3 (16 Feb 2022 12:00), Max: 99.1 (16 Feb 2022 00:00)  HR: 71 (16 Feb 2022 12:00)  BP: 116/69 (16 Feb 2022 12:00)  RR: 20 (16 Feb 2022 12:00)  SpO2: 98% (16 Feb 2022 12:00) (84% - 98%)  temp max in last 48H T(F): , Max: 99.9 (02-15-22 @ 00:00)    =======================================================  Current Antibiotics:  ceFAZolin   IVPB 2000 milliGRAM(s) IV Intermittent every 8 hours    Other medications:  diltiazem    Tablet 60 milliGRAM(s) Oral every 6 hours  heparin  Infusion. 1100 Unit(s)/Hr IV Continuous <Continuous>  metoprolol tartrate 12.5 milliGRAM(s) Oral every 12 hours  oxybutynin 10 milliGRAM(s) Oral daily  oxyCODONE    IR 5 milliGRAM(s) Oral two times a day  pantoprazole  Injectable 40 milliGRAM(s) IV Push every 12 hours  petrolatum Ophthalmic Ointment 1 Application(s) Both EYES two times a day      =======================================================  Labs:                        9.6    22.40 )-----------( 209      ( 16 Feb 2022 05:22 )             30.8     WBC Count: 22.40 K/uL (02-16-22 @ 05:22)  WBC Count: 21.45 K/uL (02-15-22 @ 04:28)  WBC Count: 22.79 K/uL (02-14-22 @ 22:02)  WBC Count: 22.66 K/uL (02-14-22 @ 06:11)  WBC Count: 23.54 K/uL (02-13-22 @ 20:33)  WBC Count: 23.90 K/uL (02-13-22 @ 04:46)  WBC Count: 21.23 K/uL (02-12-22 @ 15:29)  WBC Count: 24.51 K/uL (02-12-22 @ 06:14)  WBC Count: 12.40 K/uL (02-12-22 @ 04:56)  WBC Count: 18.67 K/uL (02-11-22 @ 15:24)      02-16    148<H>  |  111<H>  |  21.1<H>  ----------------------------<  120<H>  4.0   |  26.0  |  0.65    Ca    8.7      16 Feb 2022 05:21  Phos  3.1     02-16  Mg     2.4     02-16    TPro  6.2<L>  /  Alb  2.9<L>  /  TBili  0.3<L>  /  DBili  x   /  AST  48<H>  /  ALT  27  /  AlkPhos  170<H>  02-16      Culture - Blood (collected 02-12-22 @ 15:31)  Source: .Blood Blood-Peripheral    Culture - Blood (collected 02-12-22 @ 15:31)  Source: .Blood Blood-Peripheral    Culture - Urine (collected 02-11-22 @ 22:15)  Source: Clean Catch Clean Catch (Midstream)  Final Report (02-14-22 @ 20:48):    >100,000 CFU/ml Escherichia coli  Organism: Escherichia coli (02-14-22 @ 20:48)  Organism: Escherichia coli (02-14-22 @ 20:48)    Sensitivities:      -  Amikacin: S <=16      -  Amoxicillin/Clavulanic Acid: S <=8/4      -  Ampicillin: R >16 These ampicillin results predict results for amoxicillin      -  Ampicillin/Sulbactam: S 8/4 Enterobacter, Klebsiella aerogenes, Citrobacter, and Serratia may develop resistance during prolonged therapy (3-4 days)      -  Aztreonam: S <=4      -  Cefazolin: S <=2 (MIC_CL_[id number]) For uncomplicated UTI with K. pneumoniae, E. coli, or P. mirablis: SKY <=16 is sensitive and SKY >=32 is resistant. This also predicts results for oral agents cefaclor, cefdinir, cefpodoxime, cefprozil, cefuroxime axetil, cephalexin and locarbef for uncomplicated UTI. Note that some isolates may be susceptible to these agents while testing resistant to cefazolin.      -  Cefepime: S <=2      -  Cefoxitin: S <=8      -  Ceftriaxone: S <=1 Enterobacter, Klebsiella aerogenes, Citrobacter, and Serratia may develop resistance during prolonged therapy      -  Ciprofloxacin: I 0.5      -  Ertapenem: S <=0.5      -  Gentamicin: S <=2      -  Imipenem: S <=1      -  Levofloxacin: S <=0.5      -  Meropenem: S <=1      -  Nitrofurantoin: S <=32 Should not be used to treat pyelonephritis      -  Piperacillin/Tazobactam: S <=8      -  Tigecycline: S <=2      -  Tobramycin: S <=2      -  Trimethoprim/Sulfamethoxazole: S <=0.5/9.5      Method Type: SKY    Culture - Blood (collected 02-11-22 @ 15:28)  Source: .Blood Blood-Peripheral  Gram Stain (02-12-22 @ 10:18):    Growth in aerobic and anaerobic bottles: Gram Positive Cocci in Clusters    Gram Stain performed by:    [facility]    [address] [place] [id number]    .    TYPE: (C=Critical, N=Notification, A=Abnormal) C    TESTS:  _GS    DATE/TIME CALLED: _ 02/12/2022 10:17:53    CALLED TO: Nani Caputo RN    READ BACK (2 Patient Identifiers)(Y/N): _ Y    READ BACK VALUES (Y/N): _ Y    CALLED BY: Nani Gale  Final Report (02-14-22 @ 13:22):    Growth in aerobic and anaerobic bottles: Staphylococcus epidermidis  Organism: Staphylococcus epidermidis  Staphylococcus epidermidis (02-14-22 @ 13:22)  Organism: Staphylococcus epidermidis (02-14-22 @ 13:22)    Sensitivities:      -  Ampicillin/Sulbactam: S <=8/4      -  Cefazolin: S <=4      -  Clindamycin: S <=0.25      -  Erythromycin: R >4      -  Gentamicin: S <=1 Should not be used as monotherapy      -  Oxacillin: S <=0.25      -  Penicillin: R 4      -  Rifampin: S <=1 Should not be used as monotherapy      -  Tetra/Doxy: S <=1      -  Trimethoprim/Sulfamethoxazole: S <=0.5/9.5      -  Vancomycin: S 2      Method Type: SKY  Organism: Staphylococcus epidermidis (02-14-22 @ 13:22)    Sensitivities:      -  Ampicillin/Sulbactam: S <=8/4      -  Cefazolin: S <=4      -  Clindamycin: R <=0.25 This isolate is presumed to be clindamycin resistant based on detection of inducible resistance. Clindamycin may still be effective in some patients.      -  Erythromycin: R >4      -  Gentamicin: S <=1 Should not be used as monotherapy      -  Oxacillin: S <=0.25      -  Penicillin: R 4      -  Rifampin: S <=1 Should not be used as monotherapy      -  Tetra/Doxy: S <=1      -  Trimethoprim/Sulfamethoxazole: S <=0.5/9.5      -  Vancomycin: S 2      Method Type: SKY    Culture - Blood (collected 02-11-22 @ 15:27)  Source: .Blood Blood-Peripheral  Gram Stain (02-12-22 @ 13:47):    Growth in aerobic and anaerobic bottles: Gram Positive Cocci in Clusters    ***Blood Panel PCR results on this specimen are available    approximately 3 hours after the Gram stain result.***    Gram stain, PCR, and/or culture results may not always    correspond due to difference in methodologies.    ************************************************************    This PCR assay was performed using [employer].    The following targets are tested for: Enterococcus,    vancomycin resistant enterococci, Listeria monocytogenes,    coagulase negative staphylococci, S. aureus,    methicillin resistant S. aureus, Streptococcus agalactiae    (Group B), S. pneumoniae, S. pyogenes (Group A),    Acinetobacter baumannii, Enterobacter cloacae, E. coli,    Klebsiella oxytoca, K. pneumoniae, Proteus sp.,    Serratia marcescens, Haemophilus influenzae,    Neisseria meningitidis, Pseudomonas aeruginosa, Candida    albicans, C. glabrata, C krusei, C parapsilosis,    C. tropicalis and the KPC resistance gene.    Gram Stain and BCID performed by:    [facility]    [address] [place] [address]    .    TYPE: (C=Critical, N=Notification, A=Abnormal) C    TESTS:  _ GS    DATE/TIME CALLED: _ 02/12/2022 07:33:46    CALLED TO: Nani Caputo RN    READ BACK (2Patient Identifiers)(Y/N): _ Y    READ BACK VALUES (Y/N): _ Y    CALLED BY: Nani Gale  Final Report (02-14-22 @ 13:23):    Growth in aerobic and anaerobic bottles: Staphylococcus epidermidis    See previous culture [id number]  Organism: Blood Culture PCR (02-14-22 @ 13:23)  Organism: Blood Culture PCR (02-14-22 @ 13:23)    Sensitivities:      -  Coagulase negative Staphylococcus: Detec      Method Type: PCR         SARS-CoV-2: NotDetec (02-11-22 @ 15:23)      =======================================================      < from: MR Cervical Spine No Cont (02.15.22 @ 11:14) >    ACC: [id number] EXAM:  MR SPINE CERVICAL                          PROCEDURE DATE:  02/15/2022          INTERPRETATION:  INDICATION:   Neck pain. radiculopathy  TECHNIQUE:  Sagittal and axial imaging of the cervical spine was   conducted using a 1.5 Samira superconducting magnet.  T1 and T2 techniques   were incorporated.  COMPARISON EXAMINATION:  CT cervical 2/11/2022    FINDINGS:  ALIGNMENT:  Reversal of cervical lordosis is noted, suggesting spasm.   C1-C2 is unremarkable in alignment. AlignmentC1-C2 was normal on the   second CT performed on 2/11/2022.  VERTEBRAL BODIES:  No acute fracture or destructive lesion is depicted.  DISC SPACES:  Disc degeneration and disc space narrowing is noted from C3   to C7.  C2-3:  Unremarkable  C3-4:  A small broad-based herniation is more prevalent to the left.  C4-5:  A broad-based disc bulge is noted with sac effacement  C5-6:  A broad-based ridge disc complex is more prevalent to the left.  C6-7:  A broad-based disc bulge is noted that.  C7-T1:  Unremarkable  CANAL:  No tight bony stenosis noted.  FORAMINA:  Narrowed in the mid and lower cervical region  SPINAL CORD:  No cord compression suggested. No cord edema or changes of   myelopathy noted.  MISCELLANEOUS:  The prevertebral and paraspinal tissues are unremarkable.    The craniocervical junction is unremarkable in appearance.    IMPRESSION:  1. Reversal of cervical lordosis suggesting spasm. No subluxation noted at the C1-C2 level.  2. No fracture or bony destructive lesion identified.  3. Disc disease and degenerative changes noted from C3 to C7 inclusive of a small herniation at C3-4 to the left.  4. No cord edema or changes of myelopathy.    --- End of Report ---            BELTRAN GALLO MD; Attending Radiologist  This documenthas been electronically signed. Feb 15 2022 11:36AM    < end of copied text >      < from: MR Head No Cont (02.15.22 @ 11:18) >    ACC: [id number] EXAM:  MR BRAIN                          PROCEDURE DATE:  02/15/2022          INTERPRETATION:  INDICATION:    Left MCA infarct.  TECHNIQUE:  Multiplanar brain imaging was conducted. T1, T2 and FLAIR   imaging was performed.  In addition, diffusion imaging, diffusion   coefficient assessment (ADC) and T2* was incorporated . Post contrast   imaging was performed. 7.5 cc Gadavist was administered.  COMPARISON EXAMINATION:  CT brain 2/13/2022 CT brain 2/11/2022    FINDINGS:    HEMISPHERES: There are extensive diffusion abnormalities in the left MCA   territory, corresponding to the hypodensity seen on CT. There is no   microhemorrhage in the left basal ganglia in the region of acute and/or   recent subacute ischemia. Also there isdiffuse edema and diffusion   restriction in the left the MCA territory centered in the left posterior   frontal temporal and parietal region. There is also diffusion restriction   in the right PCA territory, and there are small foci of diffusion   abnormalities in the right frontal lobe in the MCA territory. Given the   multiple areas of involvement, findings suggests embolic or thrombotic   disease. Further workup and assessment recommended.  VENTRICLES:  midline and normal in size  POSTERIOR FOSSA:  There is an old right cerebellar infarct, with no acute   abnormality noted.  EXTRA-AXIAL:  No mass or collections are depicted.  VASCULATURE:  There is absence of flow void in the left internal carotid   artery, but there is reconstitution of the supraclinoid carotid via   collaterals.  SINUSES AND MASTOIDS:  Clear.  MISCELLANEOUS:  No orbital or pituitary abnormality noted.  No skull base   lesion suggested.    IMPRESSION:    1)  Large acute left MCA territory infarct, with absence of flow void in the left internal carotid artery. However also noted are diffusion abnormalities indicative of acute ischemia in the right PCA and right MCA   territories. This suggests embolic or thrombotic disease.  2)  further clinical correlation and follow-up recommended.    --- End of Report ---            BELTRAN GALLO MD; Attending Radiologist  This document has been electronically signed. Feb 15 2022 11:21AM    < end of copied text >

## 2022-02-16 NOTE — PROGRESS NOTE ADULT - SUBJECTIVE AND OBJECTIVE BOX
Patient is a 66y old  Female who presents with a chief complaint of CVA / Atrial Appendage Clots / Cervical Subluxation / AF with RVR (16 Feb 2022 11:56)    Patient seen and examined at bedside.     ALLERGIES:  No Known Allergies    MEDICATIONS  (STANDING):  ceFAZolin   IVPB 2000 milliGRAM(s) IV Intermittent every 8 hours  diltiazem    Tablet 60 milliGRAM(s) Oral every 6 hours  heparin  Infusion. 1100 Unit(s)/Hr (11 mL/Hr) IV Continuous <Continuous>  metoprolol tartrate 12.5 milliGRAM(s) Oral every 12 hours  oxybutynin 10 milliGRAM(s) Oral daily  oxyCODONE    IR 5 milliGRAM(s) Oral two times a day  pantoprazole  Injectable 40 milliGRAM(s) IV Push every 12 hours  petrolatum Ophthalmic Ointment 1 Application(s) Both EYES two times a day    MEDICATIONS  (PRN):  acetaminophen    Suspension .. 650 milliGRAM(s) Oral every 6 hours PRN Mild Pain (1 - 3), Moderate Pain (4 - 6)    Vital Signs Last 24 Hrs  T(F): 98.9 (16 Feb 2022 08:00), Max: 99.1 (16 Feb 2022 00:00)  HR: 87 (16 Feb 2022 08:00) (79 - 102)  BP: 112/67 (16 Feb 2022 08:00) (98/70 - 140/78)  RR: 18 (16 Feb 2022 08:00) (11 - 21)  SpO2: 97% (16 Feb 2022 08:00) (84% - 97%)  I&O's Summary    15 Feb 2022 07:01  -  16 Feb 2022 07:00  --------------------------------------------------------  IN: 3307.8 mL / OUT: 965 mL / NET: 2342.8 mL    16 Feb 2022 07:01  -  16 Feb 2022 12:29  --------------------------------------------------------  IN: 283 mL / OUT: 55 mL / NET: 228 mL      PHYSICAL EXAM:  General: NAD, laying in bed +ng tube  ENT: MMM, no thrush  Neck: Supple, No JVD  Lungs: decreased breath sounds b/l bases  Cardio: +s1/s2  Abdomen: Soft, Nontender, Nondistended; Bowel sounds present  Extremities: No calf tenderness  Skin  +multiple skin ulcers and abrasions on rue and rle    LABS:                        9.6    22.40 )-----------( 209      ( 16 Feb 2022 05:22 )             30.8     02-16    148  |  111  |  21.1  ----------------------------<  120  4.0   |  26.0  |  0.65    Ca    8.7      16 Feb 2022 05:21  Phos  3.1     02-16  Mg     2.4     02-16    TPro  6.2  /  Alb  2.9  /  TBili  0.3  /  DBili  x   /  AST  48  /  ALT  27  /  AlkPhos  170  02-16    eGFR if Non African American: 92 mL/min/1.73M2 (02-16-22 @ 05:21)  eGFR if African American: 106 mL/min/1.73M2 (02-16-22 @ 05:21)    PTT - ( 16 Feb 2022 05:22 )  PTT:74.4 sec  Lactate, Blood: 3.3 mmol/L (02-14 @ 04:46)    Glucose  POCT Blood Glucose.: 103 mg/dL (16 Feb 2022 06:02)    Cultures  Culture - Blood (collected 12 Feb 2022 15:31)  Source: .Blood Blood-Peripheral  Preliminary Report (14 Feb 2022 17:01):    No growth at 48 hours    Culture - Blood (collected 12 Feb 2022 15:31)  Source: .Blood Blood-Peripheral  Preliminary Report (14 Feb 2022 17:01):    No growth at 48 hours    Culture - Urine (collected 11 Feb 2022 22:15)  Source: Clean Catch Clean Catch (Midstream)  Final Report (14 Feb 2022 20:48):    >100,000 CFU/ml Escherichia coli  Organism: Escherichia coli (14 Feb 2022 20:48)  Organism: Escherichia coli (14 Feb 2022 20:48)    Culture - Blood (collected 11 Feb 2022 15:28)  Source: .Blood Blood-Peripheral  Gram Stain (12 Feb 2022 10:18):    Growth in aerobic and anaerobic bottles: Gram Positive Cocci in Clusters  Final Report (14 Feb 2022 13:22):    Growth in aerobic and anaerobic bottles: Staphylococcus epidermidis  Organism: Staphylococcus epidermidis  Staphylococcus epidermidis (14 Feb 2022 13:22)  Organism: Staphylococcus epidermidis (14 Feb 2022 13:22)  Organism: Staphylococcus epidermidis (14 Feb 2022 13:22)    Culture - Blood (collected 11 Feb 2022 15:27)  Source: .Blood Blood-Peripheral  Gram Stain (12 Feb 2022 13:47):    Growth in aerobic and anaerobic bottles: Gram Positive Cocci in Clusters  Final Report (14 Feb 2022 13:23):    Growth in aerobic and anaerobic bottles: Staphylococcus epidermidis    See previous culture [id number]  Organism: Blood Culture PCR (14 Feb 2022 13:23)  Organism: Blood Culture PCR (14 Feb 2022 13:23)      -  Coagulase negative Staphylococcus: Detec      Method Type: PCR    RADIOLOGY & ADDITIONAL TESTS:  - no new tests
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
## 2022-02-16 NOTE — PHYSICAL THERAPY INITIAL EVALUATION ADULT - IMPAIRMENTS FOUND, PT EVAL
aerobic capacity/endurance/arousal, attention, and cognition/cognitive impairment/decreased midline orientation/gait, locomotion, and balance/joint integrity and mobility/muscle strength/neuromotor development and sensory integration/ROM/sensory integrity/tone/ventilation and respiration/gas exchange

## 2022-02-16 NOTE — PHYSICAL THERAPY INITIAL EVALUATION ADULT - ADDITIONAL COMMENTS
Pt unable to provide info, as per chart pt lives alone and utilizes scooter for mobility. Unsure of ADLs or assist provided at home. Pt has family that is supportive
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
## 2022-02-16 NOTE — PROGRESS NOTE ADULT - SUBJECTIVE AND OBJECTIVE BOX
OVERNIGHT EVENTS: remains with NG tube, attempting to follow commands.      Present Symptoms:     Dyspnea: none   Nausea/Vomiting: No  Anxiety:  unable   Depression: unable   Fatigue: unable   Loss of appetite: unable   Constipation: none     Pain: none             Character-            Duration-            Effect-            Factors-            Frequency-            Location-            Severity-    Pain AD Score:  http://geriatrictoolkit.[email or web address]/painad.pdf (press ctrl + left click to view)    Review of Systems: Reviewed                Unable to obtain due to poor mentation   All others negative    MEDICATIONS  (STANDING):  ceFAZolin   IVPB 2000 milliGRAM(s) IV Intermittent every 8 hours  diltiazem    Tablet 60 milliGRAM(s) Oral every 6 hours  heparin  Infusion. 1100 Unit(s)/Hr (11 mL/Hr) IV Continuous <Continuous>  metoprolol tartrate 12.5 milliGRAM(s) Oral every 12 hours  oxybutynin 10 milliGRAM(s) Oral daily  oxyCODONE    IR 5 milliGRAM(s) Oral two times a day  pantoprazole  Injectable 40 milliGRAM(s) IV Push every 12 hours  petrolatum Ophthalmic Ointment 1 Application(s) Both EYES two times a day    MEDICATIONS  (PRN):  acetaminophen    Suspension .. 650 milliGRAM(s) Oral every 6 hours PRN Mild Pain (1 - 3), Moderate Pain (4 - 6)    PHYSICAL EXAM:    Vital Signs Last 24 Hrs  T(C): 37.2 (16 Feb 2022 08:00), Max: 37.3 (16 Feb 2022 00:00)  T(F): 98.9 (16 Feb 2022 08:00), Max: 99.1 (16 Feb 2022 00:00)  HR: 87 (16 Feb 2022 08:00) (79 - 102)  BP: 112/67 (16 Feb 2022 08:00) (97/70 - 140/78)  BP(mean): 66 (16 Feb 2022 02:00) (66 - 104)  RR: 18 (16 Feb 2022 08:00) (11 - 21)  SpO2: 97% (16 Feb 2022 08:00) (84% - 97%)    General: alert, opens eyes     Karnofsky:  20 %    HEENT: normal     Lungs: comfortable     CV: normal      GI: NG tube     : can    MSK: weakness      Skin: no rash    LABS:                      9.6    22.40 )-----------( 209      ( 16 Feb 2022 05:22 )             30.8     02-16    148<H>  |  111<H>  |  21.1<H>  ----------------------------<  120<H>  4.0   |  26.0  |  0.65    Ca    8.7      16 Feb 2022 05:21  Phos  3.1     02-16  Mg     2.4     02-16    TPro  6.2<L>  /  Alb  2.9<L>  /  TBili  0.3<L>  /  DBili  x   /  AST  48<H>  /  ALT  27  /  AlkPhos  170<H>  02-16    PTT - ( 16 Feb 2022 05:22 )  PTT:74.4 sec    I&O's Summary    15 Feb 2022 07:01  -  16 Feb 2022 07:00  --------------------------------------------------------  IN: 3307.8 mL / OUT: 965 mL / NET: 2342.8 mL    16 Feb 2022 07:01  -  16 Feb 2022 11:57  --------------------------------------------------------  IN: 283 mL / OUT: 55 mL / NET: 228 mL    RADIOLOGY & ADDITIONAL STUDIES:    < from: MR Angio Head No Cont (02.15.22 @ 11:18) >  TECHNIQUE:  MR angiography of the brain was performed using three   dimensional time-of-flight (3D-TOF) technique. 3D and  2-D time of flight   imaging of the neck was conducted.. Multiplanar MIP images were obtained   from the axial. data.    The study is degraded by motion    In the neck, both vertebral arteries are patent. The right internal   carotid artery appears to be patent. However on the left, there appears   to be an occlusion of the left internal carotid artery just beyond the   carotid bifurcation that. There is no visible reconstitution in the   petrous or cavernous segments.    Intracranially, the right internal carotid is patent. However there is   nonvisualization of the left internal carotid the, with the   reconstitution of the supraclinoid carotid the. Thereis absence of the   signal in the left MCA beyond the mid M1 region. The right M1 is patent.    Both anterior cerebral arteries appear to be patent.    The vertebrobasilar system is patent. Posterior cerebral arteries are   patent proximally. Howeverdistal branches are difficult to visualize due   to the extensive motion present      IMPRESSION:    1. Suboptimal study, degraded by motion.  2. Left internal carotid occlusion in the neck, without reconstitution   until the supraclinoid region. In conjunction, there is a left MCA   occlusion in the mid to distal left M1 region.  3. The right internal carotid appears to be patent, as are both vertebral   arteries.    See full description above.    CTA imaging of the head and neck may be considered for better delineation   and assessment.    --- End of Report ---    < end of copied text >    ADVANCE DIRECTIVES/TREATMENT PREFERENCES:  Full code
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
## 2022-02-16 NOTE — PROGRESS NOTE ADULT - ASSESSMENT
66yoF hx HTN, HLD, schizophrenia, prior CVA with ?residual weakness, primarily wheelchair bound, Afib previously on Xarelto, who was brought to [facility] on 2/11 after being found down at home during a wellness check advised by family after pt had no communication from family who was found to have L-acute MCA stroke in setting of Afib with LA clot and admitted to MICU for further care, with hospital course complicated by iniital concern for cervical subluxation on imaging, dysphagia s/p NGT with episode of dark emesis concerning for GI bleed, coag staph bacteremia, E. coli UTI, persistent leukocytosis    #Acute left acute MCA and right PCA stroke  - Likely cardioembolic due to underlying Afib with atrial appendage clot  - NSICU consult appreciated-> was deemed not candidate for tPA or mechanical thrombectomy  - heparin drip for now   - neuro consult appreciated     #Dysphagia  - Due to CVA  - GI consult appreciated peg pending     #Atrial fibrillation with LA atrial appendage clot  - CT chest on admission showing large volume of thrombus within the cardiac left atrial appendage  - heparin drip eventual transition to xarelto   - cardizem and metoprolol  - cardio consult apprecaited    #Anemia  - probable 2/2 chronic disease  - monitor h and h   - GI consult apprecaited  - protonix    #Coag negative Staph bacteremia  - ID consult appreciated- probable 2/2 contamination     #Skin ulcers  - Multiple skin stage 2 pressure ulcers and abrasions on RUE and RLE  - wound care consult appreciated  - ID consult appreciated cefazolin thru 2/20    #Leukocytosis  - Persistent since admission  - monitor   - ID consult appreciated- probable 2/2 reactive     #E. coli UTI  - ID consult appreciated- abx per ID    #Hypernatremia  - free water flushes   - monitor    #Hx schizophrenia  - Unclear if pt was on any psychotropic medications     #HTN-Essential  - cardizem and lisinopril  - monitor blood pressure    #HLD  - sta    prognosis guarded pending peg tube for feeds.  transfer to tele     attemepted to call patient Sister Jocy [phone] no answer.  66yoF hx HTN, HLD, schizophrenia, prior CVA with ?residual weakness, primarily wheelchair bound, Afib previously on Xarelto, who was brought to [facility] on 2/11 after being found down at home during a wellness check advised by family after pt had no communication from family who was found to have L-acute MCA stroke in setting of Afib with LA clot and admitted to MICU for further care, with hospital course complicated by iniital concern for cervical subluxation on imaging, dysphagia s/p NGT with episode of dark emesis concerning for GI bleed, coag staph bacteremia, E. coli UTI, persistent leukocytosis    #Acute left acute MCA and right PCA stroke  - Likely cardioembolic due to underlying Afib with atrial appendage clot  - NSICU consult appreciated-> was deemed not candidate for tPA or mechanical thrombectomy  - heparin drip for now   - neuro consult appreciated     #Dysphagia  - Due to CVA  - GI consult appreciated peg pending     #Atrial fibrillation with LA atrial appendage clot  - CT chest on admission showing large volume of thrombus within the cardiac left atrial appendage  - heparin drip eventual transition to xarelto   - cardizem and metoprolol  - cardio consult apprecaited    #Anemia  - probable 2/2 chronic disease  - monitor h and h   - GI consult apprecaited  - protonix    #Coag negative Staph bacteremia  - ID consult appreciated- probable 2/2 contamination     #Skin ulcers  - Multiple skin stage 2 pressure ulcers and abrasions on RUE and RLE  - wound care consult appreciated  - ID consult appreciated cefazolin thru 2/20    #Leukocytosis  - Persistent since admission  - monitor   - ID consult appreciated- probable 2/2 reactive     #E. coli UTI  - ID consult appreciated- abx per ID    #Hypernatremia  - free water flushes   - monitor    #Hx schizophrenia  - Unclear if pt was on any psychotropic medications     #HTN-Essential  - cardizem and lisinopril  - monitor blood pressure    #HLD  - statin    prognosis guarded pending peg tube for feeds.  transfer to tele     attemepted to call patient Sister Jocy [phone] no answer.

## 2022-02-16 NOTE — PROGRESS NOTE ADULT - ASSESSMENT
The patient is a 66y Female who is followed by neurology because of stroke    Stroke  Left MCA stroke appears acute, right PCA stroke appears subacute  has afib with RVR, atrial appendage clot  on heparin    She has large stroke and is at risk for hemorrhage, however she also has high risk for recurrent embolic stroke if not anticoagulated    I would proceed with anticoagulation with extreme caution, continue heparin, hold and reverse,  STATHead CT if she has acute neurologic event.  Recommend PTT goal of 50-70  and avoid boluses of Heparin.  discussed with MICU DESTINEY Almanza)     The patient is a 66y Female who is followed by neurology because of stroke    Stroke  Left MCA stroke appears acute, right PCA stroke appears subacute  has afib with RVR, atrial appendage clot  on heparin    She has large stroke and is at risk for hemorrhage, however she also has high risk for recurrent embolic stroke if not anticoagulated    I would proceed with anticoagulation with extreme caution, continue heparin, hold and reverse,  STATHead CT if she has acute neurologic event.  Recommend PTT goal of 50-70  and avoid boluses of Heparin.  Awaiting PEG.

## 2022-02-16 NOTE — PHYSICAL THERAPY INITIAL EVALUATION ADULT - PLANNED THERAPY INTERVENTIONS, PT EVAL
balance training/bed mobility training/motor coordination training/neuromuscular re-education/postural re-education/ROM/strengthening/transfer training

## 2022-02-16 NOTE — PROGRESS NOTE ADULT - ASSESSMENT
66yoF hx HTN, HLD, schizophrenia, prior CVA with ?residual weakness, primarily wheelchair bound, Afib previously on Xarelto, who was brought to [facility] on 2/11 after being found down at home during a wellness check advised by family after pt had no communication from family who was found to have L-acute MCA stroke in setting of Afib with LA clot and admitted to MICU for further care, with hospital course complicated by concern for cervical subluxation on imaging, dysphagia s/p NGT with episode of dark emesis concerning for GI bleed, coag staph bacteremia, persistent leukocytosis    Acute left acute MCA and right PCA stroke  -Likely cardioembolic due to underlying Afib with atrial appendage clot  -Had initial NICU evaluation, was deemed not candidate for tPA, mechanical thrombectomy, recommended AC  -Had been started on heparin gtt with temporary hold due to concern for bleeding (see below), now resumed as on 2/15  -Per neurology, risk of hemorrhagic conversation but also risk of recurrent embolic stroke if not on AC, recommended to proceed with AC with caution and STAT CT head if any acute neurologic changes  -While on heparin gtt, neurology recommend PPT goal of 45-60 and avoiding heparin boluses  -MRI brain confirms large L-MCA infarct with absence of flow in L-internal carotid, also shows ischemia in R-PCA and R-MCA areas    Dysphagia  -Due to CVA, has NG tube in place receiving tube feeds  -Based on last GOC discussion with MICU attending and sister/HCP, opting for aggressive measure including PEG placement  -Follow up GI recommendations about PEG     Episode of dark emesis with normocytic anemia  -On 2/13 developed bloody output from NGT  -Seen by GI on 2/14, during their eval nonbloody gastric contents, no active bleeding noted  -GI advised holding heparin for 1 day and starting IV PPI  -Hgb remained stable in 9-10 range although overall decline from 12-13  -Heparin gtt resumed on 2/15  -Etiology of anemia could be multifactorial- anemia of chronic disease  -Monitor for active bleeding and serial Hgb    Atrial fibrillation with LA atrial appendage clot  -CT chest on admission showing large volume of thrombus within the cardiac left atrial appendage  -On heparin gtt with eventual plan to transition to Xarelto at time of discharge as per cardiology  -Required diltiazem gtt in ED  -Episodic RVR episodes, per cardiology could be due to intermittent agitation   -On PO cardizem 60mg q6hr and metoprolol 12.5mg q12hr  -Cardiology was following and signed off as off 2/15 as no additional CV work up was advised    Coag negative Staph bacteremia  -Staph epidermidis from 2 sets of blood cultures on 2/11  -Probably due to multiple skin injuries caused by being found on top of scooter battery  -Being actively followed by ID who states could be contaminant   -Repeat blood cultures on 2/12 no growth   -Previously was on vancomycin  -Per ID, was advised to d/c vanco, start cefazolin 2g q8hr from 2/13/22-2/20/22     Concern for cervical subluxation  -CT neck showed rotatory subluxation at atlantoaxial joint concerning for cervical subluxation  -Orthopedics following  -No surgical intervention advised as ?appeared to have resolved on subsequent CT imaging   -Ortho recommended MRI neck which showed reversal of cervical lordosis suggesting spasm with no subluxation noted at the C1-C2 level    Skin ulcers  -Multiple skin stage 2 pressure ulcers and abrasions on RUE and RLE  -Seen by wound care who made dressing change recommendations  -Frequent repositioning     Leukocytosis  -Persistent since admission, has been in low 20K range for several days  -ID suspects reactive and states is started to gradually down-trend  -Monitor    E. coli UTI  -Urine culture grew E. coli, susceptible to cephalosporins, intermediate to ciprofloxacin    -On cefazolin as above    Hypernatremia  -Most recent Na 152, receiving free water flushes 300cc q6hr, trend BMP    Hx schizophrenia  -Unclear if pt was on any psychotropic medications     Hx HTN/HLD  -Home regimen include lisinopril 10mg, atorvastatin 40mg, metoprolol 200mg   -Lisinopril on hold as pt started on cardizem  -Metoprolol resumed but on lower dose    Encounter for palliative care  -Palliative following, sister/HCP named Jocy with request to be contacted in afternoon hours starting from 2:30-3PM  -Prognosis guarded, pt remains full code, ongoing GOC discussion  -Last GOC between MICU attending, Dr. Cabrera and sister Jocy who desires full and aggressive measures including CPR, mechanical ventilation and PEG    Medication management  -Med rec partially completed by MICU team  -Please find pharmacy and call to complete med rec    Prophylactic measure  -On heparin gtt 66yoF hx HTN, HLD, schizophrenia, prior CVA with ?residual weakness, primarily wheelchair bound, Afib previously on Xarelto, who was brought to [facility] on 2/11 after being found down at home during a wellness check advised by family after pt had no communication from family who was found to have L-acute MCA stroke in setting of Afib with LA clot and admitted to MICU for further care, with hospital course complicated by iniital concern for cervical subluxation on imaging, dysphagia s/p NGT with episode of dark emesis concerning for GI bleed, coag staph bacteremia, E. coli UTI, persistent leukocytosis    Acute left acute MCA and right PCA stroke  -Likely cardioembolic due to underlying Afib with atrial appendage clot  -Had initial NICU evaluation, was deemed not candidate for tPA, mechanical thrombectomy, recommended AC  -Had been started on heparin gtt with temporary hold due to concern for bleeding (see below), now resumed as on 2/15  -Per neurology, risk of hemorrhagic conversation but also risk of recurrent embolic stroke if not on AC, recommended to proceed with AC with caution and STAT CT head if any acute neurologic changes  -While on heparin gtt, neurology recommend PPT goal of 45-60 and avoiding heparin boluses  -MRI brain confirms large L-MCA infarct with absence of flow in L-internal carotid, also shows ischemia in R-PCA and R-MCA areas    Dysphagia  -Due to CVA, has NG tube in place receiving tube feeds  -Based on last GOC discussion with MICU attending and sister/HCP, opting for aggressive measure including PEG placement  -Follow up GI recommendations about PEG     Atrial fibrillation with LA atrial appendage clot  -CT chest on admission showing large volume of thrombus within the cardiac left atrial appendage  -On heparin gtt with eventual plan to transition to Xarelto at time of discharge as per cardiology  -Required diltiazem gtt in ED  -Episodic RVR episodes, per cardiology could be due to intermittent agitation   -On PO cardizem 60mg q6hr and metoprolol 12.5mg q12hr  -Cardiology was following and signed off as off 2/15 as no additional CV work up was advised    Episode of dark emesis with normocytic anemia  -On 2/13 developed bloody output from NGT  -Seen by GI on 2/14, during their eval nonbloody gastric contents, no active bleeding noted  -GI advised holding heparin for 1 day and starting IV PPI  -Hgb remained stable in 9-10 range although overall decline from 12-13  -Heparin gtt resumed on 2/15  -Etiology of anemia could be multifactorial- anemia of chronic disease, hemodilution   -Monitor for active bleeding and serial Hgb    Coag negative Staph bacteremia  -Staph epidermidis from 2 sets of blood cultures on 2/11  -Probably due to multiple skin injuries caused by being found on top of scooter battery  -Being actively followed by ID who states could be contaminant   -Repeat blood cultures on 2/12 no growth   -Previously was on vancomycin  -Per ID, was advised to d/c vanco, start cefazolin 2g q8hr from 2/13/22-2/20/22     Concern for cervical subluxation  -CT neck showed rotatory subluxation at atlantoaxial joint concerning for cervical subluxation  -Orthopedics following  -No surgical intervention advised as ?appeared to have resolved on subsequent CT imaging   -Ortho recommended MRI neck which showed reversal of cervical lordosis suggesting spasm with no subluxation noted at the C1-C2 level    Skin ulcers  -Multiple skin stage 2 pressure ulcers and abrasions on RUE and RLE  -Seen by wound care who made dressing change recommendations  -Frequent repositioning     Leukocytosis  -Persistent since admission, has been in low 20K range for several days  -ID suspects reactive and states is started to gradually down-trend  -Monitor    E. coli UTI  -Urine culture grew E. coli, susceptible to cephalosporins, intermediate to ciprofloxacin    -On cefazolin as above    Hypernatremia  -Most recent Na 152, receiving free water flushes 300cc q6hr, trend BMP    Hx schizophrenia  -Unclear if pt was on any psychotropic medications     Hx HTN/HLD  -Home regimen include lisinopril 10mg, atorvastatin 40mg, metoprolol 200mg   -Lisinopril on hold as pt started on cardizem  -Metoprolol resumed but on lower dose    Encounter for palliative care  -Palliative following, sister/HCP named Jocy with request to be contacted in afternoon hours starting from 2:30-3PM  -Prognosis guarded, pt remains full code, ongoing GOC discussion  -Last GOC between MICU attending, Dr. Cabrera and sister Jocy who desires full and aggressive measures including CPR, mechanical ventilation and PEG    Medication management  -Med rec partially completed by MICU team  -Please find pharmacy and call to complete med rec    Prophylactic measure  -On heparin gtt 66yoF hx HTN, HLD, schizophrenia, prior CVA with ?residual weakness, primarily wheelchair bound, Afib previously on Xarelto, who was brought to [facility] on 2/11 after being found down at home during a wellness check advised by family after pt had no communication from family who was found to have L-acute MCA stroke in setting of Afib with LA clot and admitted to MICU for further care, with hospital course complicated by iniital concern for cervical subluxation on imaging, dysphagia s/p NGT with episode of dark emesis concerning for GI bleed, coag staph bacteremia, E. coli UTI, persistent leukocytosis    Acute left acute MCA and right PCA stroke  -Likely cardioembolic due to underlying Afib with atrial appendage clot  -Had initial NICU evaluation, was deemed not candidate for tPA, mechanical thrombectomy, recommended AC  -Had been started on heparin gtt with temporary hold due to concern for bleeding (see below), now resumed as on 2/15  -Per neurology, risk of hemorrhagic conversation but also risk of recurrent embolic stroke if not on AC, recommended to proceed with AC with caution and STAT CT head if any acute neurologic changes  -While on heparin gtt, neurology recommend PPT goal of 45-60 and avoiding heparin boluses  -MRI brain confirms large L-MCA infarct with absence of flow in L-internal carotid, also shows ischemia in R-PCA and R-MCA areas    Dysphagia  -Due to CVA, has NG tube in place receiving tube feeds  -Based on last GOC discussion with MICU attending and sister/HCP, opting for aggressive measure including PEG placement  -Follow up GI recommendations about PEG     Atrial fibrillation with LA atrial appendage clot  -CT chest on admission showing large volume of thrombus within the cardiac left atrial appendage  -On heparin gtt with eventual plan to transition to Xarelto at time of discharge as per cardiology  -Required diltiazem gtt in ED  -Episodic RVR episodes, per cardiology could be due to intermittent agitation   -On PO cardizem 60mg q6hr and metoprolol 12.5mg q12hr  -Cardiology was following and signed off as off 2/15 as no additional CV work up was advised    Episode of dark emesis with normocytic anemia  -On 2/13 developed bloody output from NGT  -Seen by GI on 2/14, during their eval nonbloody gastric contents, no active bleeding noted  -GI advised holding heparin for 1 day and starting IV PPI  -Hgb remained stable in 9-10 range although overall decline from 12-13  -Heparin gtt resumed on 2/15  -Etiology of anemia could be multifactorial- anemia of chronic disease, hemodilution   -Monitor for active bleeding and serial Hgb    Coag negative Staph bacteremia  -Staph epidermidis from 2 sets of blood cultures on 2/11  -Probably due to multiple skin injuries caused by being found on top of scooter battery  -Being actively followed by ID who states could be contaminant   -Repeat blood cultures on 2/12 no growth   -Previously was on vancomycin  -Per ID, was advised to d/c vanco, start cefazolin 2g q8hr from 2/13/22-2/20/22     Concern for cervical subluxation  -CT neck showed rotatory subluxation at atlantoaxial joint concerning for cervical subluxation  -Orthopedics following  -No surgical intervention advised as appeared to have resolved on subsequent CT imaging   -Ortho recommended MRI neck which showed reversal of cervical lordosis suggesting spasm with no subluxation noted at the C1-C2 level  -Was in C-collar based on chart review, now removed    Skin ulcers  -Multiple skin stage 2 pressure ulcers and abrasions on RUE and RLE  -Seen by wound care who made dressing change recommendations  -Frequent repositioning     Leukocytosis  -Persistent since admission, has been in low 20K range for several days  -ID suspects reactive and states is started to gradually down-trend  -Monitor    E. coli UTI  -Urine culture grew E. coli, susceptible to cephalosporins, intermediate to ciprofloxacin    -On cefazolin as above    Hypernatremia  -Most recent Na 152, receiving free water flushes 300cc q6hr, trend BMP    Hx schizophrenia  -Unclear if pt was on any psychotropic medications     Hx HTN/HLD  -Home regimen include lisinopril 10mg, atorvastatin 40mg, metoprolol 200mg   -Lisinopril on hold as pt started on cardizem  -Metoprolol resumed but on lower dose    Encounter for palliative care  -Palliative following, sister/HCP named Jocy with request to be contacted in afternoon hours starting from 2:30-3PM  -Prognosis guarded, pt remains full code, ongoing GOC discussion  -Last GOC between MICU attending, Dr. Cabrera and sister Jocy who desires full and aggressive measures including CPR, mechanical ventilation and PEG    Medication management  -Med rec partially completed by MICU team  -Please find pharmacy and call to complete med rec    Prophylactic measure  -On heparin gtt

## 2022-02-17 LAB
ANION GAP SERPL CALC-SCNC: 10 MMOL/L — SIGNIFICANT CHANGE UP (ref 5–17)
APTT BLD: 85.7 SEC — HIGH (ref 27.5–35.5)
BUN SERPL-MCNC: 17.4 MG/DL — SIGNIFICANT CHANGE UP (ref 8–20)
CALCIUM SERPL-MCNC: 8.5 MG/DL — LOW (ref 8.6–10.2)
CHLORIDE SERPL-SCNC: 104 MMOL/L — SIGNIFICANT CHANGE UP (ref 98–107)
CO2 SERPL-SCNC: 27 MMOL/L — SIGNIFICANT CHANGE UP (ref 22–29)
CREAT SERPL-MCNC: 0.56 MG/DL — SIGNIFICANT CHANGE UP (ref 0.5–1.3)
CULTURE RESULTS: SIGNIFICANT CHANGE UP
CULTURE RESULTS: SIGNIFICANT CHANGE UP
GLUCOSE BLDC GLUCOMTR-MCNC: 108 MG/DL — HIGH (ref 70–99)
GLUCOSE BLDC GLUCOMTR-MCNC: 124 MG/DL — HIGH (ref 70–99)
GLUCOSE BLDC GLUCOMTR-MCNC: 126 MG/DL — HIGH (ref 70–99)
GLUCOSE SERPL-MCNC: 132 MG/DL — HIGH (ref 70–99)
HCT VFR BLD CALC: 29.5 % — LOW (ref 34.5–45)
HGB BLD-MCNC: 9.4 G/DL — LOW (ref 11.5–15.5)
INR BLD: 1.09 RATIO — SIGNIFICANT CHANGE UP (ref 0.88–1.16)
MAGNESIUM SERPL-MCNC: 2.3 MG/DL — SIGNIFICANT CHANGE UP (ref 1.6–2.6)
MCHC RBC-ENTMCNC: 27.2 PG — SIGNIFICANT CHANGE UP (ref 27–34)
MCHC RBC-ENTMCNC: 31.9 GM/DL — LOW (ref 32–36)
MCV RBC AUTO: 85.5 FL — SIGNIFICANT CHANGE UP (ref 80–100)
NRBC # BLD: 5 /100 WBCS — HIGH (ref 0–0)
PHOSPHATE SERPL-MCNC: 2.7 MG/DL — SIGNIFICANT CHANGE UP (ref 2.4–4.7)
PLATELET # BLD AUTO: 256 K/UL — SIGNIFICANT CHANGE UP (ref 150–400)
POTASSIUM SERPL-MCNC: 3.7 MMOL/L — SIGNIFICANT CHANGE UP (ref 3.5–5.3)
POTASSIUM SERPL-SCNC: 3.7 MMOL/L — SIGNIFICANT CHANGE UP (ref 3.5–5.3)
PROTHROM AB SERPL-ACNC: 12.6 SEC — SIGNIFICANT CHANGE UP (ref 10.6–13.6)
RBC # BLD: 3.45 M/UL — LOW (ref 3.8–5.2)
RBC # FLD: 17.9 % — HIGH (ref 10.3–14.5)
SARS-COV-2 RNA SPEC QL NAA+PROBE: SIGNIFICANT CHANGE UP
SODIUM SERPL-SCNC: 141 MMOL/L — SIGNIFICANT CHANGE UP (ref 135–145)
SPECIMEN SOURCE: SIGNIFICANT CHANGE UP
SPECIMEN SOURCE: SIGNIFICANT CHANGE UP
WBC # BLD: 19.55 K/UL — HIGH (ref 3.8–10.5)
WBC # FLD AUTO: 19.55 K/UL — HIGH (ref 3.8–10.5)

## 2022-02-17 PROCEDURE — 99233 SBSQ HOSP IP/OBS HIGH 50: CPT

## 2022-02-17 PROCEDURE — 99232 SBSQ HOSP IP/OBS MODERATE 35: CPT

## 2022-02-17 PROCEDURE — 74230 X-RAY XM SWLNG FUNCJ C+: CPT | Mod: 26

## 2022-02-17 RX ORDER — METOPROLOL TARTRATE 50 MG
5 TABLET ORAL EVERY 6 HOURS
Refills: 0 | Status: DISCONTINUED | OUTPATIENT
Start: 2022-02-17 | End: 2022-02-19

## 2022-02-17 RX ORDER — DILTIAZEM HCL 120 MG
10 CAPSULE, EXT RELEASE 24 HR ORAL EVERY 6 HOURS
Refills: 0 | Status: DISCONTINUED | OUTPATIENT
Start: 2022-02-17 | End: 2022-02-22

## 2022-02-17 RX ORDER — ACETAMINOPHEN 500 MG
650 TABLET ORAL EVERY 6 HOURS
Refills: 0 | Status: DISCONTINUED | OUTPATIENT
Start: 2022-02-17 | End: 2022-02-19

## 2022-02-17 RX ORDER — ONDANSETRON 8 MG/1
4 TABLET, FILM COATED ORAL EVERY 6 HOURS
Refills: 0 | Status: DISCONTINUED | OUTPATIENT
Start: 2022-02-17 | End: 2022-02-22

## 2022-02-17 RX ORDER — ATORVASTATIN CALCIUM 80 MG/1
40 TABLET, FILM COATED ORAL AT BEDTIME
Refills: 0 | Status: DISCONTINUED | OUTPATIENT
Start: 2022-02-17 | End: 2022-02-22

## 2022-02-17 RX ADMIN — Medication 10 MILLIGRAM(S): at 12:49

## 2022-02-17 RX ADMIN — PANTOPRAZOLE SODIUM 40 MILLIGRAM(S): 20 TABLET, DELAYED RELEASE ORAL at 18:09

## 2022-02-17 RX ADMIN — Medication 1 APPLICATION(S): at 18:08

## 2022-02-17 RX ADMIN — Medication 1 APPLICATION(S): at 05:20

## 2022-02-17 RX ADMIN — OXYCODONE HYDROCHLORIDE 5 MILLIGRAM(S): 5 TABLET ORAL at 05:18

## 2022-02-17 RX ADMIN — Medication 5 MILLIGRAM(S): at 22:57

## 2022-02-17 RX ADMIN — Medication 12.5 MILLIGRAM(S): at 05:18

## 2022-02-17 RX ADMIN — Medication 100 MILLIGRAM(S): at 05:19

## 2022-02-17 RX ADMIN — Medication 100 MILLIGRAM(S): at 15:09

## 2022-02-17 RX ADMIN — Medication 100 MILLIGRAM(S): at 22:59

## 2022-02-17 RX ADMIN — OXYCODONE HYDROCHLORIDE 5 MILLIGRAM(S): 5 TABLET ORAL at 06:00

## 2022-02-17 RX ADMIN — Medication 60 MILLIGRAM(S): at 05:18

## 2022-02-17 RX ADMIN — ATORVASTATIN CALCIUM 40 MILLIGRAM(S): 80 TABLET, FILM COATED ORAL at 22:58

## 2022-02-17 RX ADMIN — Medication 60 MILLIGRAM(S): at 12:49

## 2022-02-17 RX ADMIN — PANTOPRAZOLE SODIUM 40 MILLIGRAM(S): 20 TABLET, DELAYED RELEASE ORAL at 05:19

## 2022-02-17 NOTE — PROGRESS NOTE ADULT - SUBJECTIVE AND OBJECTIVE BOX
[facility]                                                INFECTIOUS DISEASES  =======================================================                               Ulices Muñoz MD#  Clifford Jackson MD*                                     Lane Vargas MD*    Veronica Mccracken MD*            Diplomates American Board of Internal Medicine & Infectious Diseases                  # [place] Office - Appt - Tel  [phone] Fax [phone]                * [place] Office - Appt - Tel [phone] Fax [phone]                                  Hospital Consult line:  [phone]  =======================================================      [id number]  [facility]  follow up: bacteremia,  bacteriuria, skin wounds    no new issues.   on medical floor        I have personally reviewed the labs and data; pertinent labs and data are listed in this note; please see below.   =======================================================  Past Medical & Surgical Hx:  =====================  PAST MEDICAL & SURGICAL HISTORY:    Problem List:  ==========  HEALTH ISSUES - PROBLEM Dx:       Social Hx:  =======  no toxic habits currently    FAMILY HISTORY:  no significant family history of immunosuppressive disorders in mother or father   =======================================================    REVIEW OF SYSTEMS:  Limited due to medical condition    =======================================================  Allergies  No Known Allergies    ======================================================  Physical Exam:  ============    General:  No acute distress.  Obese  Eye: Pupils are equal, round and reactive to light, Extraocular movements are intact, Normal conjunctiva.  HENT: Normocephalic, Oral mucosa is dry  NGT in place  Neck: Supple, No lymphadenopathy.  Respiratory: Lungs with fair aeration  Cardiovascular: Normal rate, Regular rhythm,   Gastrointestinal: Soft, Non-tender, Non-distended, Normal bowel sounds.  Genitourinary: No costovertebral angle tenderness.  Lymphatics: No lymphadenopathy neck,   Musculoskeletal: Normal range of motion, Normal strength.  BILATERAL WRIST RESTRAINTS  Integumentary:   MULTIPLE skin ABRASIONS and dried bullae on the bilateral upper thighs., improving   dressings in place  Neurologic:  Limited due to medical condition   ===================================================  Vitals:  ============  T(F): 98.1 (17 Feb 2022 09:40), Max: 98.7 (16 Feb 2022 18:32)  HR: 81 (17 Feb 2022 09:40)  BP: 132/84 (17 Feb 2022 09:40)  RR: 18 (17 Feb 2022 09:40)  SpO2: 95% (17 Feb 2022 09:40) (94% - 98%)  temp max in last 48H T(F): , Max: 99.1 (02-16-22 @ 00:00)    =======================================================  Current Antibiotics:  ceFAZolin   IVPB 2000 milliGRAM(s) IV Intermittent every 8 hours    Other medications:  diltiazem    Tablet 60 milliGRAM(s) Oral every 6 hours  heparin  Infusion. 1100 Unit(s)/Hr IV Continuous <Continuous>  metoprolol tartrate 12.5 milliGRAM(s) Oral every 12 hours  oxybutynin 10 milliGRAM(s) Oral daily  oxyCODONE    IR 5 milliGRAM(s) Oral two times a day  pantoprazole  Injectable 40 milliGRAM(s) IV Push every 12 hours  petrolatum Ophthalmic Ointment 1 Application(s) Both EYES two times a day      =======================================================  Labs:                        9.4    19.55 )-----------( 256      ( 17 Feb 2022 07:14 )             29.5     02-16    141  |  104  |  17.4  ----------------------------<  132<H>  3.7   |  27.0  |  0.56    Ca    8.5<L>      16 Feb 2022 23:55  Phos  2.7     02-16  Mg     2.3     02-16    TPro  6.2<L>  /  Alb  2.9<L>  /  TBili  0.3<L>  /  DBili  x   /  AST  48<H>  /  ALT  27  /  AlkPhos  170<H>  02-16      Culture - Blood (collected 02-14-22 @ 22:04)  Source: .Blood Blood    Culture - Blood (collected 02-12-22 @ 15:31)  Source: .Blood Blood-Peripheral    Culture - Blood (collected 02-12-22 @ 15:31)  Source: .Blood Blood-Peripheral    Culture - Urine (collected 02-11-22 @ 22:15)  Source: Clean Catch Clean Catch (Midstream)  Final Report (02-14-22 @ 20:48):    >100,000 CFU/ml Escherichia coli  Organism: Escherichia coli (02-14-22 @ 20:48)  Organism: Escherichia coli (02-14-22 @ 20:48)    Sensitivities:      -  Amikacin: S <=16      -  Amoxicillin/Clavulanic Acid: S <=8/4      -  Ampicillin: R >16 These ampicillin results predict results for amoxicillin      -  Ampicillin/Sulbactam: S 8/4 Enterobacter, Klebsiella aerogenes, Citrobacter, and Serratia may develop resistance during prolonged therapy (3-4 days)      -  Aztreonam: S <=4      -  Cefazolin: S <=2 (MIC_CL_COM_ENTERIC_[id number]) For uncomplicated UTI with K. pneumoniae, E. coli, or P. mirablis: SKY <=16 is sensitive and SKY >=32 is resistant. This also predicts results for oral agents cefaclor, cefdinir, cefpodoxime, cefprozil, cefuroxime axetil, cephalexin and locarbef for uncomplicated UTI. Note that some isolates may be susceptible to these agents while testing resistant to cefazolin.      -  Cefepime: S <=2      -  Cefoxitin: S <=8      -  Ceftriaxone: S <=1 Enterobacter, Klebsiella aerogenes, Citrobacter, and Serratia may develop resistance during prolonged therapy      -  Ciprofloxacin: I 0.5      -  Ertapenem: S <=0.5      -  Gentamicin: S <=2      -  Imipenem: S <=1      -  Levofloxacin: S <=0.5      -  Meropenem: S <=1      -  Nitrofurantoin: S <=32 Should not be used to treat pyelonephritis      -  Piperacillin/Tazobactam: S <=8      -  Tigecycline: S <=2      -  Tobramycin: S <=2      -  Trimethoprim/Sulfamethoxazole: S <=0.5/9.5      Method Type: SKY    Culture - Blood (collected 02-11-22 @ 15:28)  Source: .Blood Blood-Peripheral  Gram Stain (02-12-22 @ 10:18):    Growth in aerobic and anaerobic bottles: Gram Positive Cocci in Clusters    Gram Stain performed by:    [facility]    [address] [place] [id number]    .    TYPE: (C=Critical, N=Notification, A=Abnormal) C    TESTS:  _GS    DATE/TIME CALLED: _ 02/12/2022 10:17:53    CALLED TO: Nani Caputo RN    READ BACK (2 Patient Identifiers)(Y/N): _ Y    READ BACK VALUES (Y/N): _ Y    CALLED BY: _ Sajaz  Final Report (02-14-22 @ 13:22):    Growth in aerobic and anaerobic bottles: Staphylococcus epidermidis  Organism: Staphylococcus epidermidis  Staphylococcus epidermidis (02-14-22 @ 13:22)  Organism: Staphylococcus epidermidis (02-14-22 @ 13:22)    Sensitivities:      -  Ampicillin/Sulbactam: S <=8/4      -  Cefazolin: S <=4      -  Clindamycin: S <=0.25      -  Erythromycin: R >4      -  Gentamicin: S <=1 Should not be used as monotherapy      -  Oxacillin: S <=0.25      -  Penicillin: R 4      -  Rifampin: S <=1 Should not be used as monotherapy      -  Tetra/Doxy: S <=1      -  Trimethoprim/Sulfamethoxazole: S <=0.5/9.5      -  Vancomycin: S 2      Method Type: SKY  Organism: Staphylococcus epidermidis (02-14-22 @ 13:22)    Sensitivities:      -  Ampicillin/Sulbactam: S <=8/4      -  Cefazolin: S <=4      -  Clindamycin: R <=0.25 This isolate is presumed to be clindamycin resistant based on detection of inducible resistance. Clindamycin may still be effective in some patients.      -  Erythromycin: R >4      -  Gentamicin: S <=1 Should not be used as monotherapy      -  Oxacillin: S <=0.25      -  Penicillin: R 4      -  Rifampin: S <=1 Should not be used as monotherapy      -  Tetra/Doxy: S <=1      -  Trimethoprim/Sulfamethoxazole: S <=0.5/9.5      -  Vancomycin: S 2      Method Type: SKY    Culture - Blood (collected 02-11-22 @ 15:27)  Source: .Blood Blood-Peripheral  Gram Stain (02-12-22 @ 13:47):    Growth in aerobic and anaerobic bottles: Gram Positive Cocci in Clusters    ***Blood Panel PCR results on this specimen are available    approximately 3 hours after the Gram stain result.***    Gram stain, PCR, and/or culture results may not always    correspond due to difference in methodologies.    ************************************************************    This PCR assay was performed using [employer].    The following targets are tested for: Enterococcus,    vancomycin resistant enterococci, Listeria monocytogenes,    coagulase negative staphylococci, S. aureus,    methicillin resistant S. aureus, Streptococcus agalactiae    (Group B), S. pneumoniae, S. pyogenes (Group A),    Acinetobacter baumannii, Enterobacter cloacae, E. coli,    Klebsiella oxytoca, K. pneumoniae, Proteus sp.,    Serratia marcescens, Haemophilus influenzae,    Neisseria meningitidis, Pseudomonas aeruginosa, Candida    albicans, C. glabrata, C krusei, C parapsilosis,    C. tropicalis and the KPC resistance gene.    Gram Stain and BCID performed by:    [facility]    [address] [place] [id number]    .    TYPE: (C=Critical, N=Notification, A=Abnormal) C    TESTS:  _ GS    DATE/TIME CALLED: _ 02/12/2022 07:33:46    CALLED TO: Nani Caputo RN    READ BACK (2Patient Identifiers)(Y/N): _ Y    READ BACK VALUES (Y/N): _ Y    CALLED BY: Nani Gale  Final Report (02-14-22 @ 13:23):    Growth in aerobic and anaerobic bottles: Staphylococcus epidermidis    See previous culture [id number]  Organism: Blood Culture PCR (02-14-22 @ 13:23)  Organism: Blood Culture PCR (02-14-22 @ 13:23)    Sensitivities:      -  Coagulase negative Staphylococcus: Detec      Method Type: PCR              SARS-CoV-2: NotDetec (02-11-22 @ 15:23)      =======================================================        < from: MR Cervical Spine No Cont (02.15.22 @ 11:14) >    ACC: [id number] EXAM:  MR SPINE CERVICAL                          PROCEDURE DATE:  02/15/2022          INTERPRETATION:  INDICATION:   Neck pain. radiculopathy  TECHNIQUE:  Sagittal and axial imaging of the cervical spine was   conducted using a 1.5 Samira superconducting magnet.  T1 and T2 techniques   were incorporated.  COMPARISON EXAMINATION:  CT cervical 2/11/2022    FINDINGS:    ALIGNMENT:  Reversal of cervical lordosis is noted, suggesting spasm.   C1-C2 is unremarkable in alignment. AlignmentC1-C2 was normal on the   second CT performed on 2/11/2022.  VERTEBRAL BODIES:  No acute fracture or destructive lesion is depicted.  DISC SPACES:  Disc degeneration and disc space narrowing is noted from C3   to C7.  C2-3:  Unremarkable  C3-4:  A small broad-based herniation is more prevalent to the left.  C4-5:  A broad-based disc bulge is noted with sac effacement  C5-6:  A broad-based ridge disc complex is more prevalent to the left.  C6-7:  A broad-based disc bulge is noted that.  C7-T1:  Unremarkable  CANAL:  No tight bony stenosis noted.  FORAMINA:  Narrowed in the mid and lower cervical region  SPINAL CORD:  No cord compression suggested. No cord edema or changes of   myelopathy noted.  MISCELLANEOUS:  The prevertebral and paraspinal tissues are unremarkable.    The craniocervical junction is unremarkable in appearance.    IMPRESSION:  1. Reversal of cervical lordosis suggesting spasm. No subluxation noted at the C1-C2 level.  2. No fracture or bony destructive lesion identified.  3. Disc disease and degenerative changes noted from C3 to C7 inclusive of a small herniation at C3-4 to the left.  4. No cord edema or changes of myelopathy.    --- End of Report ---            BELTRAN GALLO MD; Attending Radiologist  This documenthas been electronically signed. Feb 15 2022 11:36AM    < end of copied text >      < from: MR Head No Cont (02.15.22 @ 11:18) >    ACC: [id number] EXAM:  MR BRAIN                          PROCEDURE DATE:  02/15/2022          INTERPRETATION:  INDICATION:    Left MCA infarct.  TECHNIQUE:  Multiplanar brain imaging was conducted. T1, T2 and FLAIR   imaging was performed.  In addition, diffusion imaging, diffusion   coefficient assessment (ADC) and T2* was incorporated . Post contrast   imaging was performed. 7.5 cc Gadavist was administered.  COMPARISON EXAMINATION:  CT brain 2/13/2022 CT brain 2/11/2022    FINDINGS:    HEMISPHERES: There are extensive diffusion abnormalities in the left MCA   territory, corresponding to the hypodensity seen on CT. There is no   microhemorrhage in the left basal ganglia in the region of acute and/or   recent subacute ischemia. Also there isdiffuse edema and diffusion   restriction in the left the MCA territory centered in the left posterior   frontal temporal and parietal region. There is also diffusion restriction   in the right PCA territory, and there are small foci of diffusion   abnormalities in the right frontal lobe in the MCA territory. Given the   multiple areas of involvement, findings suggests embolic or thrombotic   disease. Further workup and assessment recommended.  VENTRICLES:  midline and normal in size  POSTERIOR FOSSA:  There is an old right cerebellar infarct, with no acute   abnormality noted.  EXTRA-AXIAL:  No mass or collections are depicted.  VASCULATURE:  There is absence of flow void in the left internal carotid   artery, but there is reconstitution of the supraclinoid carotid via   collaterals.  SINUSES AND MASTOIDS:  Clear.  MISCELLANEOUS:  No orbital or pituitary abnormality noted.  No skull base   lesion suggested.    IMPRESSION:    1)  Large acute left MCA territory infarct, with absence of flow void in the left internal carotid artery. However also noted are diffusion abnormalities indicative of acute ischemia in the right PCA and right MCA   territories. This suggests embolic or thrombotic disease.  2)  further clinical correlation and follow-up recommended.    --- End of Report ---            BELTRAN GALLO MD; Attending Radiologist  This document has been electronically signed. Feb 15 2022 11:21AM    < end of copied text >
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
## 2022-02-17 NOTE — SWALLOW VFSS/MBS ASSESSMENT ADULT - SLP GENERAL OBSERVATIONS
Pt recd awake/upright in stretcher in radiology, A&A Ox0, aphasic, reduced cognition/speech intelligibility, tolerating RA no overt distress, b/l wrist restraints in place, accompanied by HALI VazquezT removed prior to study.
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
## 2022-02-17 NOTE — SWALLOW BEDSIDE ASSESSMENT ADULT - SWALLOW EVAL: DIAGNOSIS
Mild oral dysphagia w/single consistency trialed puree, negatively impacted upon by reduced cognition w/suspected visual deficits, notable for reduced orientation to utensil, requiring ongoing cues/support to facilitate acceptance, w/good benefit. Unable to r/o pharyngeal dysphagia at the bedside, +delayed weak throat clear at completion of assessment.
Overall swallow profile negatively impacted upon by reduced cognition w/suspected visual deficits.

## 2022-02-17 NOTE — PROGRESS NOTE ADULT - SUBJECTIVE AND OBJECTIVE BOX
Chief Complaint: This is a 66y old woman patient being seen in follow-up consultation for upper GI bleed, melena    Interval HPI/ 24 hour events: Patient seen and evaluated at bedside, reporting no complaints. Telemetry with 11 beats of Vtach over night. Her hemoglobin 9.4 today, slight down trend from yesterday 9.6 gm. Patient denies nausea, vomiting, abdominal pain, chest pain, shortness of breath, hematemesis, hematochezia, melena. On Heparin gtt.       Review of Systems:  · ENMT: negative  · Respiratory and Thorax: negative  · Cardiovascular: negative  · Gastrointestinal: see above.  · Genitourinary:	negative  · Musculoskeletal: negative  · Neurological: negative  · Psychiatric: negative  · Hematology/Lymphatics: negative  · Endocrine: negative      PAST MEDICAL/SURGICAL HISTORY:    MEDICATIONS  (STANDING):  ceFAZolin   IVPB 2000 milliGRAM(s) IV Intermittent every 8 hours  diltiazem    Tablet 60 milliGRAM(s) Oral every 6 hours  heparin  Infusion. 1100 Unit(s)/Hr (11 mL/Hr) IV Continuous <Continuous>  metoprolol tartrate 12.5 milliGRAM(s) Oral every 12 hours  oxybutynin 10 milliGRAM(s) Oral daily  oxyCODONE    IR 5 milliGRAM(s) Oral two times a day  pantoprazole  Injectable 40 milliGRAM(s) IV Push every 12 hours  petrolatum Ophthalmic Ointment 1 Application(s) Both EYES two times a day    MEDICATIONS  (PRN):  acetaminophen    Suspension .. 650 milliGRAM(s) Oral every 6 hours PRN Mild Pain (1 - 3), Moderate Pain (4 - 6)    No Known Allergies    T(C): 36.9 (02-17-22 @ 04:32), Max: 37.1 (02-16-22 @ 18:32)  HR: 98 (02-17-22 @ 04:32) (71 - 98)  BP: 128/80 (02-17-22 @ 04:32) (112/73 - 128/80)  RR: 18 (02-17-22 @ 04:32) (18 - 20)  SpO2: 94% (02-17-22 @ 04:32) (94% - 98%)    I&O's Summary    16 Feb 2022 07:01  -  17 Feb 2022 07:00  --------------------------------------------------------  IN: 1133 mL / OUT: 1000 mL / NET: 133 mL      PHYSICAL EXAM:    Constitutional: No acute distress  Neuro: Awake alert, oriented to person  HEENT: PERRL, anicteric sclerae  Neck: supple, no JVD  CV: Irregular, +S1S2, +systolic murmur  Pulm/chest: lung sounds diminished bilaterally, no accessory muscle use noted  Abd: obese, soft, NT, ND, +BS  Ext: no Cyanosis, clubbing, BLE edematous, ecchymotic areas  Skin: warm, well perfused, no jaundice   Psych: calm, appropriate affect        LABS:               9.4    19.55 )-----------( 256      ( 02-17 @ 07:14 )             29.5                9.6    22.40 )-----------( 209      ( 02-16 @ 05:22 )             30.8                9.3    21.45 )-----------( 173      ( 02-15 @ 04:28 )             29.5                9.7    22.79 )-----------( 187      ( 02-14 @ 22:02 )             30.7       02-16    141  |  104  |  17.4  ----------------------------<  132<H>  3.7   |  27.0  |  0.56    Ca    8.5<L>      16 Feb 2022 23:55  Phos  2.7     02-16  Mg     2.3     02-16    TPro  6.2<L>  /  Alb  2.9<L>  /  TBili  0.3<L>  /  DBili  x   /  AST  48<H>  /  ALT  27  /  AlkPhos  170<H>  02-16    LIVER FUNCTIONS - ( 16 Feb 2022 05:21 )  Alb: 2.9 g/dL / Pro: 6.2 g/dL / ALK PHOS: 170 U/L / ALT: 27 U/L / AST: 48 U/L / GGT: x             PTT - ( 17 Feb 2022 07:14 )  PTT:85.7 sec      Culture - Blood (collected 14 Feb 2022 22:04)  Source: .Blood Blood  Preliminary Report (16 Feb 2022 23:00):    No growth at 48 hours    <

## 2022-02-17 NOTE — SWALLOW VFSS/MBS ASSESSMENT ADULT - ORAL PREP COMMENTS
reduced orientation to utensil, varied oral grading, decreased attention to bolus expectoration/anterior loss of trial
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
## 2022-02-17 NOTE — SWALLOW VFSS/MBS ASSESSMENT ADULT - ROSENBEK'S PENETRATION ASPIRATION SCALE
(1) no aspiration, contrast does not enter airway delayed sensation observed via cup sip only; 1- no penetration or aspiration noted via tsp/(7) contrast passes glottis, visible subglottic residue remains despite patient’s response (aspiration)
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
## 2022-02-17 NOTE — SWALLOW VFSS/MBS ASSESSMENT ADULT - ORAL PHASE
Reduced anterior - posterior transport/Uncontrolled bolus / spillover in pina-pharynx Uncontrolled bolus / spillover in pina-pharynx/Uncontrolled bolus / spillover in hypopharynx Reduced anterior - posterior transport/Uncontrolled bolus / spillover in pina-pharynx/Uncontrolled bolus / spillover in hypopharynx
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
## 2022-02-17 NOTE — PROGRESS NOTE ADULT - ASSESSMENT
The patient is a 66y Female who is followed by neurology because of stroke    Stroke  Left MCA stroke appears acute, right PCA stroke appears subacute  has afib with RVR, atrial appendage clot  on heparin    She has large stroke and is at risk for hemorrhage, however she also has high risk for recurrent embolic stroke if not anticoagulated    I would proceed with anticoagulation with extreme caution, continue heparin, hold and reverse,  STATHead CT if she has acute neurologic event.  Recommend PTT goal of 50-70  and avoid boluses of Heparin.  Awaiting PEG.

## 2022-02-17 NOTE — SWALLOW BEDSIDE ASSESSMENT ADULT - NS SPL SWALLOW CLINIC TRIAL FT
No further PO provided, will rx MBSV for objective view of oropharyngeal swallow prior to potential PEG placement

## 2022-02-17 NOTE — PROGRESS NOTE ADULT - SUBJECTIVE AND OBJECTIVE BOX
OVERNIGHT EVENTS: opens eyes and tries to interact     Present Symptoms:     Dyspnea: none   Nausea/Vomiting: No  Anxiety:  unable   Depression: unable   Fatigue: unable   Loss of appetite: unable   Constipation: none     Pain: none             Character-            Duration-            Effect-            Factors-            Frequency-            Location-            Severity-    Pain AD Score:  [email or web address] (press ctrl + left click to view)    Review of Systems: Reviewed                Unable to obtain due to poor mentation   All others negative    MEDICATIONS  (STANDING):  ceFAZolin   IVPB 2000 milliGRAM(s) IV Intermittent every 8 hours  diltiazem    Tablet 60 milliGRAM(s) Oral every 6 hours  heparin  Infusion. 1100 Unit(s)/Hr (11 mL/Hr) IV Continuous <Continuous>  metoprolol tartrate 12.5 milliGRAM(s) Oral every 12 hours  oxybutynin 10 milliGRAM(s) Oral daily  oxyCODONE    IR 5 milliGRAM(s) Oral two times a day  pantoprazole  Injectable 40 milliGRAM(s) IV Push every 12 hours  petrolatum Ophthalmic Ointment 1 Application(s) Both EYES two times a day    MEDICATIONS  (PRN):  acetaminophen    Suspension .. 650 milliGRAM(s) Oral every 6 hours PRN Mild Pain (1 - 3), Moderate Pain (4 - 6)    PHYSICAL EXAM:    Vital Signs Last 24 Hrs  T(C): 36.9 (17 Feb 2022 04:32), Max: 37.2 (16 Feb 2022 08:00)  T(F): 98.4 (17 Feb 2022 04:32), Max: 98.9 (16 Feb 2022 08:00)  HR: 98 (17 Feb 2022 04:32) (71 - 98)  BP: 128/80 (17 Feb 2022 04:32) (112/67 - 128/80)  BP(mean): --  RR: 18 (17 Feb 2022 04:32) (18 - 20)  SpO2: 94% (17 Feb 2022 04:32) (94% - 98%)    General: alert, opens eyes     Karnofsky:  20 %    HEENT: normal     Lungs: comfortable     CV: normal      GI: NG tube     : can    MSK: weakness      Skin: no rash    LABS:                      9.6    22.40 )-----------( 209      ( 16 Feb 2022 05:22 )             30.8     02-16    141  |  104  |  17.4  ----------------------------<  132<H>  3.7   |  27.0  |  0.56    Ca    8.5<L>      16 Feb 2022 23:55  Phos  2.7     02-16  Mg     2.3     02-16    TPro  6.2<L>  /  Alb  2.9<L>  /  TBili  0.3<L>  /  DBili  x   /  AST  48<H>  /  ALT  27  /  AlkPhos  170<H>  02-16    PTT - ( 17 Feb 2022 07:14 )  PTT:85.7 sec    I&O's Summary    16 Feb 2022 07:01  -  17 Feb 2022 07:00  --------------------------------------------------------  IN: 1133 mL / OUT: 1000 mL / NET: 133 mL    RADIOLOGY & ADDITIONAL STUDIES:    ADVANCE DIRECTIVES/TREATMENT PREFERENCES:  Full code
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
## 2022-02-17 NOTE — SWALLOW BEDSIDE ASSESSMENT ADULT - SLP GENERAL OBSERVATIONS
Pt recd awake/upright in bed, A&A Ox1 via yes/no prompt, aphasic, reduced cognition, L gaze preference, R neglect, b/l wrist restraints in place, +NGT, tolerating RA no overt distress.
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
## 2022-02-17 NOTE — SWALLOW VFSS/MBS ASSESSMENT ADULT - DIAGNOSTIC IMPRESSIONS
Overall swallow profile negatively impacted upon by reduced cognition & at times behavioral overlay.     Moderate oral dysphagia, marked by inconsistent orientation to utensil (impeded upon cognitive limitations w/further suspected visual deficits), varied oral grading & consistent anterior loss of trials (min-mod w/all PO except significant w/minced & moist solids). Reduced attention to bolus & A-P transit, increased time for bolus propulsion at times, w/premature pharyngeal entry noted to the valleculae w/foods, at times to the pyriforms w/liquids.     Moderate oral dysphagia. Pt with overall intact contractility, w/no stasis seen in pharynx. Intermittently reduced hyo=laryngeal elevation/excursion, epiglottic deflection & upper/lower airway protection.; Overall swallow profile negatively impacted upon by reduced cognition & at times behavioral overlay.     Moderate oral dysphagia, marked by inconsistent orientation to utensil (impeded upon by cognitive limitations w/further suspected visual deficits), varied oral grading & consistent anterior loss of trials (min-mod w/all PO except significant w/minced & moist solids). Reduced attention to bolus & A-P transit, increased time for bolus propulsion at times, w/premature pharyngeal entry noted to the valleculae w/foods, at times to the pyriforms w/liquids.     Moderate pharyngeal dysphagia. Pt with overall intact contractility, w/no stasis seen in pharynx. Intermittently reduced hyo-laryngeal elevation/excursion, epiglottic deflection & upper/lower airway protection. +Aspiration during the swallow (w/delayed sensation), seen with moderately thick liquids via cup sip. No further aspiration for remaining trials presented.    Although Pt w/no further penetration/aspiration, remains unsafe for PO diet initiation, due cognitive limitations, which impact overall swallow profile.

## 2022-02-18 ENCOUNTER — TRANSCRIPTION ENCOUNTER (OUTPATIENT)
Age: 67
End: 2022-02-18

## 2022-02-18 LAB
ANION GAP SERPL CALC-SCNC: 11 MMOL/L — SIGNIFICANT CHANGE UP (ref 5–17)
BUN SERPL-MCNC: 11.1 MG/DL — SIGNIFICANT CHANGE UP (ref 8–20)
CALCIUM SERPL-MCNC: 8.8 MG/DL — SIGNIFICANT CHANGE UP (ref 8.6–10.2)
CHLORIDE SERPL-SCNC: 103 MMOL/L — SIGNIFICANT CHANGE UP (ref 98–107)
CO2 SERPL-SCNC: 26 MMOL/L — SIGNIFICANT CHANGE UP (ref 22–29)
CREAT SERPL-MCNC: 0.5 MG/DL — SIGNIFICANT CHANGE UP (ref 0.5–1.3)
GLUCOSE BLDC GLUCOMTR-MCNC: 88 MG/DL — SIGNIFICANT CHANGE UP (ref 70–99)
GLUCOSE SERPL-MCNC: 98 MG/DL — SIGNIFICANT CHANGE UP (ref 70–99)
HCT VFR BLD CALC: 30 % — LOW (ref 34.5–45)
HGB BLD-MCNC: 9.5 G/DL — LOW (ref 11.5–15.5)
MAGNESIUM SERPL-MCNC: 2.3 MG/DL — SIGNIFICANT CHANGE UP (ref 1.6–2.6)
MCHC RBC-ENTMCNC: 27.3 PG — SIGNIFICANT CHANGE UP (ref 27–34)
MCHC RBC-ENTMCNC: 31.7 GM/DL — LOW (ref 32–36)
MCV RBC AUTO: 86.2 FL — SIGNIFICANT CHANGE UP (ref 80–100)
NRBC # BLD: 5 /100 WBCS — HIGH (ref 0–0)
PLATELET # BLD AUTO: 178 K/UL — SIGNIFICANT CHANGE UP (ref 150–400)
POTASSIUM SERPL-MCNC: 4.1 MMOL/L — SIGNIFICANT CHANGE UP (ref 3.5–5.3)
POTASSIUM SERPL-SCNC: 4.1 MMOL/L — SIGNIFICANT CHANGE UP (ref 3.5–5.3)
RBC # BLD: 3.48 M/UL — LOW (ref 3.8–5.2)
RBC # FLD: 18.5 % — HIGH (ref 10.3–14.5)
SODIUM SERPL-SCNC: 140 MMOL/L — SIGNIFICANT CHANGE UP (ref 135–145)
WBC # BLD: 16.7 K/UL — HIGH (ref 3.8–10.5)
WBC # FLD AUTO: 16.7 K/UL — HIGH (ref 3.8–10.5)

## 2022-02-18 PROCEDURE — 99233 SBSQ HOSP IP/OBS HIGH 50: CPT

## 2022-02-18 PROCEDURE — 43246 EGD PLACE GASTROSTOMY TUBE: CPT

## 2022-02-18 PROCEDURE — 99232 SBSQ HOSP IP/OBS MODERATE 35: CPT

## 2022-02-18 RX ORDER — SODIUM CHLORIDE 9 MG/ML
1000 INJECTION INTRAMUSCULAR; INTRAVENOUS; SUBCUTANEOUS
Refills: 0 | Status: DISCONTINUED | OUTPATIENT
Start: 2022-02-18 | End: 2022-02-22

## 2022-02-18 RX ADMIN — ATORVASTATIN CALCIUM 40 MILLIGRAM(S): 80 TABLET, FILM COATED ORAL at 23:17

## 2022-02-18 RX ADMIN — Medication 100 MILLIGRAM(S): at 23:17

## 2022-02-18 RX ADMIN — Medication 60 MILLIGRAM(S): at 00:59

## 2022-02-18 RX ADMIN — Medication 1.25 MILLIGRAM(S): at 10:09

## 2022-02-18 RX ADMIN — Medication 5 MILLIGRAM(S): at 17:39

## 2022-02-18 RX ADMIN — Medication 1 APPLICATION(S): at 17:39

## 2022-02-18 RX ADMIN — Medication 5 MILLIGRAM(S): at 06:55

## 2022-02-18 RX ADMIN — Medication 100 MILLIGRAM(S): at 15:22

## 2022-02-18 RX ADMIN — Medication 100 MILLIGRAM(S): at 06:54

## 2022-02-18 RX ADMIN — Medication 1.25 MILLIGRAM(S): at 23:17

## 2022-02-18 RX ADMIN — PANTOPRAZOLE SODIUM 40 MILLIGRAM(S): 20 TABLET, DELAYED RELEASE ORAL at 17:39

## 2022-02-18 RX ADMIN — Medication 1 APPLICATION(S): at 06:56

## 2022-02-18 RX ADMIN — PANTOPRAZOLE SODIUM 40 MILLIGRAM(S): 20 TABLET, DELAYED RELEASE ORAL at 06:55
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
## 2022-02-18 NOTE — PROGRESS NOTE ADULT - SUBJECTIVE AND OBJECTIVE BOX
OVERNIGHT EVENTS:     Present Symptoms:     Dyspnea: Mild Moderate Severe  Nausea/Vomiting: Yes No  Anxiety:  Yes No  Depression: Yes No  Fatigue: Yes No  Loss of appetite: Yes No  Constipation:     Pain:             Character-            Duration-            Effect-            Factors-            Frequency-            Location-            Severity-    Pain AD Score:  http://geriatrictoolkit.[email or web address]/painad.pdf (press ctrl + left click to view)    Review of Systems: Reviewed                     Negative:                     Positive:  Unable to obtain due to poor mentation   All others negative    MEDICATIONS  (STANDING):  atorvastatin 40 milliGRAM(s) Oral at bedtime  ceFAZolin   IVPB 2000 milliGRAM(s) IV Intermittent every 8 hours  diltiazem    Tablet 60 milliGRAM(s) Oral every 6 hours  enalaprilat Injectable 1.25 milliGRAM(s) IV Push every 12 hours  heparin  Infusion. 1100 Unit(s)/Hr (11 mL/Hr) IV Continuous <Continuous>  metoprolol tartrate Injectable 5 milliGRAM(s) IV Push every 6 hours  oxybutynin 10 milliGRAM(s) Oral daily  oxyCODONE    IR 5 milliGRAM(s) Oral two times a day  pantoprazole  Injectable 40 milliGRAM(s) IV Push every 12 hours  petrolatum Ophthalmic Ointment 1 Application(s) Both EYES two times a day  sodium chloride 0.9%. 1000 milliLiter(s) (50 mL/Hr) IV Continuous <Continuous>    MEDICATIONS  (PRN):  acetaminophen  Suppository .. 650 milliGRAM(s) Rectal every 6 hours PRN Temp greater or equal to 38C (100.4F), Mild Pain (1 - 3), Moderate Pain (4 - 6)  diltiazem Injectable 10 milliGRAM(s) IV Push every 6 hours PRN If HR remains > 120  ondansetron Injectable 4 milliGRAM(s) IV Push every 6 hours PRN Nausea and/or Vomiting      PHYSICAL EXAM:    Vital Signs Last 24 Hrs  T(C): 37.2 (18 Feb 2022 10:00), Max: 37.2 (18 Feb 2022 10:00)  T(F): 99 (18 Feb 2022 10:00), Max: 99 (18 Feb 2022 10:00)  HR: 99 (18 Feb 2022 10:00) (83 - 99)  BP: 129/80 (18 Feb 2022 10:00) (129/80 - 145/75)  BP(mean): --  RR: 19 (18 Feb 2022 10:00) (19 - 94)  SpO2: 94% (18 Feb 2022 10:00) (94% - 96%)    General: alert  oriented x ____ lethargic agitated                  cachexia  nonverbal  coma    Karnofsky:  %    HEENT: normal  dry mouth  ET tube/trach    Lungs: comfortable tachypnea/labored breathing  excessive secretions    CV: normal  tachycardia    GI: normal  distended  tender  no BS               PEG/NG/OG tube  constipation  last BM:     : normal  incontinent  oliguria/anuria  can    MSK: normal  weakness  edema             ambulatory  bedbound/wheelchair bound    Skin: normal  pressure ulcers- Stage_____  no rash    LABS:                        9.5    16.70 )-----------( 178      ( 18 Feb 2022 06:01 )             30.0     02-18    140  |  103  |  11.1  ----------------------------<  98  4.1   |  26.0  |  0.50    Ca    8.8      18 Feb 2022 06:01  Phos  2.7     02-16  Mg     2.3     02-18      PT/INR - ( 17 Feb 2022 13:01 )   PT: 12.6 sec;   INR: 1.09 ratio         PTT - ( 17 Feb 2022 07:14 )  PTT:85.7 sec    I&O's Summary    17 Feb 2022 07:01  -  18 Feb 2022 07:00  --------------------------------------------------------  IN: 1000 mL / OUT: 800 mL / NET: 200 mL        RADIOLOGY & ADDITIONAL STUDIES:    ADVANCE DIRECTIVES/TREATMENT PREFERENCES:  DNR YES NO  Completed on:                     MOLST  YES NO   Completed on:  Living Will  YES NO   Completed on: OVERNIGHT EVENTS:  PEG planned today     Present Symptoms:     Dyspnea: none   Nausea/Vomiting: No  Anxiety:  No  Depression: No  Fatigue: No  Loss of appetite: No  Constipation: none     Pain: none             Character-            Duration-            Effect-            Factors-            Frequency-            Location-            Severity-    Pain AD Score:  http://geriatrictoolkit.[email or web address]/painad.pdf (press ctrl + left click to view)    Review of Systems: Reviewed                     Negative: no chest pain                      All others negative    MEDICATIONS  (STANDING):  atorvastatin 40 milliGRAM(s) Oral at bedtime  ceFAZolin   IVPB 2000 milliGRAM(s) IV Intermittent every 8 hours  diltiazem    Tablet 60 milliGRAM(s) Oral every 6 hours  enalaprilat Injectable 1.25 milliGRAM(s) IV Push every 12 hours  heparin  Infusion. 1100 Unit(s)/Hr (11 mL/Hr) IV Continuous <Continuous>  metoprolol tartrate Injectable 5 milliGRAM(s) IV Push every 6 hours  oxybutynin 10 milliGRAM(s) Oral daily  oxyCODONE    IR 5 milliGRAM(s) Oral two times a day  pantoprazole  Injectable 40 milliGRAM(s) IV Push every 12 hours  petrolatum Ophthalmic Ointment 1 Application(s) Both EYES two times a day  sodium chloride 0.9%. 1000 milliLiter(s) (50 mL/Hr) IV Continuous <Continuous>    MEDICATIONS  (PRN):  acetaminophen  Suppository .. 650 milliGRAM(s) Rectal every 6 hours PRN Temp greater or equal to 38C (100.4F), Mild Pain (1 - 3), Moderate Pain (4 - 6)  diltiazem Injectable 10 milliGRAM(s) IV Push every 6 hours PRN If HR remains > 120  ondansetron Injectable 4 milliGRAM(s) IV Push every 6 hours PRN Nausea and/or Vomiting    PHYSICAL EXAM:    Vital Signs Last 24 Hrs  T(C): 37.2 (18 Feb 2022 10:00), Max: 37.2 (18 Feb 2022 10:00)  T(F): 99 (18 Feb 2022 10:00), Max: 99 (18 Feb 2022 10:00)  HR: 99 (18 Feb 2022 10:00) (83 - 99)  BP: 129/80 (18 Feb 2022 10:00) (129/80 - 145/75)  BP(mean): --  RR: 19 (18 Feb 2022 10:00) (19 - 94)  SpO2: 94% (18 Feb 2022 10:00) (94% - 96%)    General: alert     Karnofsky:  30 %    HEENT: normal      Lungs: comfortable     CV: normal      GI: normal      : can    MSK: bedbound/wheelchair bound    Skin: no rash    LABS:                      9.5    16.70 )-----------( 178      ( 18 Feb 2022 06:01 )             30.0     02-18    140  |  103  |  11.1  ----------------------------<  98  4.1   |  26.0  |  0.50    Ca    8.8      18 Feb 2022 06:01  Phos  2.7     02-16  Mg     2.3     02-18    PT/INR - ( 17 Feb 2022 13:01 )   PT: 12.6 sec;   INR: 1.09 ratio       PTT - ( 17 Feb 2022 07:14 )  PTT:85.7 sec    I&O's Summary    17 Feb 2022 07:01  -  18 Feb 2022 07:00  --------------------------------------------------------  IN: 1000 mL / OUT: 800 mL / NET: 200 mL    RADIOLOGY & ADDITIONAL STUDIES:    < from: MR Angio Head No Cont (02.15.22 @ 11:18) >    IMPRESSION:    1. Suboptimal study, degraded by motion.  2. Left internal carotid occlusion in the neck, without reconstitution   until the supraclinoid region. In conjunction, there is a left MCA   occlusion in the mid to distal left M1 region.  3. The right internal carotid appears to be patent, as are both vertebral   arteries.    See full description above.    CTA imaging of the head and neck may be considered for better delineation   and assessment.    --- End of Report ---    < end of copied text >    ADVANCE DIRECTIVES/TREATMENT PREFERENCES:  Full code

## 2022-02-19 LAB
ANION GAP SERPL CALC-SCNC: 12 MMOL/L — SIGNIFICANT CHANGE UP (ref 5–17)
APTT BLD: 28.1 SEC — SIGNIFICANT CHANGE UP (ref 27.5–35.5)
BUN SERPL-MCNC: 10.1 MG/DL — SIGNIFICANT CHANGE UP (ref 8–20)
CALCIUM SERPL-MCNC: 8.6 MG/DL — SIGNIFICANT CHANGE UP (ref 8.6–10.2)
CHLORIDE SERPL-SCNC: 104 MMOL/L — SIGNIFICANT CHANGE UP (ref 98–107)
CO2 SERPL-SCNC: 24 MMOL/L — SIGNIFICANT CHANGE UP (ref 22–29)
CREAT SERPL-MCNC: 0.49 MG/DL — LOW (ref 0.5–1.3)
CULTURE RESULTS: SIGNIFICANT CHANGE UP
GLUCOSE BLDC GLUCOMTR-MCNC: 107 MG/DL — HIGH (ref 70–99)
GLUCOSE BLDC GLUCOMTR-MCNC: 118 MG/DL — HIGH (ref 70–99)
GLUCOSE BLDC GLUCOMTR-MCNC: 61 MG/DL — LOW (ref 70–99)
GLUCOSE BLDC GLUCOMTR-MCNC: 68 MG/DL — LOW (ref 70–99)
GLUCOSE BLDC GLUCOMTR-MCNC: 88 MG/DL — SIGNIFICANT CHANGE UP (ref 70–99)
GLUCOSE BLDC GLUCOMTR-MCNC: 92 MG/DL — SIGNIFICANT CHANGE UP (ref 70–99)
GLUCOSE BLDC GLUCOMTR-MCNC: 93 MG/DL — SIGNIFICANT CHANGE UP (ref 70–99)
GLUCOSE SERPL-MCNC: 118 MG/DL — HIGH (ref 70–99)
HCT VFR BLD CALC: 29.8 % — LOW (ref 34.5–45)
HGB BLD-MCNC: 9.4 G/DL — LOW (ref 11.5–15.5)
MAGNESIUM SERPL-MCNC: 2.1 MG/DL — SIGNIFICANT CHANGE UP (ref 1.6–2.6)
MCHC RBC-ENTMCNC: 27.7 PG — SIGNIFICANT CHANGE UP (ref 27–34)
MCHC RBC-ENTMCNC: 31.5 GM/DL — LOW (ref 32–36)
MCV RBC AUTO: 87.9 FL — SIGNIFICANT CHANGE UP (ref 80–100)
NRBC # BLD: 2 /100 WBCS — HIGH (ref 0–0)
PLATELET # BLD AUTO: 235 K/UL — SIGNIFICANT CHANGE UP (ref 150–400)
POTASSIUM SERPL-MCNC: 4 MMOL/L — SIGNIFICANT CHANGE UP (ref 3.5–5.3)
POTASSIUM SERPL-SCNC: 4 MMOL/L — SIGNIFICANT CHANGE UP (ref 3.5–5.3)
RBC # BLD: 3.39 M/UL — LOW (ref 3.8–5.2)
RBC # FLD: 18.5 % — HIGH (ref 10.3–14.5)
SODIUM SERPL-SCNC: 140 MMOL/L — SIGNIFICANT CHANGE UP (ref 135–145)
SPECIMEN SOURCE: SIGNIFICANT CHANGE UP
WBC # BLD: 15.92 K/UL — HIGH (ref 3.8–10.5)
WBC # FLD AUTO: 15.92 K/UL — HIGH (ref 3.8–10.5)

## 2022-02-19 PROCEDURE — 99233 SBSQ HOSP IP/OBS HIGH 50: CPT

## 2022-02-19 PROCEDURE — 99232 SBSQ HOSP IP/OBS MODERATE 35: CPT

## 2022-02-19 RX ORDER — DEXTROSE 10 % IN WATER 10 %
125 INTRAVENOUS SOLUTION INTRAVENOUS ONCE
Refills: 0 | Status: COMPLETED | OUTPATIENT
Start: 2022-02-19 | End: 2022-02-19

## 2022-02-19 RX ORDER — ACETAMINOPHEN 500 MG
650 TABLET ORAL EVERY 6 HOURS
Refills: 0 | Status: DISCONTINUED | OUTPATIENT
Start: 2022-02-19 | End: 2022-02-22

## 2022-02-19 RX ORDER — DEXTROSE 50 % IN WATER 50 %
12.5 SYRINGE (ML) INTRAVENOUS ONCE
Refills: 0 | Status: DISCONTINUED | OUTPATIENT
Start: 2022-02-19 | End: 2022-02-22

## 2022-02-19 RX ORDER — METOPROLOL TARTRATE 50 MG
12.5 TABLET ORAL EVERY 12 HOURS
Refills: 0 | Status: DISCONTINUED | OUTPATIENT
Start: 2022-02-19 | End: 2022-02-22

## 2022-02-19 RX ADMIN — Medication 100 MILLIGRAM(S): at 14:46

## 2022-02-19 RX ADMIN — Medication 60 MILLIGRAM(S): at 11:50

## 2022-02-19 RX ADMIN — Medication 5 MILLIGRAM(S): at 06:10

## 2022-02-19 RX ADMIN — Medication 1 APPLICATION(S): at 06:11

## 2022-02-19 RX ADMIN — Medication 100 MILLIGRAM(S): at 23:15

## 2022-02-19 RX ADMIN — Medication 10 MILLIGRAM(S): at 11:50

## 2022-02-19 RX ADMIN — PANTOPRAZOLE SODIUM 40 MILLIGRAM(S): 20 TABLET, DELAYED RELEASE ORAL at 18:20

## 2022-02-19 RX ADMIN — Medication 500 MILLILITER(S): at 01:24

## 2022-02-19 RX ADMIN — Medication 60 MILLIGRAM(S): at 00:27

## 2022-02-19 RX ADMIN — HEPARIN SODIUM 1100 UNIT(S)/HR: 5000 INJECTION INTRAVENOUS; SUBCUTANEOUS at 12:32

## 2022-02-19 RX ADMIN — Medication 5 MILLIGRAM(S): at 00:26

## 2022-02-19 RX ADMIN — Medication 1.25 MILLIGRAM(S): at 09:43

## 2022-02-19 RX ADMIN — SODIUM CHLORIDE 50 MILLILITER(S): 9 INJECTION INTRAMUSCULAR; INTRAVENOUS; SUBCUTANEOUS at 14:48

## 2022-02-19 RX ADMIN — Medication 60 MILLIGRAM(S): at 18:20

## 2022-02-19 RX ADMIN — Medication 12.5 MILLIGRAM(S): at 18:20

## 2022-02-19 RX ADMIN — ATORVASTATIN CALCIUM 40 MILLIGRAM(S): 80 TABLET, FILM COATED ORAL at 23:11

## 2022-02-19 RX ADMIN — PANTOPRAZOLE SODIUM 40 MILLIGRAM(S): 20 TABLET, DELAYED RELEASE ORAL at 06:10

## 2022-02-19 RX ADMIN — Medication 1 APPLICATION(S): at 18:21

## 2022-02-19 RX ADMIN — Medication 60 MILLIGRAM(S): at 06:10

## 2022-02-19 RX ADMIN — OXYCODONE HYDROCHLORIDE 5 MILLIGRAM(S): 5 TABLET ORAL at 18:20

## 2022-02-19 RX ADMIN — Medication 100 MILLIGRAM(S): at 06:08

## 2022-02-19 RX ADMIN — Medication 5 MILLIGRAM(S): at 11:50

## 2022-02-19 RX ADMIN — Medication 500 MILLILITER(S): at 06:33

## 2022-02-19 RX ADMIN — Medication 60 MILLIGRAM(S): at 23:12

## 2022-02-19 RX ADMIN — OXYCODONE HYDROCHLORIDE 5 MILLIGRAM(S): 5 TABLET ORAL at 06:29

## 2022-02-19 NOTE — PROGRESS NOTE ADULT - PROBLEM SELECTOR PLAN 2
- S/P left MCA infarct. MRI showed left carotid occlusion  NGT in place for feeds and meds now  On IV heparin for afib and left atrial thrombus  can consider PEG tomorrow if family is agreeable and once cleared by cardiologist.   Tube feeds to d/c after midnight    Will need to check COVID PCR. Heparin needed to be held 6 hours prior to the procedure.   Will need to check INR /PTT the am of the procedure  Will discuss with the medicine team
S/P left MCA infarct. MRI showed left carotid occlusion. On IV Heparin drip for afib and left atrial thrombus  S/p PEG tube placement yesterday.  PEG site appears clean and dry.  PEG tube appears clean and dry w/o any visible blood  Can start tube feeds as ordered.   No contraindications to restarting IV Heparin gtt.   At this point, No further GI interventions. Will only see PRN at your request.
- S/P left MCA infarct. MRI showed left carotid occlussion  NGT in place for feeds and meds now  On IV heparin for afib and left atrial thrombus  can consider PEG on Friday if family is agreeable , will need cardiac clearance. Will need to check covid and hold heparin 6 hours before PEG. Will need to check INR /PTT the am of the procedure

## 2022-02-19 NOTE — PROGRESS NOTE ADULT - ASSESSMENT
The patient is a 66y Female who is followed by neurology because of stroke    Stroke  Left MCA stroke appears acute, right PCA stroke appears subacute  has afib with RVR, atrial appendage clot  on heparin    She has large stroke and is at risk for hemorrhage, however she also has high risk for recurrent embolic stroke if not anticoagulated    I would proceed with anticoagulation with extreme caution, continue heparin, hold and reverse,  STATHead CT if she has acute neurologic event.  Recommend PTT goal of 50-70  and avoid boluses of Heparin.  S/P PEG on 2-18-22.

## 2022-02-19 NOTE — DIETITIAN NUTRITION RISK NOTIFICATION - TREATMENT: THE FOLLOWING DIET HAS BEEN RECOMMENDED
Diet, NPO with Tube Feed:   Tube Feeding Modality: Gastrostomy  Jevity 1.5 Arnold (JEVITY1.5)  Continuous  Starting Tube Feed Rate {mL per Hour}: 10  Increase Tube Feed Rate by (mL): 10     Every 4 hours  Until Goal Tube Feed Rate (mL per Hour): 40  Tube Feed Duration (in Hours): 24  Tube Feed Start Time: 09:00 (02-19-22 @ 07:10) [Active]

## 2022-02-19 NOTE — PROGRESS NOTE ADULT - PROBLEM SELECTOR PLAN 1
Hemoglobin stable, today 9.4 gm, no evidence of overt bleeding. On Heparin gtt.   continue PPI BID   May give tube feeds with free water bolus TID
Now resolved. Hemoglobin remains stable at 9.4. Heparin drip restarted. VSS.  Continue to trend CBC, transfuse as needed.   Continue PPI BID
hemoglobin stable  continue PPI BID   May give tube feeds with free water bolus TID
hemoglobin stable  continue PPI  no  EGD at this time
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
## 2022-02-19 NOTE — DIETITIAN NUTRITION RISK NOTIFICATION - ADDITIONAL COMMENTS/DIETITIAN RECOMMENDATIONS
Suggest initiate Pivot 1.5 via PEG when feasible @ 20 ml/hr and advance 10 ml/hr q4 hrs until goal rate of 65 ml/hr (x20 hrs) to provide 1300 ml, 1950 kcal, 122g protein, 987 ml free water, and >100% of RDIs for vitamins/minerals. Additional free water per MD discretion.   Monitor tube feed tolerance.  Bowel regimen PRN.  Obtain daily weights to monitor trends.
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
## 2022-02-20 LAB
ANION GAP SERPL CALC-SCNC: 9 MMOL/L — SIGNIFICANT CHANGE UP (ref 5–17)
APTT BLD: 104 SEC — HIGH (ref 27.5–35.5)
APTT BLD: 135 SEC — CRITICAL HIGH (ref 27.5–35.5)
APTT BLD: 32.2 SEC — SIGNIFICANT CHANGE UP (ref 27.5–35.5)
APTT BLD: 66.5 SEC — HIGH (ref 27.5–35.5)
BUN SERPL-MCNC: 8.6 MG/DL — SIGNIFICANT CHANGE UP (ref 8–20)
CALCIUM SERPL-MCNC: 8.5 MG/DL — LOW (ref 8.6–10.2)
CHLORIDE SERPL-SCNC: 101 MMOL/L — SIGNIFICANT CHANGE UP (ref 98–107)
CO2 SERPL-SCNC: 25 MMOL/L — SIGNIFICANT CHANGE UP (ref 22–29)
CREAT SERPL-MCNC: 0.43 MG/DL — LOW (ref 0.5–1.3)
GLUCOSE SERPL-MCNC: 117 MG/DL — HIGH (ref 70–99)
HCT VFR BLD CALC: 29.3 % — LOW (ref 34.5–45)
HGB BLD-MCNC: 9.3 G/DL — LOW (ref 11.5–15.5)
MCHC RBC-ENTMCNC: 27.1 PG — SIGNIFICANT CHANGE UP (ref 27–34)
MCHC RBC-ENTMCNC: 31.7 GM/DL — LOW (ref 32–36)
MCV RBC AUTO: 85.4 FL — SIGNIFICANT CHANGE UP (ref 80–100)
NRBC # BLD: 2 /100 WBCS — HIGH (ref 0–0)
PLATELET # BLD AUTO: 209 K/UL — SIGNIFICANT CHANGE UP (ref 150–400)
POTASSIUM SERPL-MCNC: 3.5 MMOL/L — SIGNIFICANT CHANGE UP (ref 3.5–5.3)
POTASSIUM SERPL-SCNC: 3.5 MMOL/L — SIGNIFICANT CHANGE UP (ref 3.5–5.3)
RBC # BLD: 3.43 M/UL — LOW (ref 3.8–5.2)
RBC # FLD: 18.6 % — HIGH (ref 10.3–14.5)
SODIUM SERPL-SCNC: 135 MMOL/L — SIGNIFICANT CHANGE UP (ref 135–145)
WBC # BLD: 15.84 K/UL — HIGH (ref 3.8–10.5)
WBC # FLD AUTO: 15.84 K/UL — HIGH (ref 3.8–10.5)

## 2022-02-20 PROCEDURE — 99233 SBSQ HOSP IP/OBS HIGH 50: CPT

## 2022-02-20 PROCEDURE — 99231 SBSQ HOSP IP/OBS SF/LOW 25: CPT

## 2022-02-20 RX ADMIN — ATORVASTATIN CALCIUM 40 MILLIGRAM(S): 80 TABLET, FILM COATED ORAL at 22:25

## 2022-02-20 RX ADMIN — Medication 100 MILLIGRAM(S): at 22:25

## 2022-02-20 RX ADMIN — PANTOPRAZOLE SODIUM 40 MILLIGRAM(S): 20 TABLET, DELAYED RELEASE ORAL at 18:13

## 2022-02-20 RX ADMIN — OXYCODONE HYDROCHLORIDE 5 MILLIGRAM(S): 5 TABLET ORAL at 06:38

## 2022-02-20 RX ADMIN — HEPARIN SODIUM 0 UNIT(S)/HR: 5000 INJECTION INTRAVENOUS; SUBCUTANEOUS at 16:47

## 2022-02-20 RX ADMIN — Medication 1 APPLICATION(S): at 18:13

## 2022-02-20 RX ADMIN — PANTOPRAZOLE SODIUM 40 MILLIGRAM(S): 20 TABLET, DELAYED RELEASE ORAL at 06:38

## 2022-02-20 RX ADMIN — Medication 10 MILLIGRAM(S): at 13:47

## 2022-02-20 RX ADMIN — Medication 1 APPLICATION(S): at 06:39

## 2022-02-20 RX ADMIN — Medication 60 MILLIGRAM(S): at 06:38

## 2022-02-20 RX ADMIN — Medication 12.5 MILLIGRAM(S): at 18:12

## 2022-02-20 RX ADMIN — Medication 100 MILLIGRAM(S): at 06:38

## 2022-02-20 RX ADMIN — Medication 60 MILLIGRAM(S): at 13:48

## 2022-02-20 RX ADMIN — SODIUM CHLORIDE 50 MILLILITER(S): 9 INJECTION INTRAMUSCULAR; INTRAVENOUS; SUBCUTANEOUS at 22:25

## 2022-02-20 RX ADMIN — Medication 60 MILLIGRAM(S): at 18:12

## 2022-02-20 RX ADMIN — OXYCODONE HYDROCHLORIDE 5 MILLIGRAM(S): 5 TABLET ORAL at 18:14

## 2022-02-20 RX ADMIN — Medication 12.5 MILLIGRAM(S): at 06:39

## 2022-02-20 RX ADMIN — SODIUM CHLORIDE 50 MILLILITER(S): 9 INJECTION INTRAMUSCULAR; INTRAVENOUS; SUBCUTANEOUS at 13:49

## 2022-02-20 RX ADMIN — Medication 100 MILLIGRAM(S): at 13:47

## 2022-02-20 RX ADMIN — HEPARIN SODIUM 1600 UNIT(S)/HR: 5000 INJECTION INTRAVENOUS; SUBCUTANEOUS at 02:17
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
## 2022-02-20 NOTE — PROGRESS NOTE ADULT - SUBJECTIVE AND OBJECTIVE BOX
MALU AREANS  66y  Female    Interval/overnight events/pt complaints:  Patient was seen and examined at the bedside. Patient is resting comfortably. Patient confused at baseline and unable to answer ROS questions.       MEDICATIONS  (STANDING):  atorvastatin 40 milliGRAM(s) Oral at bedtime  ceFAZolin   IVPB 2000 milliGRAM(s) IV Intermittent every 8 hours  dextrose 50% Injectable 12.5 Gram(s) IV Push once  diltiazem    Tablet 60 milliGRAM(s) Oral every 6 hours  heparin  Infusion. 1100 Unit(s)/Hr (11 mL/Hr) IV Continuous <Continuous>  metoprolol tartrate 12.5 milliGRAM(s) Oral every 12 hours  oxybutynin 10 milliGRAM(s) Oral daily  oxyCODONE    IR 5 milliGRAM(s) Oral two times a day  pantoprazole  Injectable 40 milliGRAM(s) IV Push every 12 hours  petrolatum Ophthalmic Ointment 1 Application(s) Both EYES two times a day  sodium chloride 0.9%. 1000 milliLiter(s) (50 mL/Hr) IV Continuous <Continuous>    MEDICATIONS  (PRN):  acetaminophen     Tablet .. 650 milliGRAM(s) Oral every 6 hours PRN Temp greater or equal to 38C (100.4F), Mild Pain (1 - 3)  diltiazem Injectable 10 milliGRAM(s) IV Push every 6 hours PRN If HR remains > 120  ondansetron Injectable 4 milliGRAM(s) IV Push every 6 hours PRN Nausea and/or Vomiting    AllergiesNo Known Allergies      Vital Signs Last 24 Hrs  T(C): 36.4 (20 Feb 2022 10:34), Max: 36.9 (20 Feb 2022 04:30)  T(F): 97.5 (20 Feb 2022 10:34), Max: 98.4 (20 Feb 2022 04:30)  HR: 103 (20 Feb 2022 10:34) (93 - 104)  BP: 140/70 (20 Feb 2022 04:30) (139/83 - 140/70)  BP(mean): --  RR: 17 (20 Feb 2022 10:34) (16 - 18)  SpO2: 97% (20 Feb 2022 10:34) (97% - 97%)  I&O's Summary    19 Feb 2022 07:01  -  20 Feb 2022 07:00  --------------------------------------------------------  IN: 1220 mL / OUT: 20 mL / NET: 1200 mL    PHYSICAL EXAM:  GENERAL:  NAD, Awake, Alert confused  CV: + s1,s2 regular rate and rhythm  Lungs: normal effort, CTA bilat. no wheezes, rales or rhonchi  ABDOMEN: +PEG. soft, nontender, nondistended  EXTREMITIES: + LE Dressings.  no edema, no cyanosis  Neuro: A&O x 1 to self. no sensory or motor deficit.      CAPILLARY BLOOD GLUCOSE  POCT Blood Glucose.: 107 mg/dL (19 Feb 2022 16:45)      LABS    (02-20 @ 07:52)                      9.3  15.84 )-----------( 209                 29.3      02-20    135  |  101  |  8.6  ----------------------------<  117<H>  3.5   |  25.0  |  0.43<L>    Ca    8.5<L>      20 Feb 2022 07:52  Mg     2.1     02-19      PTT:66.5 sec              IMAGING    Imaging Personally Reviewed:  [X] YES  [ ] NO    Consultant(s) Notes Reviewed:  [X] YES  [ ] NO  Care Discussed with Consultants/Other Providers [X] YES  [ ] NO    Lines/Tubes/Devices: PEG tube       MALU ARENAS  66y  Female    Interval/overnight events/pt complaints:  Patient was seen and examined at the bedside. Patient is resting comfortably. Patient confused at baseline and unable to answer ROS questions.     MEDICATIONS  (STANDING):  atorvastatin 40 milliGRAM(s) Oral at bedtime  ceFAZolin   IVPB 2000 milliGRAM(s) IV Intermittent every 8 hours  dextrose 50% Injectable 12.5 Gram(s) IV Push once  diltiazem    Tablet 60 milliGRAM(s) Oral every 6 hours  heparin  Infusion. 1100 Unit(s)/Hr (11 mL/Hr) IV Continuous <Continuous>  metoprolol tartrate 12.5 milliGRAM(s) Oral every 12 hours  oxybutynin 10 milliGRAM(s) Oral daily  oxyCODONE    IR 5 milliGRAM(s) Oral two times a day  pantoprazole  Injectable 40 milliGRAM(s) IV Push every 12 hours  petrolatum Ophthalmic Ointment 1 Application(s) Both EYES two times a day  sodium chloride 0.9%. 1000 milliLiter(s) (50 mL/Hr) IV Continuous <Continuous>    MEDICATIONS  (PRN):  acetaminophen     Tablet .. 650 milliGRAM(s) Oral every 6 hours PRN Temp greater or equal to 38C (100.4F), Mild Pain (1 - 3)  diltiazem Injectable 10 milliGRAM(s) IV Push every 6 hours PRN If HR remains > 120  ondansetron Injectable 4 milliGRAM(s) IV Push every 6 hours PRN Nausea and/or Vomiting    AllergiesNo Known Allergies      Vital Signs Last 24 Hrs  T(C): 36.4 (20 Feb 2022 10:34), Max: 36.9 (20 Feb 2022 04:30)  T(F): 97.5 (20 Feb 2022 10:34), Max: 98.4 (20 Feb 2022 04:30)  HR: 103 (20 Feb 2022 10:34) (93 - 104)  BP: 140/70 (20 Feb 2022 04:30) (139/83 - 140/70)  BP(mean): --  RR: 17 (20 Feb 2022 10:34) (16 - 18)  SpO2: 97% (20 Feb 2022 10:34) (97% - 97%)  I&O's Summary    19 Feb 2022 07:01  -  20 Feb 2022 07:00  --------------------------------------------------------  IN: 1220 mL / OUT: 20 mL / NET: 1200 mL    PHYSICAL EXAM:  GENERAL:  NAD, Awake, Alert confused  CV: + s1,s2 regular rate and rhythm  Lungs: normal effort, CTA bilat. no wheezes, rales or rhonchi  ABDOMEN: +PEG. soft, nontender, nondistended  EXTREMITIES: + LE Dressings.  no edema, no cyanosis  Neuro: A&O x 1 to self. no sensory or motor deficit.      CAPILLARY BLOOD GLUCOSE  POCT Blood Glucose.: 107 mg/dL (19 Feb 2022 16:45)      LABS    (02-20 @ 07:52)                      9.3  15.84 )-----------( 209                 29.3      02-20    135  |  101  |  8.6  ----------------------------<  117<H>  3.5   |  25.0  |  0.43<L>    Ca    8.5<L>      20 Feb 2022 07:52  Mg     2.1     02-19      PTT:66.5 sec      IMAGING    Imaging Personally Reviewed:  [X] YES  [ ] NO    Consultant(s) Notes Reviewed:  [X] YES  [ ] NO  Care Discussed with Consultants/Other Providers [X] YES  [ ] NO    Lines/Tubes/Devices: PEG tube

## 2022-02-20 NOTE — PROVIDER CONTACT NOTE (OTHER) - SITUATION
Pt noted with SBP in the 90s and hr 120-130s.
pt has a heparin gtt running at 11ml/hr, an APTT resulted of 32.2, as per the nomogram the rate should be increased by 5ml/hr and a bolus of 9000 units should be administered

## 2022-02-20 NOTE — PROVIDER CONTACT NOTE (CRITICAL VALUE NOTIFICATION) - ACTION/TREATMENT ORDERED:
Hold heparin gtt x1 hour and changed to 1100 units/hour ( follow full anticoagulation nomogram)
follow Heparin Nomogram Full Anticoagulation Titration Dosing
Held heparin for 60 minutes, resumed at 1400 units as per nomogram ( full anticoagulation)

## 2022-02-20 NOTE — PROVIDER CONTACT NOTE (OTHER) - ACTION/TREATMENT ORDERED:
no bolus given at this time and the rate should be increased to 16ml/hr as per DESTINEY Mcduffie
Bolus 500 cc ordered and one time dose of ofirmiv
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
## 2022-02-20 NOTE — PROGRESS NOTE ADULT - ATTENDING COMMENTS
as noted in the assessment and plan
surgery team/ GI to proceed with procedure as planned   resume anticoagulation/NOAC when feasible from surgical and neurological perspective   please call us back with questions or concerns.
I have seen and examined the patient and agree with the above assessment and plan. No overnight events. Note edited in detail. Patient is now medically stable for placement, but restraints must be removed. Wound care orders placed. Rest of the management as detailed above.
PEG placement yesterday at which time the upper endoscopy just revealed some gastric erythema and no other significant abnormalities.  There is nothing that would identify any source of prior bleeding.  This morning she is afebrile with a normal hemoglobin and is back on her tube feedings which she is tolerating without difficulty.  The gastrostomy tube site appears clean without any discharge or bleeding.  There is a small incision superior to the gastrostomy tube site which was made initially for the gastrostomy tube but the position was suboptimal for placement of the gastrostomy tube and the new site was then utilized with the tube is now present.  Nothing further to do from a gastrointestinal standpoint and we will only see the patient as needed your request.
agree with above, per primary team plan for swallow eval today, will tentatively plan for PEG tomorrow, if fails swallow eval and pending cardio clearance. Will need to hold off Heparin gtt for 6 hrs prior to procedure, will need to be npo after midnight.    Reviewed labs/notes. Plan discussed with NP and IM attending.

## 2022-02-21 LAB
ANION GAP SERPL CALC-SCNC: 12 MMOL/L — SIGNIFICANT CHANGE UP (ref 5–17)
APTT BLD: 54.6 SEC — HIGH (ref 27.5–35.5)
BASOPHILS # BLD AUTO: 0.09 K/UL — SIGNIFICANT CHANGE UP (ref 0–0.2)
BASOPHILS NFR BLD AUTO: 0.5 % — SIGNIFICANT CHANGE UP (ref 0–2)
BUN SERPL-MCNC: 8 MG/DL — SIGNIFICANT CHANGE UP (ref 8–20)
CALCIUM SERPL-MCNC: 8.4 MG/DL — LOW (ref 8.6–10.2)
CHLORIDE SERPL-SCNC: 103 MMOL/L — SIGNIFICANT CHANGE UP (ref 98–107)
CO2 SERPL-SCNC: 24 MMOL/L — SIGNIFICANT CHANGE UP (ref 22–29)
CREAT SERPL-MCNC: 0.44 MG/DL — LOW (ref 0.5–1.3)
EOSINOPHIL # BLD AUTO: 0.36 K/UL — SIGNIFICANT CHANGE UP (ref 0–0.5)
EOSINOPHIL NFR BLD AUTO: 2 % — SIGNIFICANT CHANGE UP (ref 0–6)
FERRITIN SERPL-MCNC: 110 NG/ML — SIGNIFICANT CHANGE UP (ref 15–150)
GLUCOSE BLDC GLUCOMTR-MCNC: 105 MG/DL — HIGH (ref 70–99)
GLUCOSE SERPL-MCNC: 98 MG/DL — SIGNIFICANT CHANGE UP (ref 70–99)
HCT VFR BLD CALC: 31 % — LOW (ref 34.5–45)
HGB BLD-MCNC: 9.6 G/DL — LOW (ref 11.5–15.5)
IMM GRANULOCYTES NFR BLD AUTO: 1.8 % — HIGH (ref 0–1.5)
IRON SATN MFR SERPL: 18 UG/DL — LOW (ref 37–145)
IRON SATN MFR SERPL: 7 % — LOW (ref 14–50)
LYMPHOCYTES # BLD AUTO: 17.4 % — SIGNIFICANT CHANGE UP (ref 13–44)
LYMPHOCYTES # BLD AUTO: 3.12 K/UL — SIGNIFICANT CHANGE UP (ref 1–3.3)
MAGNESIUM SERPL-MCNC: 1.9 MG/DL — SIGNIFICANT CHANGE UP (ref 1.6–2.6)
MCHC RBC-ENTMCNC: 27 PG — SIGNIFICANT CHANGE UP (ref 27–34)
MCHC RBC-ENTMCNC: 31 GM/DL — LOW (ref 32–36)
MCV RBC AUTO: 87.3 FL — SIGNIFICANT CHANGE UP (ref 80–100)
MONOCYTES # BLD AUTO: 1.78 K/UL — HIGH (ref 0–0.9)
MONOCYTES NFR BLD AUTO: 9.9 % — SIGNIFICANT CHANGE UP (ref 2–14)
NEUTROPHILS # BLD AUTO: 12.25 K/UL — HIGH (ref 1.8–7.4)
NEUTROPHILS NFR BLD AUTO: 68.4 % — SIGNIFICANT CHANGE UP (ref 43–77)
PHOSPHATE SERPL-MCNC: 3.1 MG/DL — SIGNIFICANT CHANGE UP (ref 2.4–4.7)
PLATELET # BLD AUTO: 413 K/UL — HIGH (ref 150–400)
POTASSIUM SERPL-MCNC: 3.4 MMOL/L — LOW (ref 3.5–5.3)
POTASSIUM SERPL-SCNC: 3.4 MMOL/L — LOW (ref 3.5–5.3)
RBC # BLD: 3.55 M/UL — LOW (ref 3.8–5.2)
RBC # FLD: 18.7 % — HIGH (ref 10.3–14.5)
SODIUM SERPL-SCNC: 139 MMOL/L — SIGNIFICANT CHANGE UP (ref 135–145)
TIBC SERPL-MCNC: 276 UG/DL — SIGNIFICANT CHANGE UP (ref 220–430)
TRANSFERRIN SERPL-MCNC: 193 MG/DL — SIGNIFICANT CHANGE UP (ref 192–382)
WBC # BLD: 18.03 K/UL — HIGH (ref 3.8–10.5)
WBC # FLD AUTO: 18.03 K/UL — HIGH (ref 3.8–10.5)

## 2022-02-21 PROCEDURE — 99231 SBSQ HOSP IP/OBS SF/LOW 25: CPT

## 2022-02-21 PROCEDURE — 70450 CT HEAD/BRAIN W/O DYE: CPT | Mod: 26

## 2022-02-21 PROCEDURE — 99232 SBSQ HOSP IP/OBS MODERATE 35: CPT

## 2022-02-21 RX ORDER — FERROUS SULFATE 325(65) MG
300 TABLET ORAL DAILY
Refills: 0 | Status: DISCONTINUED | OUTPATIENT
Start: 2022-02-21 | End: 2022-02-22

## 2022-02-21 RX ORDER — POTASSIUM CHLORIDE 20 MEQ
40 PACKET (EA) ORAL ONCE
Refills: 0 | Status: DISCONTINUED | OUTPATIENT
Start: 2022-02-21 | End: 2022-02-22

## 2022-02-21 RX ORDER — POTASSIUM CHLORIDE 20 MEQ
40 PACKET (EA) ORAL ONCE
Refills: 0 | Status: DISCONTINUED | OUTPATIENT
Start: 2022-02-21 | End: 2022-02-21

## 2022-02-21 RX ORDER — APIXABAN 2.5 MG/1
5 TABLET, FILM COATED ORAL EVERY 12 HOURS
Refills: 0 | Status: DISCONTINUED | OUTPATIENT
Start: 2022-02-21 | End: 2022-02-22

## 2022-02-21 RX ADMIN — Medication 1 APPLICATION(S): at 22:16

## 2022-02-21 RX ADMIN — PANTOPRAZOLE SODIUM 40 MILLIGRAM(S): 20 TABLET, DELAYED RELEASE ORAL at 05:21

## 2022-02-21 RX ADMIN — Medication 300 MILLIGRAM(S): at 12:34

## 2022-02-21 RX ADMIN — Medication 10 MILLIGRAM(S): at 12:34

## 2022-02-21 RX ADMIN — APIXABAN 5 MILLIGRAM(S): 2.5 TABLET, FILM COATED ORAL at 18:07

## 2022-02-21 RX ADMIN — Medication 1 APPLICATION(S): at 05:22

## 2022-02-21 RX ADMIN — Medication 60 MILLIGRAM(S): at 00:49

## 2022-02-21 RX ADMIN — OXYCODONE HYDROCHLORIDE 5 MILLIGRAM(S): 5 TABLET ORAL at 18:08

## 2022-02-21 RX ADMIN — Medication 60 MILLIGRAM(S): at 05:22

## 2022-02-21 RX ADMIN — ATORVASTATIN CALCIUM 40 MILLIGRAM(S): 80 TABLET, FILM COATED ORAL at 22:16

## 2022-02-21 RX ADMIN — Medication 12.5 MILLIGRAM(S): at 05:22

## 2022-02-21 RX ADMIN — Medication 12.5 MILLIGRAM(S): at 18:08

## 2022-02-21 RX ADMIN — Medication 60 MILLIGRAM(S): at 18:07

## 2022-02-21 RX ADMIN — OXYCODONE HYDROCHLORIDE 5 MILLIGRAM(S): 5 TABLET ORAL at 05:22

## 2022-02-21 RX ADMIN — HEPARIN SODIUM 0 UNIT(S)/HR: 5000 INJECTION INTRAVENOUS; SUBCUTANEOUS at 00:11

## 2022-02-21 RX ADMIN — SODIUM CHLORIDE 50 MILLILITER(S): 9 INJECTION INTRAMUSCULAR; INTRAVENOUS; SUBCUTANEOUS at 18:09

## 2022-02-21 RX ADMIN — Medication 60 MILLIGRAM(S): at 12:34

## 2022-02-21 RX ADMIN — PANTOPRAZOLE SODIUM 40 MILLIGRAM(S): 20 TABLET, DELAYED RELEASE ORAL at 18:07

## 2022-02-21 NOTE — PROGRESS NOTE ADULT - ASSESSMENT
The patient is a 66y Female who is followed by neurology because of stroke    Stroke  Left MCA stroke appears acute, right PCA stroke appears subacute  has afib with RVR, atrial appendage clot  Now on apixaban 5 BID  S/P PEG on 2-18-22.

## 2022-02-21 NOTE — PROGRESS NOTE ADULT - NUTRITIONAL ASSESSMENT
This patient has been assessed with a concern for Malnutrition and has been determined to have a diagnosis/diagnoses of Moderate protein-calorie malnutrition.    This patient is being managed with:   Diet NPO with Tube Feed-  Tube Feeding Modality: Gastrostomy  Jevity 1.5 Arnold (JEVITY1.5)  Continuous  Starting Tube Feed Rate {mL per Hour}: 10  Increase Tube Feed Rate by (mL): 10     Every 4 hours  Until Goal Tube Feed Rate (mL per Hour): 40  Tube Feed Duration (in Hours): 24  Tube Feed Start Time: 09:00  Entered: Feb 19 2022  7:09AM    
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
## 2022-02-21 NOTE — PROGRESS NOTE ADULT - SUBJECTIVE AND OBJECTIVE BOX
CHIEF COMPLAINT/INTERVAL HISTORY:    Patient is a 66y old  Female who presents with a chief complaint of CVA / Atrial Appendage Clots / Cervical Subluxation / AF with RVR (20 Feb 2022 12:49)    SUBJECTIVE & OBJECTIVE: Pt seen and examined at bedside. No overnight events. Patient awake, dysarthric. Repeat CT head stable; therefore switch to Eliquis as discussed with Dr. Castañeda.    ROS: Unobtainable    ICU Vital Signs Last 24 Hrs  T(C): 37.2 (21 Feb 2022 09:03), Max: 37.2 (21 Feb 2022 09:03)  T(F): 99 (21 Feb 2022 09:03), Max: 99 (21 Feb 2022 09:03)  HR: 96 (21 Feb 2022 09:03) (80 - 96)  BP: 146/73 (21 Feb 2022 09:03) (124/69 - 146/73)  RR: 18 (21 Feb 2022 09:03) (18 - 19)  SpO2: 99% (21 Feb 2022 09:03) (93% - 99%)      MEDICATIONS  (STANDING):  apixaban 5 milliGRAM(s) Oral every 12 hours  atorvastatin 40 milliGRAM(s) Oral at bedtime  dextrose 50% Injectable 12.5 Gram(s) IV Push once  diltiazem    Tablet 60 milliGRAM(s) Oral every 6 hours  ferrous    sulfate Liquid 300 milliGRAM(s) Enteral Tube daily  metoprolol tartrate 12.5 milliGRAM(s) Oral every 12 hours  oxybutynin 10 milliGRAM(s) Oral daily  oxyCODONE    IR 5 milliGRAM(s) Oral two times a day  pantoprazole  Injectable 40 milliGRAM(s) IV Push every 12 hours  petrolatum Ophthalmic Ointment 1 Application(s) Both EYES two times a day  potassium chloride   Powder 40 milliEquivalent(s) Enteral Tube once  sodium chloride 0.9%. 1000 milliLiter(s) (50 mL/Hr) IV Continuous <Continuous>    MEDICATIONS  (PRN):  acetaminophen     Tablet .. 650 milliGRAM(s) Oral every 6 hours PRN Temp greater or equal to 38C (100.4F), Mild Pain (1 - 3)  diltiazem Injectable 10 milliGRAM(s) IV Push every 6 hours PRN If HR remains > 120  ondansetron Injectable 4 milliGRAM(s) IV Push every 6 hours PRN Nausea and/or Vomiting      LABS:                        9.6    18.03 )-----------( 413      ( 21 Feb 2022 07:07 )             31.0     02-21    139  |  103  |  8.0  ----------------------------<  98  3.4<L>   |  24.0  |  0.44<L>    Ca    8.4<L>      21 Feb 2022 07:07  Phos  3.1     02-21  Mg     1.9     02-21      PTT - ( 21 Feb 2022 07:04 )  PTT:54.6 sec      CAPILLARY BLOOD GLUCOSE      POCT Blood Glucose.: 105 mg/dL (21 Feb 2022 00:42)    PHYSICAL EXAM  General appearance: No acute distress, Awake, Alert  HEENT: Normocephalic, Atraumatic, Conjunctiva clear, EOMI  Neck: Supple, No JVD, No tenderness  Lungs: Breath sound equal bilaterally, No wheezes, No rales  Cardiovascular: S1S2, Regular rhythm  Abdomen: Soft, Nontender, Nondistended, No guarding/rebound, Positive bowel sounds  Extremities: No clubbing, No cyanosis, No edema, No calf tenderness, Lower extremity dressings in place  Neuro: No tremors, Right sided weakness  Psychiatric: Appropriate mood, Normal affect   CHIEF COMPLAINT/INTERVAL HISTORY:    Patient is a 66y old  Female who presents with a chief complaint of CVA / Atrial Appendage Clots / Cervical Subluxation / AF with RVR (20 Feb 2022 12:49)    SUBJECTIVE & OBJECTIVE: Pt seen and examined at bedside. No overnight events. Patient awake, dysarthric. Repeat CT head stable; therefore switch to Eliquis as discussed with Dr. Castañeda.    ROS: Unobtainable    ICU Vital Signs Last 24 Hrs  T(C): 37.2 (21 Feb 2022 09:03), Max: 37.2 (21 Feb 2022 09:03)  T(F): 99 (21 Feb 2022 09:03), Max: 99 (21 Feb 2022 09:03)  HR: 96 (21 Feb 2022 09:03) (80 - 96)  BP: 146/73 (21 Feb 2022 09:03) (124/69 - 146/73)  RR: 18 (21 Feb 2022 09:03) (18 - 19)  SpO2: 99% (21 Feb 2022 09:03) (93% - 99%)      MEDICATIONS  (STANDING):  apixaban 5 milliGRAM(s) Oral every 12 hours  atorvastatin 40 milliGRAM(s) Oral at bedtime  dextrose 50% Injectable 12.5 Gram(s) IV Push once  diltiazem    Tablet 60 milliGRAM(s) Oral every 6 hours  ferrous    sulfate Liquid 300 milliGRAM(s) Enteral Tube daily  metoprolol tartrate 12.5 milliGRAM(s) Oral every 12 hours  oxybutynin 10 milliGRAM(s) Oral daily  oxyCODONE    IR 5 milliGRAM(s) Oral two times a day  pantoprazole  Injectable 40 milliGRAM(s) IV Push every 12 hours  petrolatum Ophthalmic Ointment 1 Application(s) Both EYES two times a day  potassium chloride   Powder 40 milliEquivalent(s) Enteral Tube once  sodium chloride 0.9%. 1000 milliLiter(s) (50 mL/Hr) IV Continuous <Continuous>    MEDICATIONS  (PRN):  acetaminophen     Tablet .. 650 milliGRAM(s) Oral every 6 hours PRN Temp greater or equal to 38C (100.4F), Mild Pain (1 - 3)  diltiazem Injectable 10 milliGRAM(s) IV Push every 6 hours PRN If HR remains > 120  ondansetron Injectable 4 milliGRAM(s) IV Push every 6 hours PRN Nausea and/or Vomiting      LABS:                        9.6    18.03 )-----------( 413      ( 21 Feb 2022 07:07 )             31.0     02-21    139  |  103  |  8.0  ----------------------------<  98  3.4<L>   |  24.0  |  0.44<L>    Ca    8.4<L>      21 Feb 2022 07:07  Phos  3.1     02-21  Mg     1.9     02-21      PTT - ( 21 Feb 2022 07:04 )  PTT:54.6 sec      CAPILLARY BLOOD GLUCOSE      POCT Blood Glucose.: 105 mg/dL (21 Feb 2022 00:42)    PHYSICAL EXAM  General appearance: elderly female, laying in bed, NAD, awake but dysarthric  HEENT: Normocephalic, Atraumatic, Conjunctiva clear, EOMI  Neck: Supple   Lungs: coarse breath sounds  Cardiovascular: S1S2, Regular rate and rhythm  Abdomen: Soft, Nontender, obese  Extremities: left thigh abrasion noted; healing well without any drainage  Neuro: AAO x 1 (self), Dysarthric, Right sided weakness, Unable to follow commands
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
## 2022-02-22 VITALS
RESPIRATION RATE: 18 BRPM | HEART RATE: 89 BPM | OXYGEN SATURATION: 96 % | TEMPERATURE: 98 F | SYSTOLIC BLOOD PRESSURE: 124 MMHG | DIASTOLIC BLOOD PRESSURE: 78 MMHG

## 2022-02-22 DIAGNOSIS — F20.9 SCHIZOPHRENIA, UNSPECIFIED: ICD-10-CM

## 2022-02-22 DIAGNOSIS — I10 ESSENTIAL (PRIMARY) HYPERTENSION: ICD-10-CM

## 2022-02-22 DIAGNOSIS — E78.5 HYPERLIPIDEMIA, UNSPECIFIED: ICD-10-CM

## 2022-02-22 LAB
ANION GAP SERPL CALC-SCNC: 10 MMOL/L — SIGNIFICANT CHANGE UP (ref 5–17)
BASOPHILS # BLD AUTO: 0.05 K/UL — SIGNIFICANT CHANGE UP (ref 0–0.2)
BASOPHILS NFR BLD AUTO: 0.4 % — SIGNIFICANT CHANGE UP (ref 0–2)
BUN SERPL-MCNC: 5.4 MG/DL — LOW (ref 8–20)
CALCIUM SERPL-MCNC: 8.7 MG/DL — SIGNIFICANT CHANGE UP (ref 8.6–10.2)
CHLORIDE SERPL-SCNC: 105 MMOL/L — SIGNIFICANT CHANGE UP (ref 98–107)
CO2 SERPL-SCNC: 29 MMOL/L — SIGNIFICANT CHANGE UP (ref 22–29)
CREAT SERPL-MCNC: 0.44 MG/DL — LOW (ref 0.5–1.3)
EOSINOPHIL # BLD AUTO: 0.56 K/UL — HIGH (ref 0–0.5)
EOSINOPHIL NFR BLD AUTO: 4.6 % — SIGNIFICANT CHANGE UP (ref 0–6)
GLUCOSE BLDC GLUCOMTR-MCNC: 100 MG/DL — HIGH (ref 70–99)
GLUCOSE SERPL-MCNC: 83 MG/DL — SIGNIFICANT CHANGE UP (ref 70–99)
HCT VFR BLD CALC: 30 % — LOW (ref 34.5–45)
HGB BLD-MCNC: 9.4 G/DL — LOW (ref 11.5–15.5)
IMM GRANULOCYTES NFR BLD AUTO: 1.2 % — SIGNIFICANT CHANGE UP (ref 0–1.5)
LYMPHOCYTES # BLD AUTO: 24.9 % — SIGNIFICANT CHANGE UP (ref 13–44)
LYMPHOCYTES # BLD AUTO: 3.02 K/UL — SIGNIFICANT CHANGE UP (ref 1–3.3)
MAGNESIUM SERPL-MCNC: 1.9 MG/DL — SIGNIFICANT CHANGE UP (ref 1.8–2.6)
MCHC RBC-ENTMCNC: 27.6 PG — SIGNIFICANT CHANGE UP (ref 27–34)
MCHC RBC-ENTMCNC: 31.3 GM/DL — LOW (ref 32–36)
MCV RBC AUTO: 88.2 FL — SIGNIFICANT CHANGE UP (ref 80–100)
MONOCYTES # BLD AUTO: 1.51 K/UL — HIGH (ref 0–0.9)
MONOCYTES NFR BLD AUTO: 12.5 % — SIGNIFICANT CHANGE UP (ref 2–14)
NEUTROPHILS # BLD AUTO: 6.83 K/UL — SIGNIFICANT CHANGE UP (ref 1.8–7.4)
NEUTROPHILS NFR BLD AUTO: 56.4 % — SIGNIFICANT CHANGE UP (ref 43–77)
PHOSPHATE SERPL-MCNC: 3.6 MG/DL — SIGNIFICANT CHANGE UP (ref 2.4–4.7)
PLATELET # BLD AUTO: 489 K/UL — HIGH (ref 150–400)
POTASSIUM SERPL-MCNC: 3.6 MMOL/L — SIGNIFICANT CHANGE UP (ref 3.5–5.3)
POTASSIUM SERPL-SCNC: 3.6 MMOL/L — SIGNIFICANT CHANGE UP (ref 3.5–5.3)
PROCALCITONIN SERPL-MCNC: 0.08 NG/ML — SIGNIFICANT CHANGE UP (ref 0.02–0.1)
RBC # BLD: 3.4 M/UL — LOW (ref 3.8–5.2)
RBC # FLD: 19 % — HIGH (ref 10.3–14.5)
SARS-COV-2 RNA SPEC QL NAA+PROBE: SIGNIFICANT CHANGE UP
SODIUM SERPL-SCNC: 143 MMOL/L — SIGNIFICANT CHANGE UP (ref 135–145)
WBC # BLD: 12.11 K/UL — HIGH (ref 3.8–10.5)
WBC # FLD AUTO: 12.11 K/UL — HIGH (ref 3.8–10.5)

## 2022-02-22 PROCEDURE — 84466 ASSAY OF TRANSFERRIN: CPT

## 2022-02-22 PROCEDURE — 71260 CT THORAX DX C+: CPT | Mod: MA

## 2022-02-22 PROCEDURE — 85610 PROTHROMBIN TIME: CPT

## 2022-02-22 PROCEDURE — 83605 ASSAY OF LACTIC ACID: CPT

## 2022-02-22 PROCEDURE — 73701 CT LOWER EXTREMITY W/DYE: CPT | Mod: MA

## 2022-02-22 PROCEDURE — 87186 SC STD MICRODIL/AGAR DIL: CPT

## 2022-02-22 PROCEDURE — 84132 ASSAY OF SERUM POTASSIUM: CPT

## 2022-02-22 PROCEDURE — 99239 HOSP IP/OBS DSCHRG MGMT >30: CPT

## 2022-02-22 PROCEDURE — 83550 IRON BINDING TEST: CPT

## 2022-02-22 PROCEDURE — G1004: CPT

## 2022-02-22 PROCEDURE — U0003: CPT

## 2022-02-22 PROCEDURE — 99285 EMERGENCY DEPT VISIT HI MDM: CPT

## 2022-02-22 PROCEDURE — 99231 SBSQ HOSP IP/OBS SF/LOW 25: CPT

## 2022-02-22 PROCEDURE — 87086 URINE CULTURE/COLONY COUNT: CPT

## 2022-02-22 PROCEDURE — 87077 CULTURE AEROBIC IDENTIFY: CPT

## 2022-02-22 PROCEDURE — 96361 HYDRATE IV INFUSION ADD-ON: CPT

## 2022-02-22 PROCEDURE — L8699: CPT

## 2022-02-22 PROCEDURE — 83735 ASSAY OF MAGNESIUM: CPT

## 2022-02-22 PROCEDURE — 86803 HEPATITIS C AB TEST: CPT

## 2022-02-22 PROCEDURE — 96367 TX/PROPH/DG ADDL SEQ IV INF: CPT

## 2022-02-22 PROCEDURE — 82728 ASSAY OF FERRITIN: CPT

## 2022-02-22 PROCEDURE — 80202 ASSAY OF VANCOMYCIN: CPT

## 2022-02-22 PROCEDURE — 72141 MRI NECK SPINE W/O DYE: CPT | Mod: MG

## 2022-02-22 PROCEDURE — 85018 HEMOGLOBIN: CPT

## 2022-02-22 PROCEDURE — 82803 BLOOD GASES ANY COMBINATION: CPT

## 2022-02-22 PROCEDURE — 87150 DNA/RNA AMPLIFIED PROBE: CPT

## 2022-02-22 PROCEDURE — 83880 ASSAY OF NATRIURETIC PEPTIDE: CPT

## 2022-02-22 PROCEDURE — 82550 ASSAY OF CK (CPK): CPT

## 2022-02-22 PROCEDURE — 84145 PROCALCITONIN (PCT): CPT

## 2022-02-22 PROCEDURE — 74177 CT ABD & PELVIS W/CONTRAST: CPT | Mod: MA

## 2022-02-22 PROCEDURE — 70547 MR ANGIOGRAPHY NECK W/O DYE: CPT | Mod: MG

## 2022-02-22 PROCEDURE — 82435 ASSAY OF BLOOD CHLORIDE: CPT

## 2022-02-22 PROCEDURE — 85027 COMPLETE CBC AUTOMATED: CPT

## 2022-02-22 PROCEDURE — 80053 COMPREHEN METABOLIC PANEL: CPT

## 2022-02-22 PROCEDURE — U0005: CPT

## 2022-02-22 PROCEDURE — 85014 HEMATOCRIT: CPT

## 2022-02-22 PROCEDURE — 70450 CT HEAD/BRAIN W/O DYE: CPT

## 2022-02-22 PROCEDURE — 36415 COLL VENOUS BLD VENIPUNCTURE: CPT

## 2022-02-22 PROCEDURE — C8929: CPT

## 2022-02-22 PROCEDURE — 84484 ASSAY OF TROPONIN QUANT: CPT

## 2022-02-22 PROCEDURE — 72125 CT NECK SPINE W/O DYE: CPT | Mod: MA

## 2022-02-22 PROCEDURE — 83540 ASSAY OF IRON: CPT

## 2022-02-22 PROCEDURE — 82962 GLUCOSE BLOOD TEST: CPT

## 2022-02-22 PROCEDURE — 0225U NFCT DS DNA&RNA 21 SARSCOV2: CPT

## 2022-02-22 PROCEDURE — 73200 CT UPPER EXTREMITY W/O DYE: CPT | Mod: MG

## 2022-02-22 PROCEDURE — 96375 TX/PRO/DX INJ NEW DRUG ADDON: CPT

## 2022-02-22 PROCEDURE — 70544 MR ANGIOGRAPHY HEAD W/O DYE: CPT | Mod: MG

## 2022-02-22 PROCEDURE — 71045 X-RAY EXAM CHEST 1 VIEW: CPT

## 2022-02-22 PROCEDURE — 73090 X-RAY EXAM OF FOREARM: CPT

## 2022-02-22 PROCEDURE — 96365 THER/PROPH/DIAG IV INF INIT: CPT

## 2022-02-22 PROCEDURE — 93005 ELECTROCARDIOGRAM TRACING: CPT

## 2022-02-22 PROCEDURE — 74230 X-RAY XM SWLNG FUNCJ C+: CPT

## 2022-02-22 PROCEDURE — 70551 MRI BRAIN STEM W/O DYE: CPT | Mod: MG

## 2022-02-22 PROCEDURE — 81001 URINALYSIS AUTO W/SCOPE: CPT

## 2022-02-22 PROCEDURE — 85025 COMPLETE CBC W/AUTO DIFF WBC: CPT

## 2022-02-22 PROCEDURE — 80048 BASIC METABOLIC PNL TOTAL CA: CPT

## 2022-02-22 PROCEDURE — 72170 X-RAY EXAM OF PELVIS: CPT

## 2022-02-22 PROCEDURE — 84295 ASSAY OF SERUM SODIUM: CPT

## 2022-02-22 PROCEDURE — 99233 SBSQ HOSP IP/OBS HIGH 50: CPT

## 2022-02-22 PROCEDURE — 87040 BLOOD CULTURE FOR BACTERIA: CPT

## 2022-02-22 PROCEDURE — 82330 ASSAY OF CALCIUM: CPT

## 2022-02-22 PROCEDURE — 85730 THROMBOPLASTIN TIME PARTIAL: CPT

## 2022-02-22 PROCEDURE — 84100 ASSAY OF PHOSPHORUS: CPT

## 2022-02-22 PROCEDURE — 82947 ASSAY GLUCOSE BLOOD QUANT: CPT

## 2022-02-22 RX ORDER — OXYCODONE HYDROCHLORIDE 5 MG/1
1 TABLET ORAL
Qty: 0 | Refills: 0 | DISCHARGE
Start: 2022-02-22

## 2022-02-22 RX ORDER — PANTOPRAZOLE SODIUM 20 MG/1
1 TABLET, DELAYED RELEASE ORAL
Qty: 30 | Refills: 0
Start: 2022-02-22 | End: 2022-03-23

## 2022-02-22 RX ORDER — DILTIAZEM HCL 120 MG
1 CAPSULE, EXT RELEASE 24 HR ORAL
Qty: 0 | Refills: 0 | DISCHARGE
Start: 2022-02-22

## 2022-02-22 RX ORDER — FERROUS SULFATE 325(65) MG
5 TABLET ORAL
Qty: 0 | Refills: 0 | DISCHARGE
Start: 2022-02-22

## 2022-02-22 RX ORDER — APIXABAN 2.5 MG/1
1 TABLET, FILM COATED ORAL
Qty: 0 | Refills: 0 | DISCHARGE
Start: 2022-02-22

## 2022-02-22 RX ADMIN — Medication 10 MILLIGRAM(S): at 12:18

## 2022-02-22 RX ADMIN — SODIUM CHLORIDE 50 MILLILITER(S): 9 INJECTION INTRAMUSCULAR; INTRAVENOUS; SUBCUTANEOUS at 06:27

## 2022-02-22 RX ADMIN — Medication 60 MILLIGRAM(S): at 06:29

## 2022-02-22 RX ADMIN — Medication 12.5 MILLIGRAM(S): at 06:28

## 2022-02-22 RX ADMIN — Medication 60 MILLIGRAM(S): at 12:18

## 2022-02-22 RX ADMIN — OXYCODONE HYDROCHLORIDE 5 MILLIGRAM(S): 5 TABLET ORAL at 06:31

## 2022-02-22 RX ADMIN — APIXABAN 5 MILLIGRAM(S): 2.5 TABLET, FILM COATED ORAL at 06:28

## 2022-02-22 RX ADMIN — Medication 1 APPLICATION(S): at 06:29

## 2022-02-22 RX ADMIN — SODIUM CHLORIDE 50 MILLILITER(S): 9 INJECTION INTRAMUSCULAR; INTRAVENOUS; SUBCUTANEOUS at 12:17

## 2022-02-22 RX ADMIN — Medication 60 MILLIGRAM(S): at 00:08

## 2022-02-22 RX ADMIN — PANTOPRAZOLE SODIUM 40 MILLIGRAM(S): 20 TABLET, DELAYED RELEASE ORAL at 06:31

## 2022-02-22 RX ADMIN — Medication 300 MILLIGRAM(S): at 12:17

## 2022-02-22 NOTE — PROGRESS NOTE ADULT - PROVIDER SPECIALTY LIST ADULT
Cardiology
Hospitalist
Infectious Disease
Infectious Disease
MICU
Neurology
Palliative Care
Palliative Care
Cardiology
Cardiology
Gastroenterology
Hospitalist
Hospitalist
Infectious Disease
MICU
Orthopedics
Palliative Care
Cardiology
Gastroenterology
Hospitalist
Infectious Disease
Neurology
Orthopedics
Hospitalist
Neurology
Palliative Care
Palliative Care
Gastroenterology
MICU
Palliative Care
Gastroenterology
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
## 2022-02-22 NOTE — PROGRESS NOTE ADULT - TIME BILLING
chart review, lab review, imaging review, notes review. Discussion with  and coordination of care with all relevant providers and consultants caring for patient.
coordinating care with consultants, MICU PA/resident, MICU RN
chart review, lab review, imaging review, notes review. Discussion with  and coordination of care with all relevant providers and consultants caring for patient.
chart review, lab review, imaging review, notes review. Discussion with  and coordination of care with all relevant providers and consultants caring for patient. discussion with Dr. Cabrera.
extensive chart review, patient encounter.  Plan discussed with 4T[facility] SALINA Javed.
chart review, lab review, imaging review, notes review. Discussion with  and coordination of care with all relevant providers and consultants caring for patient.
I reviewed the MRI images, labs, notes
coordinating care with MICU PA/resident, MICU RN, counseling family
chart review, lab review, imaging review, notes review. Discussion with  and coordination of care with all relevant providers and consultants caring for patient.
chart review, lab review, imaging review, notes review. Discussion with  and coordination of care with all relevant providers and consultants caring for patient.
I reviewed labs, radiology images, notes
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
## 2022-02-22 NOTE — PROGRESS NOTE ADULT - REASON FOR ADMISSION
CVA / Atrial Appendage Clots / Cervical Subluxation / AF with RVR

## 2022-02-22 NOTE — PROGRESS NOTE ADULT - SUBJECTIVE AND OBJECTIVE BOX
OVERNIGHT EVENTS: s/p PEG 2/18. awaiting placement. no active issues.     Present Symptoms:     Dyspnea: none   Nausea/Vomiting: No  Anxiety:  No  Depression: unable   Fatigue: unable   Loss of appetite: unable   Constipation: none     Pain: none             Character-            Duration-            Effect-            Factors-            Frequency-            Location-            Severity-    Pain AD Score:  http://geriatrictoolkit.[email or web address]/[email or web address] (press ctrl + left click to view)    Review of Systems: Reviewed                     Unable to obtain due to poor mentation   All others negative    MEDICATIONS  (STANDING):  apixaban 5 milliGRAM(s) Oral every 12 hours  atorvastatin 40 milliGRAM(s) Oral at bedtime  dextrose 50% Injectable 12.5 Gram(s) IV Push once  diltiazem    Tablet 60 milliGRAM(s) Oral every 6 hours  ferrous    sulfate Liquid 300 milliGRAM(s) Enteral Tube daily  metoprolol tartrate 12.5 milliGRAM(s) Oral every 12 hours  oxybutynin 10 milliGRAM(s) Oral daily  oxyCODONE    IR 5 milliGRAM(s) Oral two times a day  pantoprazole  Injectable 40 milliGRAM(s) IV Push every 12 hours  petrolatum Ophthalmic Ointment 1 Application(s) Both EYES two times a day  potassium chloride   Powder 40 milliEquivalent(s) Enteral Tube once  sodium chloride 0.9%. 1000 milliLiter(s) (50 mL/Hr) IV Continuous <Continuous>    MEDICATIONS  (PRN):  acetaminophen     Tablet .. 650 milliGRAM(s) Oral every 6 hours PRN Temp greater or equal to 38C (100.4F), Mild Pain (1 - 3)  diltiazem Injectable 10 milliGRAM(s) IV Push every 6 hours PRN If HR remains > 120  ondansetron Injectable 4 milliGRAM(s) IV Push every 6 hours PRN Nausea and/or Vomiting    PHYSICAL EXAM:    Vital Signs Last 24 Hrs  T(C): 36.4 (22 Feb 2022 10:07), Max: 36.6 (21 Feb 2022 20:20)  T(F): 97.5 (22 Feb 2022 10:07), Max: 97.9 (21 Feb 2022 20:20)  HR: 89 (22 Feb 2022 10:07) (60 - 89)  BP: 124/78 (22 Feb 2022 10:07) (95/59 - 147/83)  BP(mean): --  RR: 18 (22 Feb 2022 10:07) (18 - 18)  SpO2: 96% (22 Feb 2022 10:07) (94% - 96%)    General: none     Karnofsky:  30 %    HEENT: normal      Lungs: comfortable     CV: normal      GI: PEG     : normal     MSK: weakness      Skin: no rash    LABS:                      9.4    12.11 )-----------( 489      ( 22 Feb 2022 06:13 )             30.0     02-22    143  |  105  |  5.4<L>  ----------------------------<  83  3.6   |  29.0  |  0.44<L>    Ca    8.7      22 Feb 2022 06:13  Phos  3.6     02-22  Mg     1.9     02-22    PTT - ( 21 Feb 2022 07:04 )  PTT:54.6 sec    I&O's Summary    21 Feb 2022 07:01  -  22 Feb 2022 07:00  --------------------------------------------------------  IN: 0 mL / OUT: 2920 mL / NET: -2920 mL    RADIOLOGY & ADDITIONAL STUDIES:    ADVANCE DIRECTIVES/TREATMENT PREFERENCES:  Full code
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
## 2022-02-24 RX ORDER — LISINOPRIL 2.5 MG/1
1 TABLET ORAL
Qty: 0 | Refills: 0 | DISCHARGE

## 2022-02-24 RX ORDER — METOPROLOL TARTRATE 50 MG
1 TABLET ORAL
Qty: 0 | Refills: 0 | DISCHARGE

## 2022-02-24 RX ORDER — OXYBUTYNIN CHLORIDE 5 MG
1 TABLET ORAL
Qty: 0 | Refills: 0 | DISCHARGE

## 2022-02-24 RX ORDER — ATORVASTATIN CALCIUM 80 MG/1
1 TABLET, FILM COATED ORAL
Qty: 0 | Refills: 0 | DISCHARGE

## 2022-02-24 RX ORDER — ZOLPIDEM TARTRATE 10 MG/1
1 TABLET ORAL
Qty: 0 | Refills: 0 | DISCHARGE

## 2022-02-24 NOTE — CDI QUERY NOTE - NSCDIOTHERTXTBX_GEN_ALL_CORE_HH
Adult Assessment and Intervention [Charted Location: [address] [address]] [Authored: 22-Feb-2022 12:06] Physical Therapist  Bed Mobility  Bed Mobility Training Sit-to-Supine: dependent (less than 25% patient effort);  1 person assist;  nonverbal cues (demo/gestures);  verbal cues  Bed Mobility Training Supine-to-Sit: dependent (less than 25% patient effort);  1 person assist;  nonverbal cues (demo/gestures);  verbal cues  Bed Mobility Training Limitations: decreased ability to use arms for pushing/pulling;  decreased ability to use legs for bridging/pushing;  impaired ability to control trunk for mobility;  abnormal muscle tone;  decreased strength;  impaired balance;  impaired postural control;  decreased ROM      Adult Assessment and Intervention [Charted Location: [address] [address]] [Authored: 22-Feb-2022 02:08]  [facility] Functional Assessment: Basic Mobility  Type of Assessment: Daily assessment  Turning from your back to your side while in a flat bed without using bedrails?: 1 = Total assistance  Moving from lying on your back to sitting on the flat side of a flat bed without using bedrails?: 1 = Total assistance  Moving to and from a bed to a chair (including a wheelchair)?: 1 = Total assistance  Standing up from a chair using your arms (e.g. wheelchair or bedside chair)?: 1 = Total assistance  To walk in hospital room?: 1 = Total assistance  Climbing 3-5 steps with a railing?: 1 = Total assistance  Score: 6   Row Comment: Ask the patient "How much help from another person do you currently need? (If the patient hasn't done an activity recently, how much help from another person do you think he/she needs if he/she tried?)    AM-PAC Functional Assessment: Daily Activity  Type of Assessment: Daily assessment  Putting on and taking off regular lower body clothing?: 1 = Total assistance  Bathing (including washing, rinsing, drying)?: 1 = Total assistance  Toileting, which includes using toilet, bedpan or urinal?: 1 = Total assistance  Putting on and taking off regular upper body clothing?: 1 = Total assistance  Take care of personal grooming such as brushing teeth?: 1 = Total assistance  Eating meals?: 1 = Total assistance  Score: 6         Adult Assessment and Intervention [Charted Location: [address] [address]] [Authored: 21-Feb-2022 02:00]  [facility] Functional Assessment: Basic Mobility  Type of Assessment: Daily assessment  Turning from your back to your side while in a flat bed without using bedrails?: 1 = Total assistance  Moving from lying on your back to sitting on the flat side of a flat bed without using bedrails?: 1 = Total assistance  Moving to and from a bed to a chair (including a wheelchair)?: 1 = Total assistance  Standing up from a chair using your arms (e.g. wheelchair or bedside chair)?: 1 = Total assistance  To walk in hospital room?: 1 = Total assistance  Climbing 3-5 steps with a railing?: 1 = Total assistance  Score: 6   Row Comment: Ask the patient "How much help from another person do you currently need? (If the patient hasn't done an activity recently, how much help from another person do you think he/she needs if he/she tried?)    AM-PAC Functional Assessment: Daily Activity  Type of Assessment: Daily assessment  Putting on and taking off regular lower body clothing?: 1 = Total assistance  Bathing (including washing, rinsing, drying)?: 1 = Total assistance  Toileting, which includes using toilet, bedpan or urinal?: 1 = Total assistance  Putting on and taking off regular upper body clothing?: 1 = Total assistance  Take care of personal grooming such as brushing teeth?: 1 = Total assistance  Eating meals?: 1 = Total assistance  Score: 6       Can you please clarify if patient functional status supports a clinically significant diagnosis?  A.	Functional quadriplegia  B.	Other, please specify  C.	Not clinically significant

## 2022-02-25 ENCOUNTER — APPOINTMENT (OUTPATIENT)
Dept: NEUROLOGY | Facility: CLINIC | Age: 67
End: 2022-02-25

## 2022-03-17 NOTE — SWALLOW VFSS/MBS ASSESSMENT ADULT - ADDITIONAL RECOMMENDATIONS
Spoke to pt by phone. Informed him his INR was 1.7 cont current Coumadin at current dose and repeat 2weeks.   
SLP to follow for dysphagia tx, as schedule allows   Trials of puree w/moderately thick liquids via tsp, in tx w/SLP only

## 2022-10-27 NOTE — ED PROVIDER NOTE - NS ED MD DISPO DISCHARGE CCDA
Instructions: This plan will send the code FBSD to the PM system.  DO NOT or CHANGE the price.
Price (Do Not Change): 0.00
Detail Level: Simple
Patient/Caregiver provided printed discharge information.

## 2024-05-12 NOTE — SWALLOW BEDSIDE ASSESSMENT ADULT - SWALLOW EVAL: RECOMMENDED DIET
Hypertension Sister     Breast Cancer Maternal Aunt     Colon Cancer Paternal Uncle     Diabetes Maternal Grandmother         gestational diabetes     Breast Cancer Paternal Grandmother     Other Daughter 39        Chron's       Review of Systems  A comprehensive review of systems was negative except for what was noted in the HPI.     Recent Labs:  CBC:   Lab Results   Component Value Date/Time    WBC 6.2 10/26/2023 10:06 AM    HGB 13.2 10/26/2023 10:06 AM    HCT 39.4 10/26/2023 10:06 AM    MCH 28.5 10/26/2023 10:06 AM    MCHC 33.4 10/26/2023 10:06 AM    RDW 13.5 10/26/2023 10:06 AM     10/26/2023 10:06 AM    MPV 8.2 10/26/2023 10:06 AM     CMP:   Lab Results   Component Value Date/Time     05/08/2023 09:34 AM    K 4.6 05/08/2023 09:34 AM    K 3.9 08/03/2021 09:01 PM     05/08/2023 09:34 AM    CO2 30 05/08/2023 09:34 AM    ANIONGAP 9 05/08/2023 09:34 AM    GLUCOSE 100 05/08/2023 09:34 AM    BUN 19 05/08/2023 09:34 AM    CREATININE 0.9 05/08/2023 09:34 AM    GFRAA >60 08/17/2022 10:54 AM    GFRAA >60 06/23/2011 09:45 AM    CALCIUM 9.1 05/08/2023 09:34 AM    PROT 7.4 06/23/2011 09:45 AM    AGRATIO 1.9 05/08/2023 09:34 AM    BILITOT 0.3 05/08/2023 09:34 AM    ALKPHOS 96 05/08/2023 09:34 AM    ALT 35 05/08/2023 09:34 AM    AST 25 05/08/2023 09:34 AM    GLOB 3.3 08/03/2021 09:01 PM       HBA1C:No results found for: \"LABA1C\", \"EAG\"    Objective:   Physical Exam   BP (!) 142/68   Pulse 73   Temp 97.8 °F (36.6 °C) (Infrared)   Ht 1.6 m (5' 3\")   Wt 60.9 kg (134 lb 3.2 oz)   SpO2 96%   BMI 23.77 kg/m²   Weight - Scale: 60.9 kg (134 lb 3.2 oz)   Constitutional: She is oriented to person, place, and time. Appears well-developed and well-nourished. No distress.   HENT:   Head: Normocephalic and atraumatic.   Mouth/Throat: Uvula is midline, oropharynx is clear and moist and mucous membranes are normal.   Eyes: Conjunctivae and EOM are normal. Pupils are equal, round, and reactive to light.   Neck: 
Regular with thin liquids
NPO